# Patient Record
Sex: MALE | Race: BLACK OR AFRICAN AMERICAN | NOT HISPANIC OR LATINO | URBAN - METROPOLITAN AREA
[De-identification: names, ages, dates, MRNs, and addresses within clinical notes are randomized per-mention and may not be internally consistent; named-entity substitution may affect disease eponyms.]

---

## 2017-02-04 ENCOUNTER — EMERGENCY (EMERGENCY)
Facility: HOSPITAL | Age: 62
LOS: 1 days | Discharge: HOME | End: 2017-02-04
Attending: EMERGENCY MEDICINE | Admitting: EMERGENCY MEDICINE
Payer: COMMERCIAL

## 2017-02-04 VITALS
SYSTOLIC BLOOD PRESSURE: 156 MMHG | RESPIRATION RATE: 18 BRPM | HEART RATE: 83 BPM | OXYGEN SATURATION: 99 % | TEMPERATURE: 98 F | DIASTOLIC BLOOD PRESSURE: 99 MMHG

## 2017-02-04 DIAGNOSIS — Z79.899 OTHER LONG TERM (CURRENT) DRUG THERAPY: ICD-10-CM

## 2017-02-04 DIAGNOSIS — M25.512 PAIN IN LEFT SHOULDER: ICD-10-CM

## 2017-02-04 DIAGNOSIS — G89.29 OTHER CHRONIC PAIN: ICD-10-CM

## 2017-02-04 DIAGNOSIS — Z98.89 OTHER SPECIFIED POSTPROCEDURAL STATES: Chronic | ICD-10-CM

## 2017-02-04 DIAGNOSIS — I10 ESSENTIAL (PRIMARY) HYPERTENSION: ICD-10-CM

## 2017-02-04 LAB
ALBUMIN SERPL ELPH-MCNC: 4.4 G/DL — SIGNIFICANT CHANGE UP (ref 3.3–5)
ALP SERPL-CCNC: 54 U/L — SIGNIFICANT CHANGE UP (ref 40–120)
ALT FLD-CCNC: 24 U/L RC — SIGNIFICANT CHANGE UP (ref 10–45)
ANION GAP SERPL CALC-SCNC: 12 MMOL/L — SIGNIFICANT CHANGE UP (ref 5–17)
APPEARANCE UR: CLEAR — SIGNIFICANT CHANGE UP
AST SERPL-CCNC: 21 U/L — SIGNIFICANT CHANGE UP (ref 10–40)
BASOPHILS # BLD AUTO: 0.1 K/UL — SIGNIFICANT CHANGE UP (ref 0–0.2)
BASOPHILS NFR BLD AUTO: 1.1 % — SIGNIFICANT CHANGE UP (ref 0–2)
BILIRUB SERPL-MCNC: 0.6 MG/DL — SIGNIFICANT CHANGE UP (ref 0.2–1.2)
BILIRUB UR-MCNC: NEGATIVE — SIGNIFICANT CHANGE UP
BUN SERPL-MCNC: 21 MG/DL — SIGNIFICANT CHANGE UP (ref 7–23)
CALCIUM SERPL-MCNC: 9.1 MG/DL — SIGNIFICANT CHANGE UP (ref 8.4–10.5)
CHLORIDE SERPL-SCNC: 100 MMOL/L — SIGNIFICANT CHANGE UP (ref 96–108)
CO2 SERPL-SCNC: 27 MMOL/L — SIGNIFICANT CHANGE UP (ref 22–31)
COLOR SPEC: SIGNIFICANT CHANGE UP
CREAT SERPL-MCNC: 1.32 MG/DL — HIGH (ref 0.5–1.3)
DIFF PNL FLD: ABNORMAL
EOSINOPHIL # BLD AUTO: 0.2 K/UL — SIGNIFICANT CHANGE UP (ref 0–0.5)
EOSINOPHIL NFR BLD AUTO: 4 % — SIGNIFICANT CHANGE UP (ref 0–6)
EPI CELLS # UR: SIGNIFICANT CHANGE UP /HPF
GLUCOSE SERPL-MCNC: 94 MG/DL — SIGNIFICANT CHANGE UP (ref 70–99)
GLUCOSE UR QL: NEGATIVE — SIGNIFICANT CHANGE UP
HCT VFR BLD CALC: 45.1 % — SIGNIFICANT CHANGE UP (ref 39–50)
HGB BLD-MCNC: 15.1 G/DL — SIGNIFICANT CHANGE UP (ref 13–17)
KETONES UR-MCNC: NEGATIVE — SIGNIFICANT CHANGE UP
LEUKOCYTE ESTERASE UR-ACNC: NEGATIVE — SIGNIFICANT CHANGE UP
LYMPHOCYTES # BLD AUTO: 1.8 K/UL — SIGNIFICANT CHANGE UP (ref 1–3.3)
LYMPHOCYTES # BLD AUTO: 29.6 % — SIGNIFICANT CHANGE UP (ref 13–44)
MCHC RBC-ENTMCNC: 30.2 PG — SIGNIFICANT CHANGE UP (ref 27–34)
MCHC RBC-ENTMCNC: 33.5 GM/DL — SIGNIFICANT CHANGE UP (ref 32–36)
MCV RBC AUTO: 90.1 FL — SIGNIFICANT CHANGE UP (ref 80–100)
MONOCYTES # BLD AUTO: 0.5 K/UL — SIGNIFICANT CHANGE UP (ref 0–0.9)
MONOCYTES NFR BLD AUTO: 8 % — SIGNIFICANT CHANGE UP (ref 2–14)
NEUTROPHILS # BLD AUTO: 3.5 K/UL — SIGNIFICANT CHANGE UP (ref 1.8–7.4)
NEUTROPHILS NFR BLD AUTO: 57.3 % — SIGNIFICANT CHANGE UP (ref 43–77)
NITRITE UR-MCNC: NEGATIVE — SIGNIFICANT CHANGE UP
PH UR: 6.5 — SIGNIFICANT CHANGE UP (ref 4.8–8)
PLATELET # BLD AUTO: 170 K/UL — SIGNIFICANT CHANGE UP (ref 150–400)
POTASSIUM SERPL-MCNC: 4.2 MMOL/L — SIGNIFICANT CHANGE UP (ref 3.5–5.3)
POTASSIUM SERPL-SCNC: 4.2 MMOL/L — SIGNIFICANT CHANGE UP (ref 3.5–5.3)
PROT SERPL-MCNC: 7.6 G/DL — SIGNIFICANT CHANGE UP (ref 6–8.3)
PROT UR-MCNC: NEGATIVE — SIGNIFICANT CHANGE UP
RBC # BLD: 5 M/UL — SIGNIFICANT CHANGE UP (ref 4.2–5.8)
RBC # FLD: 12.3 % — SIGNIFICANT CHANGE UP (ref 10.3–14.5)
RBC CASTS # UR COMP ASSIST: SIGNIFICANT CHANGE UP /HPF (ref 0–2)
SODIUM SERPL-SCNC: 139 MMOL/L — SIGNIFICANT CHANGE UP (ref 135–145)
SP GR SPEC: 1.02 — SIGNIFICANT CHANGE UP (ref 1.01–1.02)
UROBILINOGEN FLD QL: NEGATIVE — SIGNIFICANT CHANGE UP
WBC # BLD: 6.2 K/UL — SIGNIFICANT CHANGE UP (ref 3.8–10.5)
WBC # FLD AUTO: 6.2 K/UL — SIGNIFICANT CHANGE UP (ref 3.8–10.5)
WBC UR QL: SIGNIFICANT CHANGE UP /HPF (ref 0–5)

## 2017-02-04 PROCEDURE — 96374 THER/PROPH/DIAG INJ IV PUSH: CPT

## 2017-02-04 PROCEDURE — 99284 EMERGENCY DEPT VISIT MOD MDM: CPT | Mod: 25

## 2017-02-04 PROCEDURE — 73030 X-RAY EXAM OF SHOULDER: CPT | Mod: 26,LT

## 2017-02-04 PROCEDURE — 71046 X-RAY EXAM CHEST 2 VIEWS: CPT

## 2017-02-04 PROCEDURE — 71020: CPT | Mod: 26

## 2017-02-04 PROCEDURE — 73030 X-RAY EXAM OF SHOULDER: CPT

## 2017-02-04 PROCEDURE — 80053 COMPREHEN METABOLIC PANEL: CPT

## 2017-02-04 PROCEDURE — 99284 EMERGENCY DEPT VISIT MOD MDM: CPT

## 2017-02-04 PROCEDURE — 85027 COMPLETE CBC AUTOMATED: CPT

## 2017-02-04 PROCEDURE — 81001 URINALYSIS AUTO W/SCOPE: CPT

## 2017-02-04 RX ORDER — MORPHINE SULFATE 50 MG/1
4 CAPSULE, EXTENDED RELEASE ORAL ONCE
Qty: 0 | Refills: 0 | Status: DISCONTINUED | OUTPATIENT
Start: 2017-02-04 | End: 2017-02-04

## 2017-02-04 RX ORDER — IBUPROFEN 200 MG
600 TABLET ORAL ONCE
Qty: 0 | Refills: 0 | Status: DISCONTINUED | OUTPATIENT
Start: 2017-02-04 | End: 2017-02-04

## 2017-02-04 RX ORDER — OXYCODONE HYDROCHLORIDE 5 MG/1
5 TABLET ORAL ONCE
Qty: 0 | Refills: 0 | Status: DISCONTINUED | OUTPATIENT
Start: 2017-02-04 | End: 2017-02-04

## 2017-02-04 RX ORDER — OXYCODONE HYDROCHLORIDE 5 MG/1
1 TABLET ORAL
Qty: 8 | Refills: 0
Start: 2017-02-04 | End: 2017-02-06

## 2017-02-04 RX ADMIN — MORPHINE SULFATE 4 MILLIGRAM(S): 50 CAPSULE, EXTENDED RELEASE ORAL at 12:58

## 2017-02-04 NOTE — ED PROVIDER NOTE - PROGRESS NOTE DETAILS
pain improved with morphine, labs wnl except for slight elevation of Cr, Xray normal lungs/no bony lesions. will check urine for protein. Have patient Follow up with primary doctor/nephrologist for further w/u and monitoring of renal function -Talya DO pain improved, UA no protein, will d/c with outpatient Follow up -Talya LARIOS

## 2017-02-04 NOTE — ED PROVIDER NOTE - CARE PLAN
Principal Discharge DX:	Chronic left shoulder pain  Instructions for follow-up, activity and diet:	1. Return to ED for worsening, progressive or any other concerning symptoms   2. Follow up with your primary care doctor in 2-3days, also follow up with your nephrologist in 1-2 weeks  3. Take Tylenol up to 650 mg every 6 hours as needed for pain.   4. Take oxycodone 5 mg every 6 hours as needed for severe pain; do not drink alcohol while taking this medication.

## 2017-02-04 NOTE — ED PROVIDER NOTE - PHYSICAL EXAMINATION
Gen: moderate discomfort, AOx3  Head: NCAT  HEENT: PERRL, oral mucosa moist, normal conjunctiva, neck supple  Lung: CTAB, no respiratory distress  CV: rrr, no murmur, Normal perfusion, +2 b/l radial and brachial pulses, cap refill <2, extremities warm  Abd: soft, ND  MSK: No edema, no visible deformities, no reproducible pain, points to pain suprascapular and subscapular, unable to reproduce pain with shoulder/neck movements, no midline vertebral ttp, no scapular/clavicular ttp  Neuro: No focal neurologic deficits, sensation intact, 5/5 global strength  Skin: No rash   Psych: normal affect

## 2017-02-04 NOTE — ED PROVIDER NOTE - ATTENDING CONTRIBUTION TO CARE
Private Physician Anastacia ([facility])  61y male pmh HTN, No dm, hld, cancer, sp benign cyst on kidney sp nephrectomy 2y ago. no habits, No cad/mi/travel. Employed as , Pt comes to ed complains of left shoulder pain past 3wk, Constant, "it just hurt with a sticking pain"  No precipitating alleviating facotors took oxycodone X1 without relief. No cp/sob/fc/abd/weight loss/cough/hemptysis/ha/dizzy/weakness/numbness. PE WDWN male awake alert with mild distress from left shoulder pain. NCAT Neck supple neg by nexus, Left shoulder full rom no ttp/swelling/erythema/edmea. Distal neuro vasc normal Chest clear anterior & posterior. cv no rubs, gallops or murmurs abd soft neuro no focal defects  Theron Chang MD, Facep

## 2017-02-04 NOTE — ED PROVIDER NOTE - OBJECTIVE STATEMENT
60yo M with left shoulder pain x3 weeks atraumatic, worsening, taking intermittent NSAID and oxy w/o any relief, constant 10/10 radiates from left upper scapula into shoulder intermittently, not worse with movement of shoulder/neck pain. no fever/chills. no weakness. does not radiate down arm. no pain elsewhere. no h/o CA. h/o nephrectomy for cyst, no urinary sx, no LE weakness. no paresthesias. no neuro complaints. no CP/SOB. not worse with exertion.

## 2017-02-04 NOTE — ED ADULT NURSE NOTE - OBJECTIVE STATEMENT
Pt presents to the ER A+Ox3 complaining of left shoulder pain x3 weeks. Pt describes the pain as severe and a stabbing pain; cannot describe what relieves the pain; hurts constantly with or without movement. Pt is a  for a living. Pt is able to move all four extremities; no limitations with ROM in effected shoulder. (-) redness, swelling, deformity. Sensory intact.

## 2017-02-04 NOTE — ED PROVIDER NOTE - MEDICAL DECISION MAKING DETAILS
atraumatic pain, unable to reproduce, no suspicion for cardiac/pulm etiology, concern for potential malignancy/metastatic process/MM/lymphoma. will check CBC/CMP/UA. Xray shoulder/chest. if normal plan to d/c with close Follow up for further w/u and imaging as needed

## 2017-02-04 NOTE — ED PROVIDER NOTE - PLAN OF CARE
1. Return to ED for worsening, progressive or any other concerning symptoms   2. Follow up with your primary care doctor in 2-3days, also follow up with your nephrologist in 1-2 weeks  3. Take Tylenol up to 650 mg every 6 hours as needed for pain.   4. Take oxycodone 5 mg every 6 hours as needed for severe pain; do not drink alcohol while taking this medication.

## 2017-06-20 ENCOUNTER — EMERGENCY (EMERGENCY)
Facility: HOSPITAL | Age: 62
LOS: 1 days | Discharge: HOME | End: 2017-06-20
Attending: EMERGENCY MEDICINE | Admitting: EMERGENCY MEDICINE
Payer: COMMERCIAL

## 2017-06-20 VITALS
RESPIRATION RATE: 18 BRPM | OXYGEN SATURATION: 98 % | SYSTOLIC BLOOD PRESSURE: 139 MMHG | HEART RATE: 86 BPM | DIASTOLIC BLOOD PRESSURE: 77 MMHG | TEMPERATURE: 100 F

## 2017-06-20 VITALS
RESPIRATION RATE: 16 BRPM | TEMPERATURE: 99 F | SYSTOLIC BLOOD PRESSURE: 152 MMHG | OXYGEN SATURATION: 99 % | HEART RATE: 79 BPM | DIASTOLIC BLOOD PRESSURE: 84 MMHG

## 2017-06-20 DIAGNOSIS — Z98.89 OTHER SPECIFIED POSTPROCEDURAL STATES: Chronic | ICD-10-CM

## 2017-06-20 PROCEDURE — 99284 EMERGENCY DEPT VISIT MOD MDM: CPT | Mod: 25

## 2017-06-20 PROCEDURE — 72100 X-RAY EXAM L-S SPINE 2/3 VWS: CPT | Mod: 26

## 2017-06-20 PROCEDURE — 96374 THER/PROPH/DIAG INJ IV PUSH: CPT

## 2017-06-20 PROCEDURE — 99284 EMERGENCY DEPT VISIT MOD MDM: CPT

## 2017-06-20 PROCEDURE — 72100 X-RAY EXAM L-S SPINE 2/3 VWS: CPT

## 2017-06-20 PROCEDURE — 96375 TX/PRO/DX INJ NEW DRUG ADDON: CPT

## 2017-06-20 RX ORDER — KETOROLAC TROMETHAMINE 30 MG/ML
15 SYRINGE (ML) INJECTION ONCE
Qty: 0 | Refills: 0 | Status: DISCONTINUED | OUTPATIENT
Start: 2017-06-20 | End: 2017-06-20

## 2017-06-20 RX ORDER — LIDOCAINE 4 G/100G
1 CREAM TOPICAL ONCE
Qty: 0 | Refills: 0 | Status: COMPLETED | OUTPATIENT
Start: 2017-06-20 | End: 2017-06-20

## 2017-06-20 RX ORDER — SODIUM CHLORIDE 9 MG/ML
1000 INJECTION INTRAMUSCULAR; INTRAVENOUS; SUBCUTANEOUS ONCE
Qty: 0 | Refills: 0 | Status: COMPLETED | OUTPATIENT
Start: 2017-06-20 | End: 2017-06-20

## 2017-06-20 RX ORDER — ACETAMINOPHEN 500 MG
1000 TABLET ORAL ONCE
Qty: 0 | Refills: 0 | Status: COMPLETED | OUTPATIENT
Start: 2017-06-20 | End: 2017-06-20

## 2017-06-20 RX ADMIN — Medication 15 MILLIGRAM(S): at 13:43

## 2017-06-20 RX ADMIN — SODIUM CHLORIDE 1000 MILLILITER(S): 9 INJECTION INTRAMUSCULAR; INTRAVENOUS; SUBCUTANEOUS at 13:43

## 2017-06-20 RX ADMIN — Medication 400 MILLIGRAM(S): at 13:43

## 2017-06-20 RX ADMIN — LIDOCAINE 1 PATCH: 4 CREAM TOPICAL at 13:38

## 2017-06-20 NOTE — ED PROVIDER NOTE - CARE PLAN
Principal Discharge DX:	Back pain Principal Discharge DX:	Back pain  Instructions for follow-up, activity and diet:	Take Tylenol 1000 mg every 6 hours as needed for pain. Use lidocaine patches (over the counter, i.e. Salonpas). Take ibuprofen 600 mg or Aleve once a day. Follow up with your primary doctor and neurologist. Return to the Emergency Dept if you develop any new or worsening symptoms

## 2017-06-20 NOTE — ED PROVIDER NOTE - MEDICAL DECISION MAKING DETAILS
61 year old male with 5 days of back pain, will check basic labs and get lumbar x-ray due to history of CA to r/o mets, pain control, and likely d/c with spine follow up Jacob: 61 year old male with 5 days of back pain, will check basic labs and get lumbar x-ray due to history of CA to r/o mets, pain control, and likely d/c with spine follow up Jacob: 61 year old male with 5 days of back pain, will check lumbar x-ray due to history of CA to r/o mets, pain control, and likely d/c with spine follow up

## 2017-06-20 NOTE — ED PROVIDER NOTE - PLAN OF CARE
Take Tylenol 1000 mg every 6 hours as needed for pain. Use lidocaine patches (over the counter, i.e. Salonpas). Take ibuprofen 600 mg or Aleve once a day. Follow up with your primary doctor and neurologist. Return to the Emergency Dept if you develop any new or worsening symptoms

## 2017-06-20 NOTE — ED PROVIDER NOTE - PROGRESS NOTE DETAILS
Patient feeling much better after treatment, expresses desire to go home. Discussed plan of care and results with patient. Patient comfortable with discharge plan, understands return instructions.

## 2017-06-20 NOTE — ED PROVIDER NOTE - OBJECTIVE STATEMENT
61 year old male with PMHx of HTN, R renal CA years ago s/p nephrectomy (no chemo or radiation) presenting with lower back pain x 5 days, worsening with movement. Denies fevers, chills, numbness, tingling, weakness, bowel or bladder problems, or difficulty ambulating. He had this once before many years ago. Took 2 Aleve this morning.

## 2017-07-28 ENCOUNTER — APPOINTMENT (OUTPATIENT)
Dept: GASTROENTEROLOGY | Facility: CLINIC | Age: 62
End: 2017-07-28
Payer: COMMERCIAL

## 2017-07-28 PROCEDURE — 99204 OFFICE O/P NEW MOD 45 MIN: CPT

## 2017-08-04 ENCOUNTER — APPOINTMENT (OUTPATIENT)
Dept: GASTROENTEROLOGY | Facility: CLINIC | Age: 62
End: 2017-08-04
Payer: COMMERCIAL

## 2017-08-04 ENCOUNTER — RESULT REVIEW (OUTPATIENT)
Age: 62
End: 2017-08-04

## 2017-08-04 PROCEDURE — 43239 EGD BIOPSY SINGLE/MULTIPLE: CPT | Mod: 59

## 2017-08-04 PROCEDURE — 45378 DIAGNOSTIC COLONOSCOPY: CPT

## 2017-12-20 NOTE — ED ADULT NURSE NOTE - CHIEF COMPLAINT
The patient is a 61y Male complaining of pain, shoulder. Normal vision: sees adequately in most situations; can see medication labels, newsprint

## 2018-11-12 ENCOUNTER — EMERGENCY (EMERGENCY)
Facility: HOSPITAL | Age: 63
LOS: 1 days | Discharge: HOME | End: 2018-11-12
Attending: EMERGENCY MEDICINE
Payer: COMMERCIAL

## 2018-11-12 VITALS
DIASTOLIC BLOOD PRESSURE: 69 MMHG | HEART RATE: 77 BPM | OXYGEN SATURATION: 99 % | SYSTOLIC BLOOD PRESSURE: 123 MMHG | HEIGHT: 69 IN | RESPIRATION RATE: 18 BRPM | WEIGHT: 166.89 LBS | TEMPERATURE: 98 F

## 2018-11-12 VITALS
DIASTOLIC BLOOD PRESSURE: 81 MMHG | RESPIRATION RATE: 18 BRPM | HEART RATE: 71 BPM | TEMPERATURE: 98 F | SYSTOLIC BLOOD PRESSURE: 144 MMHG | OXYGEN SATURATION: 99 %

## 2018-11-12 DIAGNOSIS — Z90.5 ACQUIRED ABSENCE OF KIDNEY: Chronic | ICD-10-CM

## 2018-11-12 DIAGNOSIS — Z98.89 OTHER SPECIFIED POSTPROCEDURAL STATES: Chronic | ICD-10-CM

## 2018-11-12 LAB
ALBUMIN SERPL ELPH-MCNC: 4.8 G/DL — SIGNIFICANT CHANGE UP (ref 3.3–5)
ALP SERPL-CCNC: 54 U/L — SIGNIFICANT CHANGE UP (ref 40–120)
ALT FLD-CCNC: 21 U/L — SIGNIFICANT CHANGE UP (ref 10–45)
ANION GAP SERPL CALC-SCNC: 12 MMOL/L — SIGNIFICANT CHANGE UP (ref 5–17)
APTT BLD: 30.2 SEC — SIGNIFICANT CHANGE UP (ref 27.5–36.3)
AST SERPL-CCNC: 18 U/L — SIGNIFICANT CHANGE UP (ref 10–40)
BASOPHILS # BLD AUTO: 0 K/UL — SIGNIFICANT CHANGE UP (ref 0–0.2)
BASOPHILS NFR BLD AUTO: 0.6 % — SIGNIFICANT CHANGE UP (ref 0–2)
BILIRUB SERPL-MCNC: 0.6 MG/DL — SIGNIFICANT CHANGE UP (ref 0.2–1.2)
BUN SERPL-MCNC: 21 MG/DL — SIGNIFICANT CHANGE UP (ref 7–23)
CALCIUM SERPL-MCNC: 9.3 MG/DL — SIGNIFICANT CHANGE UP (ref 8.4–10.5)
CHLORIDE SERPL-SCNC: 101 MMOL/L — SIGNIFICANT CHANGE UP (ref 96–108)
CO2 SERPL-SCNC: 28 MMOL/L — SIGNIFICANT CHANGE UP (ref 22–31)
CREAT SERPL-MCNC: 1.52 MG/DL — HIGH (ref 0.5–1.3)
EOSINOPHIL # BLD AUTO: 0.2 K/UL — SIGNIFICANT CHANGE UP (ref 0–0.5)
EOSINOPHIL NFR BLD AUTO: 4.3 % — SIGNIFICANT CHANGE UP (ref 0–6)
GLUCOSE SERPL-MCNC: 83 MG/DL — SIGNIFICANT CHANGE UP (ref 70–99)
HCT VFR BLD CALC: 45.7 % — SIGNIFICANT CHANGE UP (ref 39–50)
HGB BLD-MCNC: 15.5 G/DL — SIGNIFICANT CHANGE UP (ref 13–17)
INR BLD: 0.97 RATIO — SIGNIFICANT CHANGE UP (ref 0.88–1.16)
LYMPHOCYTES # BLD AUTO: 1.6 K/UL — SIGNIFICANT CHANGE UP (ref 1–3.3)
LYMPHOCYTES # BLD AUTO: 29.5 % — SIGNIFICANT CHANGE UP (ref 13–44)
MCHC RBC-ENTMCNC: 29.8 PG — SIGNIFICANT CHANGE UP (ref 27–34)
MCHC RBC-ENTMCNC: 33.8 GM/DL — SIGNIFICANT CHANGE UP (ref 32–36)
MCV RBC AUTO: 88 FL — SIGNIFICANT CHANGE UP (ref 80–100)
MONOCYTES # BLD AUTO: 0.5 K/UL — SIGNIFICANT CHANGE UP (ref 0–0.9)
MONOCYTES NFR BLD AUTO: 8.3 % — SIGNIFICANT CHANGE UP (ref 2–14)
NEUTROPHILS # BLD AUTO: 3.2 K/UL — SIGNIFICANT CHANGE UP (ref 1.8–7.4)
NEUTROPHILS NFR BLD AUTO: 57.3 % — SIGNIFICANT CHANGE UP (ref 43–77)
PLATELET # BLD AUTO: 190 K/UL — SIGNIFICANT CHANGE UP (ref 150–400)
POTASSIUM SERPL-MCNC: 4 MMOL/L — SIGNIFICANT CHANGE UP (ref 3.5–5.3)
POTASSIUM SERPL-SCNC: 4 MMOL/L — SIGNIFICANT CHANGE UP (ref 3.5–5.3)
PROT SERPL-MCNC: 8.3 G/DL — SIGNIFICANT CHANGE UP (ref 6–8.3)
PROTHROM AB SERPL-ACNC: 11.2 SEC — SIGNIFICANT CHANGE UP (ref 10–12.9)
RBC # BLD: 5.2 M/UL — SIGNIFICANT CHANGE UP (ref 4.2–5.8)
RBC # FLD: 12.2 % — SIGNIFICANT CHANGE UP (ref 10.3–14.5)
SODIUM SERPL-SCNC: 141 MMOL/L — SIGNIFICANT CHANGE UP (ref 135–145)
WBC # BLD: 5.6 K/UL — SIGNIFICANT CHANGE UP (ref 3.8–10.5)
WBC # FLD AUTO: 5.6 K/UL — SIGNIFICANT CHANGE UP (ref 3.8–10.5)

## 2018-11-12 PROCEDURE — 71260 CT THORAX DX C+: CPT | Mod: 26

## 2018-11-12 PROCEDURE — 71046 X-RAY EXAM CHEST 2 VIEWS: CPT | Mod: 26

## 2018-11-12 PROCEDURE — 71275 CT ANGIOGRAPHY CHEST: CPT

## 2018-11-12 PROCEDURE — 71260 CT THORAX DX C+: CPT

## 2018-11-12 PROCEDURE — 74177 CT ABD & PELVIS W/CONTRAST: CPT | Mod: 26

## 2018-11-12 PROCEDURE — 71046 X-RAY EXAM CHEST 2 VIEWS: CPT

## 2018-11-12 PROCEDURE — 80053 COMPREHEN METABOLIC PANEL: CPT

## 2018-11-12 PROCEDURE — 85027 COMPLETE CBC AUTOMATED: CPT

## 2018-11-12 PROCEDURE — 85610 PROTHROMBIN TIME: CPT

## 2018-11-12 PROCEDURE — 99284 EMERGENCY DEPT VISIT MOD MDM: CPT | Mod: 25

## 2018-11-12 PROCEDURE — 99284 EMERGENCY DEPT VISIT MOD MDM: CPT

## 2018-11-12 PROCEDURE — 85730 THROMBOPLASTIN TIME PARTIAL: CPT

## 2018-11-12 PROCEDURE — 74177 CT ABD & PELVIS W/CONTRAST: CPT

## 2018-11-12 RX ORDER — LIDOCAINE 4 G/100G
1 CREAM TOPICAL ONCE
Qty: 0 | Refills: 0 | Status: COMPLETED | OUTPATIENT
Start: 2018-11-12 | End: 2018-11-12

## 2018-11-12 RX ADMIN — LIDOCAINE 1 PATCH: 4 CREAM TOPICAL at 15:56

## 2018-11-12 NOTE — ED ADULT NURSE REASSESSMENT NOTE - NS ED NURSE REASSESS COMMENT FT1
right sided back/abdominal pain since Friday, worsening. denies nausea/vomiting.   no interventions needed at this time.

## 2018-11-12 NOTE — ED PROVIDER NOTE - OBJECTIVE STATEMENT
62 y/o M pt with PSHx of right sided nephrectomy (3 years ago) c/o intermittent right sided abd pain x3-4 days now radiating to the left side today. Notes pain is worse with movement and breathing deeply. Didn't take any pain medication. Had the nephrectomy because there was a cyst on the kidney. Pt is urinating well. Denies fever, chills, nausea, vomiting or any other complaints.

## 2018-11-12 NOTE — ED ADULT NURSE REASSESSMENT NOTE - NS ED NURSE REASSESS COMMENT FT1
Received report this PM from Blanca Stanton RN. PT observed resting comfortably in bed. Pt denies any needs at this time. Awaiting CT results. Pt denies any pain at this time.  Plan of care reviewed with pt. Bed in lowest position, wheels locked, and patient placed in position of comfort.

## 2018-11-12 NOTE — ED ADULT NURSE NOTE - OBJECTIVE STATEMENT
64 y/o M, A&Ox4, enters ED w/ c/o R. sided rib pain. Hx. of R. nephrectomy (due to cancer) 3 yrs. ago as per pt. Pt. reports "sharp/poking" pain in R. rib area. Pt. reports it's worse w/ movement and inspiration. Pt. reports it radiates to the left side. No abdominal pain. Abdomen soft, round, nontender. No n/v/diarrhea. No dysuria/hematuria. No fever/chills. No chest pain/SOB. No leg swelling/calf pain. No recent travel. Pt. did not take anything for pain. Skin warm, dry and intact. Safety and comfort provided. Call bell within reach.

## 2018-11-12 NOTE — ED STATDOCS - OBJECTIVE STATEMENT
64 y/o M pt with PSHx of right sided nephrectomy (3 years ago) c/o intermittent right sided abd pain x3-4 days now radiating to the left side today. Notes pain is worse with movement and breathing deeply. Had the nephrectomy because there was a cyst on the kidney. Denies fever, chills, nausea, vomiting or any other complaints. Current medications: Norvasc 62 y/o M pt with PSHx of right sided nephrectomy (3 years ago) c/o intermittent right sided abd pain x3-4 days now radiating to the left side today. Notes pain is worse with movement and breathing deeply. Didn't take any pain medication. Had the nephrectomy because there was a cyst on the kidney. Pt is urinating well. Denies fever, chills, nausea, vomiting or any other complaints. Current medications: Norvasc 64 y/o M pt with PSHx of right sided nephrectomy (3 years ago) c/o intermittent right sided abd pain x3-4 days now radiating to the left side today. Notes pain is worse with movement and breathing deeply. Didn't take any pain medication. Had the nephrectomy because there was a cyst on the kidney. Pt is urinating well. Denies fever, chills, nausea, vomiting or any other complaints. Current medications: Norvasc  A/P -- Atraumatic RUQ/R chest wall pain.  Reproducible, worse c range of motion, no SOB or constitutional symptoms.  CXR, further w/u as per primary team

## 2018-11-12 NOTE — ED PROVIDER NOTE - MEDICAL DECISION MAKING DETAILS
63m pmh nephrectomy R side. patient with episodic right flank pain, likely msk, analgesia, reassess 63m pmh nephrectomy R side for papillary carcinoma, patient with episodic right flank pain, will do labs, ct, unlikely PE, risk factors for cancer, will decide for CTA 63m pmh nephrectomy R side for papillary carcinoma, patient with episodic right flank pain, will do labs, ct, unlikely PE, risk factors for cancer, will decide for CTA, likely MSK in origin

## 2018-11-12 NOTE — ED PROVIDER NOTE - ATTENDING CONTRIBUTION TO CARE
Dr. Lazcano (Attending Physician)  Pt. with ho right sided nephrectomy for renal cysts pw episodes of shooting right flank pain radiating across his abdomen.  +right cvat. -hoyos's.  abdomen soft nontender.  Lungs clear.  Will check labs, ua, ct abdomen and pelvis, pain control and reassess.

## 2018-11-12 NOTE — ED PROVIDER NOTE - NS ED ROS FT
ROS:  GENERAL: No fever, no chills  EYES: no change in vision  HEENT: no trouble swallowing, no trouble speaking  CARDIAC: no chest pain  : R flank pain, no nausea, no vomiting, no diarrhea, no constipation  : No dysuria, no frequency, no change in appearance, or odor of urine  SKIN: no rashes  NEURO: no headache, no weakness  MSK: No joint pain  ~Sadiq Haas D.O. -Resident

## 2018-11-12 NOTE — ED PROVIDER NOTE - PROGRESS NOTE DETAILS
discussed risks and benefits of ct scan with contrast, patient wants to continue with plan and proceed with ct scan despite risks of nephrotox

## 2018-11-12 NOTE — ED ADULT NURSE NOTE - NSIMPLEMENTINTERV_GEN_ALL_ED
Implemented All Universal Safety Interventions:  Shanks to call system. Call bell, personal items and telephone within reach. Instruct patient to call for assistance. Room bathroom lighting operational. Non-slip footwear when patient is off stretcher. Physically safe environment: no spills, clutter or unnecessary equipment. Stretcher in lowest position, wheels locked, appropriate side rails in place.

## 2018-11-12 NOTE — ED PROVIDER NOTE - PHYSICAL EXAMINATION
Physical Exam:  Gen: NAD, AOx3, non-toxic appearing  Head: NCAT  HEENT: EOMI,  normal conjunctiva, oral mucosa moist  Lung: CTAB, no respiratory distress, no wheezes/rhonchi/rales B/L, speaking in full sentences  CV: RRR, no murmurs, rubs or gallops  Abd: soft, NTND, no guarding, no CVA tenderness   MSK: no visible deformities, ROM normal in UE/LE +R sided flank pain  Neuro: No focal sensory or motor deficits  Skin: Warm, well perfused, no rash  Psych: normal affect, calm  ~Sadiq Haas D.O. -Resident

## 2022-05-10 ENCOUNTER — EMERGENCY (EMERGENCY)
Facility: HOSPITAL | Age: 67
LOS: 1 days | Discharge: HOME | End: 2022-05-10
Attending: EMERGENCY MEDICINE
Payer: COMMERCIAL

## 2022-05-10 VITALS
OXYGEN SATURATION: 96 % | WEIGHT: 162.04 LBS | HEIGHT: 68 IN | TEMPERATURE: 98 F | RESPIRATION RATE: 15 BRPM | HEART RATE: 89 BPM | DIASTOLIC BLOOD PRESSURE: 82 MMHG | SYSTOLIC BLOOD PRESSURE: 164 MMHG

## 2022-05-10 DIAGNOSIS — Z90.5 ACQUIRED ABSENCE OF KIDNEY: Chronic | ICD-10-CM

## 2022-05-10 DIAGNOSIS — Z98.89 OTHER SPECIFIED POSTPROCEDURAL STATES: Chronic | ICD-10-CM

## 2022-05-10 LAB
ALBUMIN SERPL ELPH-MCNC: 3.9 G/DL — SIGNIFICANT CHANGE UP (ref 3.3–5)
ALP SERPL-CCNC: 66 U/L — SIGNIFICANT CHANGE UP (ref 40–120)
ALT FLD-CCNC: 17 U/L — SIGNIFICANT CHANGE UP (ref 10–45)
ANION GAP SERPL CALC-SCNC: 11 MMOL/L — SIGNIFICANT CHANGE UP (ref 5–17)
AST SERPL-CCNC: 15 U/L — SIGNIFICANT CHANGE UP (ref 10–40)
BILIRUB SERPL-MCNC: 0.2 MG/DL — SIGNIFICANT CHANGE UP (ref 0.2–1.2)
BUN SERPL-MCNC: 19 MG/DL — SIGNIFICANT CHANGE UP (ref 7–23)
CALCIUM SERPL-MCNC: 8.6 MG/DL — SIGNIFICANT CHANGE UP (ref 8.4–10.5)
CHLORIDE SERPL-SCNC: 104 MMOL/L — SIGNIFICANT CHANGE UP (ref 96–108)
CO2 SERPL-SCNC: 28 MMOL/L — SIGNIFICANT CHANGE UP (ref 22–31)
CREAT SERPL-MCNC: 1.62 MG/DL — HIGH (ref 0.5–1.3)
EGFR: 46 ML/MIN/1.73M2 — LOW
GLUCOSE SERPL-MCNC: 200 MG/DL — HIGH (ref 70–99)
HCOV PNL SPEC NAA+PROBE: DETECTED
HCT VFR BLD CALC: 36.6 % — LOW (ref 39–50)
HGB BLD-MCNC: 11.6 G/DL — LOW (ref 13–17)
MCHC RBC-ENTMCNC: 27.8 PG — SIGNIFICANT CHANGE UP (ref 27–34)
MCHC RBC-ENTMCNC: 31.7 GM/DL — LOW (ref 32–36)
MCV RBC AUTO: 87.6 FL — SIGNIFICANT CHANGE UP (ref 80–100)
NRBC # BLD: 0 /100 WBCS — SIGNIFICANT CHANGE UP (ref 0–0)
PLATELET # BLD AUTO: 137 K/UL — LOW (ref 150–400)
POTASSIUM SERPL-MCNC: 4 MMOL/L — SIGNIFICANT CHANGE UP (ref 3.5–5.3)
POTASSIUM SERPL-SCNC: 4 MMOL/L — SIGNIFICANT CHANGE UP (ref 3.5–5.3)
PROT SERPL-MCNC: 7.4 G/DL — SIGNIFICANT CHANGE UP (ref 6–8.3)
RAPID RVP RESULT: DETECTED
RBC # BLD: 4.18 M/UL — LOW (ref 4.2–5.8)
RBC # FLD: 14.7 % — HIGH (ref 10.3–14.5)
SARS-COV-2 RNA SPEC QL NAA+PROBE: SIGNIFICANT CHANGE UP
SODIUM SERPL-SCNC: 143 MMOL/L — SIGNIFICANT CHANGE UP (ref 135–145)
WBC # BLD: 4.89 K/UL — SIGNIFICANT CHANGE UP (ref 3.8–10.5)
WBC # FLD AUTO: 4.89 K/UL — SIGNIFICANT CHANGE UP (ref 3.8–10.5)

## 2022-05-10 PROCEDURE — 99284 EMERGENCY DEPT VISIT MOD MDM: CPT

## 2022-05-10 PROCEDURE — 71045 X-RAY EXAM CHEST 1 VIEW: CPT | Mod: 26

## 2022-05-10 RX ORDER — ALBUTEROL 90 UG/1
2.5 AEROSOL, METERED ORAL ONCE
Refills: 0 | Status: COMPLETED | OUTPATIENT
Start: 2022-05-10 | End: 2022-05-10

## 2022-05-10 RX ORDER — SODIUM CHLORIDE 9 MG/ML
1000 INJECTION INTRAMUSCULAR; INTRAVENOUS; SUBCUTANEOUS ONCE
Refills: 0 | Status: COMPLETED | OUTPATIENT
Start: 2022-05-10 | End: 2022-05-10

## 2022-05-10 RX ORDER — ALBUTEROL 90 UG/1
2.5 AEROSOL, METERED ORAL ONCE
Refills: 0 | Status: DISCONTINUED | OUTPATIENT
Start: 2022-05-10 | End: 2022-05-10

## 2022-05-10 RX ADMIN — ALBUTEROL 2.5 MILLIGRAM(S): 90 AEROSOL, METERED ORAL at 21:57

## 2022-05-10 NOTE — ED PROVIDER NOTE - WR ORDER DATE AND TIME 1
E-prescribing failed . called pharmacy to verify .   Please send new prescription to pharmacy 10-May-2022 21:32

## 2022-05-10 NOTE — ED PROVIDER NOTE - CLINICAL SUMMARY MEDICAL DECISION MAKING FREE TEXT BOX
66y M pmhx HTN BPH nephrectomy presenting with cough ddx allergic rhinitis v cough variant asthma exacerbated by change in season. will r/o pertussis, bacterial pna, COVID and atypical pna. 66y M pmhx HTN BPH sp nephrectomy presenting with cough which he usually get in spring,  ddx allergic rhinitis v cough variant asthma exacerbated by change in season. will r/o pertussis, bacterial pna, COVID and atypical pna breathing treatment reassess .ZR

## 2022-05-10 NOTE — ED ADULT NURSE NOTE - OBJECTIVE STATEMENT
66y male, ANOX4, Able to ambulate on his on, Presents to the ed through triage for cough. Pt reports having the cough for 4 days and it is not productive. Pt states I've been using Robitussin and it hasn't helped. Pt denies headache, vision changes, throat pain, chest pain, abd pain, N/V/D, urinary discomfort, blood in urine, dark stools. Pt Covid swab has been collected and sent to lab. Pt is in 66y male, ANOX4, Able to ambulate on his on, Presents to the ed through triage for cough. Pt reports having the cough for 4 days and it is not productive. Pt states I've been using Robitussin and it hasn't helped. Pt denies headache, vision changes, throat pain, chest pain, abd pain, N/V/D, urinary discomfort, blood in urine, dark stools. Pt Covid swab has been collected and sent to lab. Bed is locked and at lowest position. Pt awaiting results and pending dispo. 66y male, ANOX4, Able to ambulate on his on, Presents to the ed through triage for cough. Pt reports having the cough for 4 days and it is not productive. Pt states I've been using Robitussin and it hasn't helped. Pt denies fever, chills, headache, vision changes, throat pain, chest pain, SOB, abd pain, N/V/D, urinary discomfort, blood in urine, dark stools. Pt is able to move all extremities Pt appears to breath normal comfortably, unlabored. Pt Covid swab has been collected and sent to lab. Bed is locked and at lowest position. Pt awaiting results and pending dispo.

## 2022-05-10 NOTE — ED PROVIDER NOTE - PATIENT PORTAL LINK FT
You can access the FollowMyHealth Patient Portal offered by [facility] by registering at the following website: [email or web address]. By joining [email or web address]’s FollowMyHealth portal, you will also be able to view your health information using other applications (apps) compatible with our system.

## 2022-05-10 NOTE — ED PROVIDER NOTE - NS ED ROS FT
REVIEW OF SYSTEMS:    CONSTITUTIONAL: No weakness, fevers or chills  EYES: No visual changes  ENT: No vertigo or throat pain   NECK: No pain or stiffness  RESPIRATORY: +nonproductive cough without SOB  CARDIOVASCULAR: No chest pain or palpitations  GASTROINTESTINAL: No abdominal or epigastric pain. No nausea, vomiting, or hematemesis; No diarrhea or constipation. No melena or hematochezia..  All other review of systems is negative unless indicated above.

## 2022-05-10 NOTE — ED PROVIDER NOTE - OBJECTIVE STATEMENT
66y M HTN BPH presenting with 4d hx cough. States cough occurs every year around spring and he goes to his outpt doctor who gives him a medicine and it goes away. Tried Robitussin at home did not go away. no fever chills sweats. no rhinorrhea, no sinus pressure/pain, no chest pain cough is not productive of sputum, no abdominal pain. Came to ED because appointment with PMD was 1 week away.

## 2022-05-10 NOTE — ED PROVIDER NOTE - IV ALTEPLASE DOOR HIDDEN
show You can access the FollowMyHealth Patient Portal offered by [facility] by registering at the following website: [email or web address]. By joining [employer]’s FollowMyHealth portal, you will also be able to view your health information using other applications (apps) compatible with our system.

## 2022-05-10 NOTE — ED ADULT NURSE NOTE - NSHOSCREENINGQ1_ED_ALL_ED
Telephone Encounter by Argelia Roque at 04/04/17 03:40 PM     Author:  Argelia Roque Service:  (none) Author Type:  Patient      Filed:  04/04/17 03:43 PM Encounter Date:  3/21/2017 Status:  Signed     :  Argelia Roque (Patient )            Pt returning call and states he will schedule surgery for 4/13 at 9am. Please advise.[AR1.1M]       Revision History        User Key Date/Time User Provider Type Action    > AR1.1 04/04/17 03:43 PM Argelia Roque Patient  Sign    M - Manual             No

## 2022-05-10 NOTE — ED PROVIDER NOTE - PROGRESS NOTE DETAILS
Jorge PGY2- Labs and swab reviewed patient is coronavirus positive but COVID negative.  Pending discussion with attending for final dispo plan. Jorge PGY2- To send home with anti-tussive. Counseled on risks and benefits of anti-tussive therapy. Patient still requesting this for symptomatic management. May return to ED or to PMD if concerns arise following DC.

## 2022-05-10 NOTE — ED PROVIDER NOTE - PHYSICAL EXAMINATION
GENERAL: patient appears well, no acute distress, appropriate, pleasant  EYES: sclera clear, no exudates  ENMT: oropharynx clear without erythema, no exudates, moist mucous membranes  NECK: supple, soft, no thyromegaly noted  LUNGS: good air entry bilaterally, clear to auscultation, symmetric breath sounds, no wheezing or rhonchi appreciated  HEART: soft S1/S2, regular rate and rhythm, no murmurs noted, no lower extremity edema  GASTROINTESTINAL: abdomen is soft, nontender, nondistended, normoactive bowel sounds, no palpable masses  INTEGUMENT: good skin turgor, no lesions noted  NEUROLOGIC: awake, alert, oriented x3, good muscle tone in 4 extremities, no obvious sensory deficits  PSYCHIATRIC: mood is good, affect is congruent, linear and logical thought process  HEME/LYMPH: no palpable supraclavicular nodules, no obvious ecchymosis or petechiae

## 2022-05-11 VITALS
TEMPERATURE: 99 F | SYSTOLIC BLOOD PRESSURE: 134 MMHG | HEART RATE: 87 BPM | OXYGEN SATURATION: 99 % | DIASTOLIC BLOOD PRESSURE: 93 MMHG | RESPIRATION RATE: 18 BRPM

## 2022-05-11 LAB
MANUAL SMEAR VERIFICATION: SIGNIFICANT CHANGE UP
PLAT MORPH BLD: NORMAL — SIGNIFICANT CHANGE UP
RBC BLD AUTO: SIGNIFICANT CHANGE UP

## 2022-05-11 PROCEDURE — 96360 HYDRATION IV INFUSION INIT: CPT

## 2022-05-11 PROCEDURE — 71045 X-RAY EXAM CHEST 1 VIEW: CPT

## 2022-05-11 PROCEDURE — 99284 EMERGENCY DEPT VISIT MOD MDM: CPT | Mod: 25

## 2022-05-11 PROCEDURE — 80053 COMPREHEN METABOLIC PANEL: CPT

## 2022-05-11 PROCEDURE — 85027 COMPLETE CBC AUTOMATED: CPT

## 2022-05-11 PROCEDURE — 0225U NFCT DS DNA&RNA 21 SARSCOV2: CPT

## 2022-05-11 PROCEDURE — 94640 AIRWAY INHALATION TREATMENT: CPT

## 2022-05-11 RX ADMIN — SODIUM CHLORIDE 1000 MILLILITER(S): 9 INJECTION INTRAMUSCULAR; INTRAVENOUS; SUBCUTANEOUS at 00:03

## 2022-05-11 RX ADMIN — SODIUM CHLORIDE 1000 MILLILITER(S): 9 INJECTION INTRAMUSCULAR; INTRAVENOUS; SUBCUTANEOUS at 00:51

## 2022-05-11 NOTE — ED ADULT NURSE REASSESSMENT NOTE - NS ED NURSE REASSESS COMMENT FT1
Pt is resting comfortably. VSS. PT well appearing. Bed is locked and at lowest position. Pt pending dispo.

## 2022-06-02 NOTE — ED ADULT NURSE NOTE - ALCOHOL PRE SCREEN (AUDIT - C)
Refilled per protocol.      Routed staff message to [facility] pool to schedule an appointment.      
Statement Selected

## 2022-07-02 ENCOUNTER — INPATIENT (INPATIENT)
Facility: HOSPITAL | Age: 67
LOS: 5 days | Discharge: HOME | DRG: 441 | End: 2022-07-08
Attending: INTERNAL MEDICINE | Admitting: HOSPITALIST
Payer: COMMERCIAL

## 2022-07-02 VITALS
OXYGEN SATURATION: 97 % | WEIGHT: 149.91 LBS | HEART RATE: 75 BPM | RESPIRATION RATE: 18 BRPM | DIASTOLIC BLOOD PRESSURE: 71 MMHG | HEIGHT: 68 IN | TEMPERATURE: 98 F | SYSTOLIC BLOOD PRESSURE: 124 MMHG

## 2022-07-02 DIAGNOSIS — Z98.89 OTHER SPECIFIED POSTPROCEDURAL STATES: Chronic | ICD-10-CM

## 2022-07-02 DIAGNOSIS — Z90.5 ACQUIRED ABSENCE OF KIDNEY: Chronic | ICD-10-CM

## 2022-07-02 DIAGNOSIS — R17 UNSPECIFIED JAUNDICE: ICD-10-CM

## 2022-07-02 LAB
ALBUMIN SERPL ELPH-MCNC: 4.4 G/DL — SIGNIFICANT CHANGE UP (ref 3.3–5)
ALP SERPL-CCNC: 490 U/L — HIGH (ref 40–120)
ALT FLD-CCNC: 499 U/L — HIGH (ref 10–45)
ANION GAP SERPL CALC-SCNC: 11 MMOL/L — SIGNIFICANT CHANGE UP (ref 5–17)
APTT BLD: 32.2 SEC — SIGNIFICANT CHANGE UP (ref 27.5–35.5)
AST SERPL-CCNC: 326 U/L — HIGH (ref 10–40)
BASOPHILS # BLD AUTO: 0.03 K/UL — SIGNIFICANT CHANGE UP (ref 0–0.2)
BASOPHILS NFR BLD AUTO: 0.6 % — SIGNIFICANT CHANGE UP (ref 0–2)
BILIRUB SERPL-MCNC: 1.3 MG/DL — HIGH (ref 0.2–1.2)
BUN SERPL-MCNC: 25 MG/DL — HIGH (ref 7–23)
CALCIUM SERPL-MCNC: 9.2 MG/DL — SIGNIFICANT CHANGE UP (ref 8.4–10.5)
CHLORIDE SERPL-SCNC: 106 MMOL/L — SIGNIFICANT CHANGE UP (ref 96–108)
CO2 SERPL-SCNC: 26 MMOL/L — SIGNIFICANT CHANGE UP (ref 22–31)
CREAT SERPL-MCNC: 1.52 MG/DL — HIGH (ref 0.5–1.3)
EGFR: 50 ML/MIN/1.73M2 — LOW
EOSINOPHIL # BLD AUTO: 0.14 K/UL — SIGNIFICANT CHANGE UP (ref 0–0.5)
EOSINOPHIL NFR BLD AUTO: 2.8 % — SIGNIFICANT CHANGE UP (ref 0–6)
GLUCOSE SERPL-MCNC: 95 MG/DL — SIGNIFICANT CHANGE UP (ref 70–99)
HCT VFR BLD CALC: 37.9 % — LOW (ref 39–50)
HGB BLD-MCNC: 12.3 G/DL — LOW (ref 13–17)
IMM GRANULOCYTES NFR BLD AUTO: 0.4 % — SIGNIFICANT CHANGE UP (ref 0–1.5)
INR BLD: 1.04 RATIO — SIGNIFICANT CHANGE UP (ref 0.88–1.16)
LYMPHOCYTES # BLD AUTO: 1.48 K/UL — SIGNIFICANT CHANGE UP (ref 1–3.3)
LYMPHOCYTES # BLD AUTO: 29.8 % — SIGNIFICANT CHANGE UP (ref 13–44)
MAGNESIUM SERPL-MCNC: 2.3 MG/DL — SIGNIFICANT CHANGE UP (ref 1.6–2.6)
MCHC RBC-ENTMCNC: 28.8 PG — SIGNIFICANT CHANGE UP (ref 27–34)
MCHC RBC-ENTMCNC: 32.5 GM/DL — SIGNIFICANT CHANGE UP (ref 32–36)
MCV RBC AUTO: 88.8 FL — SIGNIFICANT CHANGE UP (ref 80–100)
MONOCYTES # BLD AUTO: 0.69 K/UL — SIGNIFICANT CHANGE UP (ref 0–0.9)
MONOCYTES NFR BLD AUTO: 13.9 % — SIGNIFICANT CHANGE UP (ref 2–14)
NEUTROPHILS # BLD AUTO: 2.61 K/UL — SIGNIFICANT CHANGE UP (ref 1.8–7.4)
NEUTROPHILS NFR BLD AUTO: 52.5 % — SIGNIFICANT CHANGE UP (ref 43–77)
NRBC # BLD: 0 /100 WBCS — SIGNIFICANT CHANGE UP (ref 0–0)
PLATELET # BLD AUTO: 192 K/UL — SIGNIFICANT CHANGE UP (ref 150–400)
POTASSIUM SERPL-MCNC: 4.6 MMOL/L — SIGNIFICANT CHANGE UP (ref 3.5–5.3)
POTASSIUM SERPL-SCNC: 4.6 MMOL/L — SIGNIFICANT CHANGE UP (ref 3.5–5.3)
PROT SERPL-MCNC: 7.9 G/DL — SIGNIFICANT CHANGE UP (ref 6–8.3)
PROTHROM AB SERPL-ACNC: 12 SEC — SIGNIFICANT CHANGE UP (ref 10.5–13.4)
RBC # BLD: 4.27 M/UL — SIGNIFICANT CHANGE UP (ref 4.2–5.8)
RBC # FLD: 16.2 % — HIGH (ref 10.3–14.5)
SODIUM SERPL-SCNC: 143 MMOL/L — SIGNIFICANT CHANGE UP (ref 135–145)
WBC # BLD: 4.97 K/UL — SIGNIFICANT CHANGE UP (ref 3.8–10.5)
WBC # FLD AUTO: 4.97 K/UL — SIGNIFICANT CHANGE UP (ref 3.8–10.5)

## 2022-07-02 PROCEDURE — 70496 CT ANGIOGRAPHY HEAD: CPT | Mod: 26,MG

## 2022-07-02 PROCEDURE — 70498 CT ANGIOGRAPHY NECK: CPT | Mod: 26,MG

## 2022-07-02 PROCEDURE — G1004: CPT

## 2022-07-02 PROCEDURE — 99285 EMERGENCY DEPT VISIT HI MDM: CPT | Mod: 25

## 2022-07-02 NOTE — ED ADULT NURSE NOTE - OBJECTIVE STATEMENT
67 yo M pt no PMHx p/w lightheadedness when standing for the past 20 yrs worsened over the past 3 weeks. pt endorses these bouts of dizziness increase about once a year. pt reports having an unremarkable MRI done in the 90s. pt takes Aleve which seems to help.  pt is A&Ox4, MAEW, PERRL, no facial droop, no unilateral weakness, sensation grossly intact, skin warm dry intact/normal for race with cap refill<2 seconds.  Pt denies headache, chest pain, palpitations, cough, SOB, abdominal pain, n/v/d, urinary symptoms, fevers, chills, weakness at this time.

## 2022-07-02 NOTE — ED PROVIDER NOTE - PROGRESS NOTE DETAILS
cholestatic and hepatic process in cmp noted. RUQ ordered. Adalid Kwan MD. pt noted to have elevated LFTs/transaminitis. denies any recent travel, diarrhea, fevers, etoh use, tylenol use. abd is soft nt nd. not jaundiced. afebrile. low suspicion for cholangitis. low suspicion for cholecystitis or choledocholithiasis as pt non tender. will add on RUQ US. will plan to admit pt for the elevated lfts.

## 2022-07-02 NOTE — ED PROVIDER NOTE - OBJECTIVE STATEMENT
66M with PMHx/PSHx including HTN, s/p nephrectomy presents to the ED with 3 weeks of frequent episodes of dizziness. Patient describes dizziness as lightheadedness and near-syncopal. Endorses nausea, and loss of appetite. Denies vertigo, smoking hx, hx of recreational drug use, or excessive EtOH use. He reports relief from symptoms with taking aleve, and with sitting down. Reports symptoms are aggravated with standing and walking. Reports some episodes last for as long as a day. He presents to the ED today because he usually only experiences episodes of dizziness for at most a week. Reports taking 2 doses of aleve today. Denies visionary changes, numbness, tingling, weakness, CP, difficulty breathing, SOB, vomiting, or symptoms currently. Pt is currently seeing an nephrologist for increased urinary frequency at night. 66M with PMHx/PSHx including HTN, s/p nephrectomy presents to the ED with 3 weeks of frequent episodes of dizziness. Patient describes dizziness as lightheadedness. Endorses nausea, and loss of appetite. Denies vertigo, smoking hx, hx of recreational drug use, or excessive EtOH use. He reports relief from symptoms with taking aleve, and with sitting down. Reports symptoms are aggravated with standing and walking. Reports some episodes last for as long as a day. He presents to the ED today because he usually only experiences episodes of dizziness for at most a week. Reports taking 2 doses of aleve today. Denies visionary changes, numbness, tingling, weakness, CP, difficulty breathing, SOB, vomiting, or symptoms currently. Pt is currently seeing an nephrologist for increased urinary frequency at night.

## 2022-07-02 NOTE — ED PROVIDER NOTE - RAPID ASSESSMENT
66y M w/ pmhx HTN presents to the ED c/o dizziness and nausea x3wks. Denies sensation of room spinning. States Aleve provides slight relief. Denies vision changes, paraesthesia, vomiting. Denies fall. Pt is well appearing in triage.    Helene JOHNSON) have documented this rapid assessment note under the dictation of Karen Lazar (PA) which has been reviewed and affirmed to be accurate. Patient was seen as a QPA patient. The patient will be seen and further worked up in the main emergency department and their care will be completed by the main emergency department team along with a thorough physical exam. Receiving team will follow up on labs, analgesia, any clinical imaging, reassess and disposition as clinically indicated, all decisions regarding the progression of care will be made at their discretion. 66y M w/ pmhx HTN presents to the ED c/o dizziness and nausea x3wks. Denies sensation of room spinning. States Aleve provides slight relief. Denies vision changes, paraesthesia, vomiting. Denies fall. Pt is well appearing in triage.    Helene JOHNSON (Solitario) have documented this rapid assessment note under the dictation of Karen Lazar (PA) which has been reviewed and affirmed to be accurate. Patient was seen as a QPA patient. The patient will be seen and further worked up in the main emergency department and their care will be completed by the main emergency department team along with a thorough physical exam. Receiving team will follow up on labs, analgesia, any clinical imaging, reassess and disposition as clinically indicated, all decisions regarding the progression of care will be made at their discretion.    Rapid assessment by Karen Lazar PA-C full eval to be performed in ED. Above documentation completed by scribe above. I was present for and agree with documentation.   Karen Lazar PA-C

## 2022-07-02 NOTE — ED PROVIDER NOTE - CLINICAL SUMMARY MEDICAL DECISION MAKING FREE TEXT BOX
66M with PMHx/PSHx including HTN, s/p nephrectomy presents to the ED with 3 weeks of frequent episodes of dizziness. admits to lightheadedness without CP or SOB. admits to worse with standing, associated with N/V. no active episode at bedside. no vertiginous symptoms. He denies any sick contacts at home. no fevers. no abdominal pain. the differential diagnoses includes, but not limited to: orthostatics vs vasovagal vs metabolic derangements. unlikely cardiac cause of symptoms given presentations. will order labs, imaging, ekg, meds, reassess

## 2022-07-02 NOTE — ED PROVIDER NOTE - ATTENDING CONTRIBUTION TO CARE
I, Adalid Kwan, performed a history and physical exam of the patient and discussed their management with the resident and/or advanced care provider. I reviewed the resident and/or advanced care provider's note and agree with the documented findings and plan of care. I was present and available for all procedures.     66M with PMHx/PSHx including HTN, s/p nephrectomy presents for weeks of dizziness described as lightheadedness, daily episodes. Also w/ nausea and decreased appetite. Denies abd pain, vomiting, etoh use, tylenol use, headaches, cp, sob.     Exam:   PHYSICAL EXAM:  GENERAL: non-toxic appearing; in no respiratory distress  HEAD Atraumatic, Normocephalic  NECK: No JVD; trachea midline  EYES: PERRL, EOMs intact b/l w/out deficits; normal conjunctiva (no scleral icterus)  CHEST/LUNG: CTAB no wheezes/rhonchi/rales  HEART: RRR no murmur/gallops/rubs  ABDOMEN: soft, NT, ND  EXTREMITIES: No LE edema, +2 radial pulses b/l, +2 DP/PT pulses b/l  MUSCULOSKELETAL: FROM of all 4 extremities  NERVOUS SYSTEM:  A&Ox3, No motor deficits or sensory deficits; CNII-XII intact; Speech is fluent and appropriate; no dysmetria; no dysdiadochokinesia;   SKIN:  Warm and dry as visualized     MDM: pt with dizziness x3 weeks, nausea, decrased po intake. pt non focal neurologic exam now however, given persistence of daily symptoms, will check labs, CTA head/neck to eval for posterior stroke/dissection. Will check labs to eval for lyte abnormalities, anemia.

## 2022-07-03 DIAGNOSIS — N18.9 CHRONIC KIDNEY DISEASE, UNSPECIFIED: ICD-10-CM

## 2022-07-03 DIAGNOSIS — R74.01 ELEVATION OF LEVELS OF LIVER TRANSAMINASE LEVELS: ICD-10-CM

## 2022-07-03 DIAGNOSIS — Z29.9 ENCOUNTER FOR PROPHYLACTIC MEASURES, UNSPECIFIED: ICD-10-CM

## 2022-07-03 DIAGNOSIS — R42 DIZZINESS AND GIDDINESS: ICD-10-CM

## 2022-07-03 DIAGNOSIS — I10 ESSENTIAL (PRIMARY) HYPERTENSION: ICD-10-CM

## 2022-07-03 LAB
A1C WITH ESTIMATED AVERAGE GLUCOSE RESULT: 6.8 % — HIGH (ref 4–5.6)
ALBUMIN SERPL ELPH-MCNC: 3.8 G/DL — SIGNIFICANT CHANGE UP (ref 3.3–5)
ALP SERPL-CCNC: 448 U/L — HIGH (ref 40–120)
ALT FLD-CCNC: 406 U/L — HIGH (ref 10–45)
ANION GAP SERPL CALC-SCNC: 9 MMOL/L — SIGNIFICANT CHANGE UP (ref 5–17)
APPEARANCE UR: CLEAR — SIGNIFICANT CHANGE UP
AST SERPL-CCNC: 211 U/L — HIGH (ref 10–40)
BILIRUB SERPL-MCNC: 1.3 MG/DL — HIGH (ref 0.2–1.2)
BILIRUB UR-MCNC: NEGATIVE — SIGNIFICANT CHANGE UP
BUN SERPL-MCNC: 21 MG/DL — SIGNIFICANT CHANGE UP (ref 7–23)
CALCIUM SERPL-MCNC: 8.9 MG/DL — SIGNIFICANT CHANGE UP (ref 8.4–10.5)
CHLORIDE SERPL-SCNC: 106 MMOL/L — SIGNIFICANT CHANGE UP (ref 96–108)
CK MB BLD-MCNC: 1 % — SIGNIFICANT CHANGE UP (ref 0–3.5)
CK MB CFR SERPL CALC: 1.8 NG/ML — SIGNIFICANT CHANGE UP (ref 0–6.7)
CK SERPL-CCNC: 177 U/L — SIGNIFICANT CHANGE UP (ref 30–200)
CO2 SERPL-SCNC: 27 MMOL/L — SIGNIFICANT CHANGE UP (ref 22–31)
COLOR SPEC: YELLOW — SIGNIFICANT CHANGE UP
CREAT SERPL-MCNC: 1.27 MG/DL — SIGNIFICANT CHANGE UP (ref 0.5–1.3)
DIFF PNL FLD: NEGATIVE — SIGNIFICANT CHANGE UP
EGFR: 62 ML/MIN/1.73M2 — SIGNIFICANT CHANGE UP
ESTIMATED AVERAGE GLUCOSE: 148 MG/DL — HIGH (ref 68–114)
GGT SERPL-CCNC: 795 U/L — HIGH (ref 9–50)
GLUCOSE SERPL-MCNC: 91 MG/DL — SIGNIFICANT CHANGE UP (ref 70–99)
GLUCOSE UR QL: ABNORMAL
HAV IGM SER-ACNC: SIGNIFICANT CHANGE UP
HBV CORE IGM SER-ACNC: SIGNIFICANT CHANGE UP
HBV SURFACE AG SER-ACNC: SIGNIFICANT CHANGE UP
HCT VFR BLD CALC: 39.6 % — SIGNIFICANT CHANGE UP (ref 39–50)
HCV AB S/CO SERPL IA: 0.13 S/CO — SIGNIFICANT CHANGE UP (ref 0–0.99)
HCV AB SERPL-IMP: SIGNIFICANT CHANGE UP
HGB BLD-MCNC: 12.8 G/DL — LOW (ref 13–17)
HIV 1+2 AB+HIV1 P24 AG SERPL QL IA: SIGNIFICANT CHANGE UP
KETONES UR-MCNC: SIGNIFICANT CHANGE UP
LEUKOCYTE ESTERASE UR-ACNC: NEGATIVE — SIGNIFICANT CHANGE UP
MAGNESIUM SERPL-MCNC: 2.2 MG/DL — SIGNIFICANT CHANGE UP (ref 1.6–2.6)
MCHC RBC-ENTMCNC: 28.7 PG — SIGNIFICANT CHANGE UP (ref 27–34)
MCHC RBC-ENTMCNC: 32.3 GM/DL — SIGNIFICANT CHANGE UP (ref 32–36)
MCV RBC AUTO: 88.8 FL — SIGNIFICANT CHANGE UP (ref 80–100)
NITRITE UR-MCNC: NEGATIVE — SIGNIFICANT CHANGE UP
NRBC # BLD: 0 /100 WBCS — SIGNIFICANT CHANGE UP (ref 0–0)
NT-PROBNP SERPL-SCNC: 81 PG/ML — SIGNIFICANT CHANGE UP (ref 0–300)
PH UR: 6 — SIGNIFICANT CHANGE UP (ref 5–8)
PHOSPHATE SERPL-MCNC: 3.2 MG/DL — SIGNIFICANT CHANGE UP (ref 2.5–4.5)
PLATELET # BLD AUTO: 178 K/UL — SIGNIFICANT CHANGE UP (ref 150–400)
POTASSIUM SERPL-MCNC: 4.1 MMOL/L — SIGNIFICANT CHANGE UP (ref 3.5–5.3)
POTASSIUM SERPL-SCNC: 4.1 MMOL/L — SIGNIFICANT CHANGE UP (ref 3.5–5.3)
PROT SERPL-MCNC: 7.3 G/DL — SIGNIFICANT CHANGE UP (ref 6–8.3)
PROT UR-MCNC: ABNORMAL
RBC # BLD: 4.46 M/UL — SIGNIFICANT CHANGE UP (ref 4.2–5.8)
RBC # FLD: 16.1 % — HIGH (ref 10.3–14.5)
SARS-COV-2 RNA SPEC QL NAA+PROBE: SIGNIFICANT CHANGE UP
SODIUM SERPL-SCNC: 142 MMOL/L — SIGNIFICANT CHANGE UP (ref 135–145)
SP GR SPEC: 1.03 — HIGH (ref 1.01–1.02)
TROPONIN T, HIGH SENSITIVITY RESULT: 11 NG/L — SIGNIFICANT CHANGE UP (ref 0–51)
TSH SERPL-MCNC: 0.66 UIU/ML — SIGNIFICANT CHANGE UP (ref 0.27–4.2)
UROBILINOGEN FLD QL: ABNORMAL
WBC # BLD: 4.02 K/UL — SIGNIFICANT CHANGE UP (ref 3.8–10.5)
WBC # FLD AUTO: 4.02 K/UL — SIGNIFICANT CHANGE UP (ref 3.8–10.5)

## 2022-07-03 PROCEDURE — 76700 US EXAM ABDOM COMPLETE: CPT | Mod: 26

## 2022-07-03 PROCEDURE — 99223 1ST HOSP IP/OBS HIGH 75: CPT

## 2022-07-03 PROCEDURE — 12345: CPT | Mod: NC

## 2022-07-03 PROCEDURE — 99223 1ST HOSP IP/OBS HIGH 75: CPT | Mod: GC

## 2022-07-03 RX ORDER — ENOXAPARIN SODIUM 100 MG/ML
40 INJECTION SUBCUTANEOUS EVERY 24 HOURS
Refills: 0 | Status: DISCONTINUED | OUTPATIENT
Start: 2022-07-03 | End: 2022-07-06

## 2022-07-03 RX ORDER — ONDANSETRON 8 MG/1
4 TABLET, FILM COATED ORAL EVERY 6 HOURS
Refills: 0 | Status: DISCONTINUED | OUTPATIENT
Start: 2022-07-03 | End: 2022-07-08

## 2022-07-03 RX ORDER — AMLODIPINE BESYLATE 2.5 MG/1
10 TABLET ORAL DAILY
Refills: 0 | Status: DISCONTINUED | OUTPATIENT
Start: 2022-07-03 | End: 2022-07-08

## 2022-07-03 RX ORDER — ACETAMINOPHEN 500 MG
650 TABLET ORAL EVERY 6 HOURS
Refills: 0 | Status: DISCONTINUED | OUTPATIENT
Start: 2022-07-03 | End: 2022-07-03

## 2022-07-03 RX ADMIN — AMLODIPINE BESYLATE 10 MILLIGRAM(S): 2.5 TABLET ORAL at 05:56

## 2022-07-03 RX ADMIN — ENOXAPARIN SODIUM 40 MILLIGRAM(S): 100 INJECTION SUBCUTANEOUS at 06:23

## 2022-07-03 NOTE — CONSULT NOTE ADULT - SUBJECTIVE AND OBJECTIVE BOX
HPI:  LORRAINE STRICKLAND is a 66 year old male with history of HTN, BPH, renal mass s/p nephrectomy who presents with lightheadedness.    Patient presents with lightheadedness, which appears chronic, however a/w epigastric/RUQ pain over the past week with nausea.  No subjective fevers or chills at home.  Also endorses subjective weight loss at home.  Upon presentation to [facility] ED, patient afebrile and hemodynamically stable.    ROS:   General:  No fevers, chills, night sweats, fatigue  Eyes:  Good vision, no reported pain  ENT:  No sore throat, pain, runny nose  CV:  No pain, palpitations  Pulm:  No dyspnea, cough  GI:  See HPI, otherwise negative  :  No  incontinence, nocturia  Muscle:  No pain, weakness  Neuro:  No memory problems  Psych:  No insomnia, mood problems, depression  Endocrine:  No polyuria, polydipsia, cold/heat intolerance  Heme:  No petechiae, ecchymosis, easy bruisability  Skin:  No rash    PMHX/PSHX:    Hypertension    BPH (benign prostatic hyperplasia)    S/P hemorrhoidectomy    S/p nephrectomy      Allergies:  No Known Allergies      Home Medications: reviewed  Hospital Medications:  aluminum hydroxide/magnesium hydroxide/simethicone Suspension 30 milliLiter(s) Oral every 6 hours PRN  amLODIPine   Tablet 10 milliGRAM(s) Oral daily  enoxaparin Injectable 40 milliGRAM(s) SubCutaneous every 24 hours  ondansetron Injectable 4 milliGRAM(s) IV Push every 6 hours PRN      Social History:   Tobacco: denies  Alcohol: denies  Recreational drugs: denies    Family history:    No pertinent family history in first degree relatives      Denies family history of colon cancer/polyps, stomach cancer/polyps, pancreatic cancer/masses, liver cancer/disease, ovarian cancer and endometrial cancer.    PHYSICAL EXAM:   Vital Signs:  Vital Signs Last 24 Hrs  T(C): 36.4 (03 Jul 2022 04:18), Max: 36.8 (02 Jul 2022 20:11)  T(F): 97.5 (03 Jul 2022 04:18), Max: 98.2 (02 Jul 2022 20:11)  HR: 59 (03 Jul 2022 05:54) (59 - 84)  BP: 144/70 (03 Jul 2022 05:54) (124/71 - 165/92)  BP(mean): --  RR: 18 (03 Jul 2022 04:18) (17 - 18)  SpO2: 99% (03 Jul 2022 04:18) (97% - 100%)  Daily Height in cm: 172.72 (02 Jul 2022 17:33)    Daily     GENERAL: no acute distress  NEURO: alert  HEENT: NCAT, no conjunctival pallor appreciated  CHEST: no respiratory distress, no accessory muscle use  CARDIAC: regular rate, +S1/S2  ABDOMEN: soft, nondistended, mild epigastric/ruq tenderness to palpation, no rebound or guarding  EXTREMITIES: warm, well perfused  SKIN: no lesions noted    LABS: reviewed                        12.8   4.02  )-----------( 178      ( 03 Jul 2022 05:55 )             39.6     07-03    142  |  106  |  21  ----------------------------<  91  4.1   |  27  |  1.27    Ca    8.9      03 Jul 2022 05:55  Phos  3.2     07-03  Mg     2.2     07-03    TPro  7.3  /  Alb  3.8  /  TBili  1.3<H>  /  DBili  x   /  AST  211<H>  /  ALT  406<H>  /  AlkPhos  448<H>  07-03    LIVER FUNCTIONS - ( 03 Jul 2022 05:55 )  Alb: 3.8 g/dL / Pro: 7.3 g/dL / ALK PHOS: 448 U/L / ALT: 406 U/L / AST: 211 U/L / GGT: x               Diagnostic Studies: see sunrise for full report

## 2022-07-03 NOTE — H&P ADULT - NSHPPHYSICALEXAM_GEN_ALL_CORE
VITALS:   T(C): 36.8 (07-02-22 @ 20:11), Max: 36.8 (07-02-22 @ 20:11)  HR: 64 (07-02-22 @ 20:11) (62 - 75)  BP: 165/92 (07-02-22 @ 20:11) (124/71 - 165/92)  RR: 17 (07-02-22 @ 20:11) (17 - 18)  SpO2: 100% (07-02-22 @ 20:11) (97% - 100%)    GENERAL: NAD, lying in bed comfortably  HEAD:  Atraumatic, Normocephalic  EYES: EOMI, PERRLA, conjunctiva and sclera clear  ENT: Moist mucous membranes  NECK: Supple, No JVD  CHEST/LUNG: Clear to auscultation bilaterally; No rales, rhonchi, wheezing, or rubs. Unlabored respirations  HEART: Regular rate and rhythm; No murmurs, rubs, or gallops  ABDOMEN: BSx4; Soft, nontender, nondistended  EXTREMITIES:  2+ Peripheral Pulses, brisk capillary refill. No clubbing, cyanosis, or edema  NERVOUS SYSTEM:  A&Ox3, no focal deficits   SKIN: No rashes or lesions  psych: normal behavior, normal affect VITALS:   T(C): 36.8 (07-02-22 @ 20:11), Max: 36.8 (07-02-22 @ 20:11)  HR: 64 (07-02-22 @ 20:11) (62 - 75)  BP: 165/92 (07-02-22 @ 20:11) (124/71 - 165/92)  RR: 17 (07-02-22 @ 20:11) (17 - 18)  SpO2: 100% (07-02-22 @ 20:11) (97% - 100%)    GENERAL: NAD, lying in bed comfortably  HEAD:  Atraumatic, Normocephalic  EYES: EOMI, PERRLA, scleral icterus    ENT: Moist mucous membranes  NECK: Supple, No JVD  CHEST/LUNG: Clear to auscultation bilaterally; No rales, rhonchi, wheezing, or rubs. Unlabored respirations  HEART: Regular rate and rhythm; No murmurs, rubs, or gallops  ABDOMEN: BSx4; Soft, nondistended, hepatomegaly, negative hoyos sign, mildly tender to deep palpation in the epigastrium   EXTREMITIES:  2+ Peripheral Pulses, brisk capillary refill. No clubbing, cyanosis, or edema  NERVOUS SYSTEM:  A&Ox3, no focal deficits   SKIN: No rashes or lesions  psych: normal behavior, normal affect

## 2022-07-03 NOTE — H&P ADULT - NSHPLABSRESULTS_GEN_ALL_CORE
12.3   4.97  )-----------( 192      ( 02 Jul 2022 19:45 )             37.9       07-02    143  |  106  |  25<H>  ----------------------------<  95  4.6   |  26  |  1.52<H>    Ca    9.2      02 Jul 2022 19:45  Mg     2.3     07-02    TPro  7.9  /  Alb  4.4  /  TBili  1.3<H>  /  DBili  x   /  AST  326<H>  /  ALT  499<H>  /  AlkPhos  490<H>  07-02                  PT/INR - ( 02 Jul 2022 19:45 )   PT: 12.0 sec;   INR: 1.04 ratio         PTT - ( 02 Jul 2022 19:45 )  PTT:32.2 sec    Lactate Trend            CAPILLARY BLOOD GLUCOSE      IMPRESSION:    CT HEAD: No acuteintracranial pathology.    CTA BRAIN: Patent intracranial circulation. No flow-limiting stenosis or   occlusion.    CTA NECK: Patent cervical vasculature. No flow limiting stenosis or   occlusion.    --- End of Report ---

## 2022-07-03 NOTE — H&P ADULT - PROBLEM SELECTOR PLAN 4
SCr 1.62 in May 2022. Now SCr1.52. Likely ckd in the setting of hypertension. Patient is s/p nephrectomy.   -Renally dose medications

## 2022-07-03 NOTE — H&P ADULT - NSHPREVIEWOFSYSTEMS_GEN_ALL_CORE
REVIEW OF SYSTEMS:    CONSTITUTIONAL: No weakness, fevers or chills  EYES/ENT: No visual changes;  No vertigo or throat pain   NECK: No pain or stiffness  RESPIRATORY: No cough, wheezing, hemoptysis; No shortness of breath  CARDIOVASCULAR: No chest pain or palpitations  GASTROINTESTINAL: No abdominal or epigastric pain. No nausea, vomiting, or hematemesis; No diarrhea or constipation. No melena or hematochezia.  GENITOURINARY: No dysuria, frequency or hematuria  NEUROLOGICAL: No numbness or weakness  SKIN: No itching, rashes  psych: no anxiety or depression REVIEW OF SYSTEMS:    CONSTITUTIONAL: No weakness, fevers or chills  EYES/ENT: No visual changes;  No vertigo or throat pain   NECK: No pain or stiffness  RESPIRATORY: No cough, wheezing, hemoptysis; No shortness of breath  CARDIOVASCULAR: No chest pain or palpitations  GASTROINTESTINAL: + abdominal or epigastric pain. No nausea, vomiting, or hematemesis; No diarrhea or constipation. No melena or hematochezia.  GENITOURINARY: No dysuria, frequency or hematuria  NEUROLOGICAL: No numbness or weakness  SKIN: No itching, rashes  psych: no anxiety or depression

## 2022-07-03 NOTE — CONSULT NOTE ADULT - ATTENDING COMMENTS
This is a 66-year-old male with past medical history of hypertension, benign prostatic hyperplasia, history of renal mass status post nephrectomy currently presenting to the ED with episodes of lightheadedness.  I agree with the outlined history, physical examination, assessment and plan.  Patient denies any fever, chills, rigor or any abdominal symptoms.  Laboratory test from admission revealed no leukocytosis, hemoglobin 12.3 g/dL with a platelet count 192,000.  INR 1.04.  Liver enzymes however significantly elevated with a total bilirubin of 1.3 mg/dL, , , alkaline phosphatase 490.  Follow-up labs this afternoon again revealed persistent elevation although some improvement.  Total bilirubin is now 1.3 mg/dL, , , alkaline phosphatase 448 international units/mL.  An ultrasound of the abdomen revealed mild intrahepatic and extrahepatic biliary ductal dilatation common bile duct demonstrated mural thickening and narrowing.  Gallbladder did reveal sludge.  Acute hepatitis panel is negative.    I agree with plan for an MRCP with and without IV contrast for further evaluation.  Would have baseline blood/urine culture.  We will keep patient on IV Zosyn empirically.  Recommend consultation to advanced GI team.  Likely will need an ERCP electively.

## 2022-07-03 NOTE — H&P ADULT - ATTENDING COMMENTS
66M  pmhx HTN, renal mass s/p nephrectomy, BPH p/w lightheadedness. Found incidentally to have elevated transaminases on labs.     PLAN  -tele monitoring  -syncope w/u including TTE, TSH, orthostats  -RUQ US, acute hepatitis panel, GGT, HIV  -GI consult if e/o biliary disease    Rest of plan per resident note 66M  pmhx HTN, renal mass s/p nephrectomy, BPH p/w 3-week h/o dizziness. States ongoing for at least 30yrs, intermittent in nature and has had extensive w/u in the past that was unrevealing. He presented because it has been more persistent than usual. Denies it being a spinning sensation and not affected by head movement however he does get nauseous at times. On presentation he was incidentally found to have elevated LFTs. Denied ever drinking ,using recreational drugs or recent travel. He did report having mid-epigastric tenderness for a few days that has since resolved. Denies family h/o malignancy. Of note, he had recent urologic procedure and was given abx prophylactically following the procedure.    PLAN  -can trial meclizine although sxs not consistent with vertigo. can have neurology evaluate in house vs outpt f/u  -RUQ US, acute hepatitis panel, GGT, HIV  -can consider CT A/P to r/o pancreatic malignancy given c/o mid-epigastric tenderness    Rest of plan per resident note

## 2022-07-03 NOTE — H&P ADULT - HISTORY OF PRESENT ILLNESS
66 year old male with pmhx HTN  66 year old male with pmhx HTN, renal mass s/p nephrectomy, BPH who is presenting with lightheadedness.  66 year old male with pmhx HTN, renal mass s/p nephrectomy, BPH who is presenting with lightheadedness. The patient reports having episodes of lightheadedness that started over 30 years ago. He reports the episodes happen roughly once per year. No palpitations, CP or SOB associated with lightheadedness. No dizziness or tinnitus. In addition, he does report some mild diffuse abdominal pain over the last week. He reports he only really feels the pain when he presses on his abdomen. Pain is not post-prandial. Some associated nausea and loss of appetite. No vomiting, diarrhea, constipation. Reports weight loss over the last 2-3 weeks but cannot report exactly how much. Normal stool color. No recent  tylenol use. No OTC vitamins or supplements. No recent travel.

## 2022-07-03 NOTE — H&P ADULT - PROBLEM SELECTOR PLAN 2
Chronic, unclear etiology. EKG NSR. CT head and CTA neck wnl. Possibly orthostatic hypotension in the setting of decreased PO intake.   -check orthostatics   -check A1c Chronic, unclear etiology. EKG NSR. CT head and CTA neck wnl. Possibly orthostatic hypotension in the setting of decreased PO intake.   -check orthostatics   -check A1c  -Check TTE  -Check TSH

## 2022-07-03 NOTE — H&P ADULT - NSICDXPASTMEDICALHX_GEN_ALL_CORE_FT
PAST MEDICAL HISTORY:  Hypertension      PAST MEDICAL HISTORY:  BPH (benign prostatic hyperplasia)     Hypertension

## 2022-07-03 NOTE — H&P ADULT - NSHPSOCIALHISTORY_GEN_ALL_CORE
Never smoked, no alcohol or drug use  Lives in [facility] [place] [facility] [place] with [place]  Daughter works as  for [employer] at [employer]  From [facility]

## 2022-07-03 NOTE — CONSULT NOTE ADULT - ASSESSMENT
66 year old male with history of HTN, BPH, renal mass s/p nephrectomy who presents with lightheadedness.    # Epigastric/RUQ tenderness  # Elevated liver chemistries  # Intra and extrahepatic biliary dilatation  # CBD 14mm  # GB sludge    Recommendations:  -trend clinical symptoms, exam findings, vital signs, CBC, CMP, INR  -complete infectious workup and f/u BCx  -aggressive IVF and empiric antibiotics following culture collection  -per [place] criteria, patient does not meet criteria for emergent ERCP  -recommend MRI/MRCP given clinical scenario and imaging findings    Note incomplete until finalized by attending signature/attestation.    Joseph Woodard  GI/Hepatology Fellow    MONDAY-FRIDAY 8AM-5PM:  Pager# [phone] ([facility]) or [phone] ([facility])    NON-URGENT CONSULTS:  Please email [email or web address]  AT NIGHT AND ON WEEKENDS:  Contact on-call GI fellow via answering service ([phone]) from 5pm-8am and on weekends/holidays

## 2022-07-03 NOTE — H&P ADULT - ASSESSMENT
66 year old male with PMHx of HTN presenting with lightheadedness found to have transaminitis  66 year old male with PMHx of HTN presenting with lightheadedness and abdominal pain found to have transaminitis  66 year old male with pmhx HTN, renal mass s/p nephrectomy, BPH who is presenting with lightheadedness and abdominal pain found to have transaminitis

## 2022-07-03 NOTE — H&P ADULT - PROBLEM SELECTOR PLAN 1
. . T bili 1.3. Unclear etiology. No tylenol or drug use that could cause DILI. Normal weight so unlikely to be HAMILTON. Denies alcohol or drug use. No recent travel.   DDx choledocholithiasis, hepatitis, malignancy  -f/u abdominal ultrasound  -If CBD dilated will need MRCP and GI consult for ERCP   -hepatitis panel, HIV  -Trend LFTs  -Avoid hepatotoxic agents

## 2022-07-04 DIAGNOSIS — E11.9 TYPE 2 DIABETES MELLITUS WITHOUT COMPLICATIONS: ICD-10-CM

## 2022-07-04 LAB
ALBUMIN SERPL ELPH-MCNC: 3.8 G/DL — SIGNIFICANT CHANGE UP (ref 3.3–5)
ALP SERPL-CCNC: 414 U/L — HIGH (ref 40–120)
ALT FLD-CCNC: 264 U/L — HIGH (ref 10–45)
ANION GAP SERPL CALC-SCNC: 10 MMOL/L — SIGNIFICANT CHANGE UP (ref 5–17)
AST SERPL-CCNC: 69 U/L — HIGH (ref 10–40)
BILIRUB SERPL-MCNC: 1.1 MG/DL — SIGNIFICANT CHANGE UP (ref 0.2–1.2)
BUN SERPL-MCNC: 21 MG/DL — SIGNIFICANT CHANGE UP (ref 7–23)
CALCIUM SERPL-MCNC: 8.9 MG/DL — SIGNIFICANT CHANGE UP (ref 8.4–10.5)
CHLORIDE SERPL-SCNC: 103 MMOL/L — SIGNIFICANT CHANGE UP (ref 96–108)
CHOLEST SERPL-MCNC: 171 MG/DL — SIGNIFICANT CHANGE UP
CO2 SERPL-SCNC: 26 MMOL/L — SIGNIFICANT CHANGE UP (ref 22–31)
CREAT SERPL-MCNC: 1.05 MG/DL — SIGNIFICANT CHANGE UP (ref 0.5–1.3)
EGFR: 77 ML/MIN/1.73M2 — SIGNIFICANT CHANGE UP
GLUCOSE BLDC GLUCOMTR-MCNC: 126 MG/DL — HIGH (ref 70–99)
GLUCOSE SERPL-MCNC: 162 MG/DL — HIGH (ref 70–99)
HBV CORE AB SER-ACNC: SIGNIFICANT CHANGE UP
HBV SURFACE AB SER-ACNC: SIGNIFICANT CHANGE UP
HCT VFR BLD CALC: 41.9 % — SIGNIFICANT CHANGE UP (ref 39–50)
HCV AB S/CO SERPL IA: 0.13 S/CO — SIGNIFICANT CHANGE UP (ref 0–0.99)
HCV AB SERPL-IMP: SIGNIFICANT CHANGE UP
HDLC SERPL-MCNC: 69 MG/DL — SIGNIFICANT CHANGE UP
HGB BLD-MCNC: 13.9 G/DL — SIGNIFICANT CHANGE UP (ref 13–17)
LDH SERPL L TO P-CCNC: 158 U/L — SIGNIFICANT CHANGE UP (ref 50–242)
LIPID PNL WITH DIRECT LDL SERPL: 92 MG/DL — SIGNIFICANT CHANGE UP
MCHC RBC-ENTMCNC: 28.8 PG — SIGNIFICANT CHANGE UP (ref 27–34)
MCHC RBC-ENTMCNC: 33.2 GM/DL — SIGNIFICANT CHANGE UP (ref 32–36)
MCV RBC AUTO: 86.9 FL — SIGNIFICANT CHANGE UP (ref 80–100)
NON HDL CHOLESTEROL: 102 MG/DL — SIGNIFICANT CHANGE UP
NRBC # BLD: 0 /100 WBCS — SIGNIFICANT CHANGE UP (ref 0–0)
PLATELET # BLD AUTO: 189 K/UL — SIGNIFICANT CHANGE UP (ref 150–400)
POTASSIUM SERPL-MCNC: 4 MMOL/L — SIGNIFICANT CHANGE UP (ref 3.5–5.3)
POTASSIUM SERPL-SCNC: 4 MMOL/L — SIGNIFICANT CHANGE UP (ref 3.5–5.3)
PROT SERPL-MCNC: 7.4 G/DL — SIGNIFICANT CHANGE UP (ref 6–8.3)
RBC # BLD: 4.82 M/UL — SIGNIFICANT CHANGE UP (ref 4.2–5.8)
RBC # FLD: 15.5 % — HIGH (ref 10.3–14.5)
SODIUM SERPL-SCNC: 139 MMOL/L — SIGNIFICANT CHANGE UP (ref 135–145)
TRIGL SERPL-MCNC: 52 MG/DL — SIGNIFICANT CHANGE UP
WBC # BLD: 4.86 K/UL — SIGNIFICANT CHANGE UP (ref 3.8–10.5)
WBC # FLD AUTO: 4.86 K/UL — SIGNIFICANT CHANGE UP (ref 3.8–10.5)

## 2022-07-04 PROCEDURE — 99232 SBSQ HOSP IP/OBS MODERATE 35: CPT | Mod: GC

## 2022-07-04 PROCEDURE — 99233 SBSQ HOSP IP/OBS HIGH 50: CPT

## 2022-07-04 RX ORDER — SODIUM CHLORIDE 9 MG/ML
1000 INJECTION, SOLUTION INTRAVENOUS
Refills: 0 | Status: DISCONTINUED | OUTPATIENT
Start: 2022-07-04 | End: 2022-07-08

## 2022-07-04 RX ORDER — INSULIN LISPRO 100/ML
VIAL (ML) SUBCUTANEOUS AT BEDTIME
Refills: 0 | Status: DISCONTINUED | OUTPATIENT
Start: 2022-07-04 | End: 2022-07-08

## 2022-07-04 RX ORDER — GLUCAGON INJECTION, SOLUTION 0.5 MG/.1ML
1 INJECTION, SOLUTION SUBCUTANEOUS ONCE
Refills: 0 | Status: DISCONTINUED | OUTPATIENT
Start: 2022-07-04 | End: 2022-07-08

## 2022-07-04 RX ORDER — DEXTROSE 50 % IN WATER 50 %
15 SYRINGE (ML) INTRAVENOUS ONCE
Refills: 0 | Status: DISCONTINUED | OUTPATIENT
Start: 2022-07-04 | End: 2022-07-08

## 2022-07-04 RX ORDER — PIPERACILLIN AND TAZOBACTAM 4; .5 G/20ML; G/20ML
3.38 INJECTION, POWDER, LYOPHILIZED, FOR SOLUTION INTRAVENOUS ONCE
Refills: 0 | Status: COMPLETED | OUTPATIENT
Start: 2022-07-04 | End: 2022-07-04

## 2022-07-04 RX ORDER — DEXTROSE 50 % IN WATER 50 %
25 SYRINGE (ML) INTRAVENOUS ONCE
Refills: 0 | Status: DISCONTINUED | OUTPATIENT
Start: 2022-07-04 | End: 2022-07-08

## 2022-07-04 RX ORDER — DEXTROSE 50 % IN WATER 50 %
12.5 SYRINGE (ML) INTRAVENOUS ONCE
Refills: 0 | Status: DISCONTINUED | OUTPATIENT
Start: 2022-07-04 | End: 2022-07-08

## 2022-07-04 RX ORDER — INSULIN LISPRO 100/ML
VIAL (ML) SUBCUTANEOUS
Refills: 0 | Status: DISCONTINUED | OUTPATIENT
Start: 2022-07-04 | End: 2022-07-08

## 2022-07-04 RX ORDER — PIPERACILLIN AND TAZOBACTAM 4; .5 G/20ML; G/20ML
3.38 INJECTION, POWDER, LYOPHILIZED, FOR SOLUTION INTRAVENOUS EVERY 8 HOURS
Refills: 0 | Status: DISCONTINUED | OUTPATIENT
Start: 2022-07-04 | End: 2022-07-08

## 2022-07-04 RX ADMIN — PIPERACILLIN AND TAZOBACTAM 25 GRAM(S): 4; .5 INJECTION, POWDER, LYOPHILIZED, FOR SOLUTION INTRAVENOUS at 21:27

## 2022-07-04 RX ADMIN — AMLODIPINE BESYLATE 10 MILLIGRAM(S): 2.5 TABLET ORAL at 07:01

## 2022-07-04 RX ADMIN — ENOXAPARIN SODIUM 40 MILLIGRAM(S): 100 INJECTION SUBCUTANEOUS at 07:01

## 2022-07-04 RX ADMIN — Medication 1 MILLIGRAM(S): at 14:31

## 2022-07-04 RX ADMIN — SODIUM CHLORIDE 100 MILLILITER(S): 9 INJECTION, SOLUTION INTRAVENOUS at 18:28

## 2022-07-04 RX ADMIN — PIPERACILLIN AND TAZOBACTAM 200 GRAM(S): 4; .5 INJECTION, POWDER, LYOPHILIZED, FOR SOLUTION INTRAVENOUS at 10:09

## 2022-07-04 RX ADMIN — Medication 30 MILLILITER(S): at 21:27

## 2022-07-04 RX ADMIN — PIPERACILLIN AND TAZOBACTAM 25 GRAM(S): 4; .5 INJECTION, POWDER, LYOPHILIZED, FOR SOLUTION INTRAVENOUS at 13:06

## 2022-07-04 NOTE — PROGRESS NOTE ADULT - ATTENDING COMMENTS
66M admitted w dizziness, epigastric pain, nausea.   Labs on admission significant for elevated liver enzymes. No leukocytosis.   Imaging showing biliary ductal dilatation w/ CBD to 14mm.     Concern for choledocholithiasis.   Awaiting MRCP   Agree w/ IVF and IV abx  f/u bcx   Trend cbc, cmp   NPO at MN for possible ercp tomorrow   Please page GI if fevers, AMS, or signs/symptoms of cholangitis (none currently).

## 2022-07-05 DIAGNOSIS — I95.1 ORTHOSTATIC HYPOTENSION: ICD-10-CM

## 2022-07-05 LAB
ALBUMIN SERPL ELPH-MCNC: 3.6 G/DL — SIGNIFICANT CHANGE UP (ref 3.3–5)
ALP SERPL-CCNC: 348 U/L — HIGH (ref 40–120)
ALT FLD-CCNC: 205 U/L — HIGH (ref 10–45)
ANION GAP SERPL CALC-SCNC: 10 MMOL/L — SIGNIFICANT CHANGE UP (ref 5–17)
AST SERPL-CCNC: 52 U/L — HIGH (ref 10–40)
BILIRUB SERPL-MCNC: 0.9 MG/DL — SIGNIFICANT CHANGE UP (ref 0.2–1.2)
BUN SERPL-MCNC: 19 MG/DL — SIGNIFICANT CHANGE UP (ref 7–23)
CALCIUM SERPL-MCNC: 8.8 MG/DL — SIGNIFICANT CHANGE UP (ref 8.4–10.5)
CHLORIDE SERPL-SCNC: 101 MMOL/L — SIGNIFICANT CHANGE UP (ref 96–108)
CHOLEST SERPL-MCNC: 166 MG/DL — SIGNIFICANT CHANGE UP
CO2 SERPL-SCNC: 28 MMOL/L — SIGNIFICANT CHANGE UP (ref 22–31)
CREAT SERPL-MCNC: 1.48 MG/DL — HIGH (ref 0.5–1.3)
EGFR: 51 ML/MIN/1.73M2 — LOW
GLUCOSE BLDC GLUCOMTR-MCNC: 104 MG/DL — HIGH (ref 70–99)
GLUCOSE BLDC GLUCOMTR-MCNC: 147 MG/DL — HIGH (ref 70–99)
GLUCOSE BLDC GLUCOMTR-MCNC: 154 MG/DL — HIGH (ref 70–99)
GLUCOSE BLDC GLUCOMTR-MCNC: 90 MG/DL — SIGNIFICANT CHANGE UP (ref 70–99)
GLUCOSE SERPL-MCNC: 111 MG/DL — HIGH (ref 70–99)
HCT VFR BLD CALC: 42 % — SIGNIFICANT CHANGE UP (ref 39–50)
HDLC SERPL-MCNC: 61 MG/DL — SIGNIFICANT CHANGE UP
HGB BLD-MCNC: 13.4 G/DL — SIGNIFICANT CHANGE UP (ref 13–17)
LIPID PNL WITH DIRECT LDL SERPL: 94 MG/DL — SIGNIFICANT CHANGE UP
MAGNESIUM SERPL-MCNC: 2.1 MG/DL — SIGNIFICANT CHANGE UP (ref 1.6–2.6)
MCHC RBC-ENTMCNC: 28.5 PG — SIGNIFICANT CHANGE UP (ref 27–34)
MCHC RBC-ENTMCNC: 31.9 GM/DL — LOW (ref 32–36)
MCV RBC AUTO: 89.2 FL — SIGNIFICANT CHANGE UP (ref 80–100)
NON HDL CHOLESTEROL: 105 MG/DL — SIGNIFICANT CHANGE UP
NRBC # BLD: 0 /100 WBCS — SIGNIFICANT CHANGE UP (ref 0–0)
PHOSPHATE SERPL-MCNC: 2.9 MG/DL — SIGNIFICANT CHANGE UP (ref 2.5–4.5)
PLATELET # BLD AUTO: 192 K/UL — SIGNIFICANT CHANGE UP (ref 150–400)
POTASSIUM SERPL-MCNC: 3.7 MMOL/L — SIGNIFICANT CHANGE UP (ref 3.5–5.3)
POTASSIUM SERPL-SCNC: 3.7 MMOL/L — SIGNIFICANT CHANGE UP (ref 3.5–5.3)
PROT SERPL-MCNC: 7.1 G/DL — SIGNIFICANT CHANGE UP (ref 6–8.3)
RBC # BLD: 4.71 M/UL — SIGNIFICANT CHANGE UP (ref 4.2–5.8)
RBC # FLD: 15.7 % — HIGH (ref 10.3–14.5)
SARS-COV-2 RNA SPEC QL NAA+PROBE: SIGNIFICANT CHANGE UP
SODIUM SERPL-SCNC: 139 MMOL/L — SIGNIFICANT CHANGE UP (ref 135–145)
TRIGL SERPL-MCNC: 55 MG/DL — SIGNIFICANT CHANGE UP
WBC # BLD: 4.22 K/UL — SIGNIFICANT CHANGE UP (ref 3.8–10.5)
WBC # FLD AUTO: 4.22 K/UL — SIGNIFICANT CHANGE UP (ref 3.8–10.5)

## 2022-07-05 PROCEDURE — 99232 SBSQ HOSP IP/OBS MODERATE 35: CPT | Mod: GC

## 2022-07-05 PROCEDURE — 93306 TTE W/DOPPLER COMPLETE: CPT | Mod: 26,59

## 2022-07-05 PROCEDURE — 99232 SBSQ HOSP IP/OBS MODERATE 35: CPT

## 2022-07-05 RX ORDER — SODIUM CHLORIDE 9 MG/ML
1000 INJECTION, SOLUTION INTRAVENOUS
Refills: 0 | Status: DISCONTINUED | OUTPATIENT
Start: 2022-07-05 | End: 2022-07-06

## 2022-07-05 RX ADMIN — SODIUM CHLORIDE 75 MILLILITER(S): 9 INJECTION, SOLUTION INTRAVENOUS at 08:28

## 2022-07-05 RX ADMIN — AMLODIPINE BESYLATE 10 MILLIGRAM(S): 2.5 TABLET ORAL at 05:35

## 2022-07-05 RX ADMIN — Medication 30 MILLILITER(S): at 22:13

## 2022-07-05 RX ADMIN — ENOXAPARIN SODIUM 40 MILLIGRAM(S): 100 INJECTION SUBCUTANEOUS at 05:35

## 2022-07-05 RX ADMIN — SODIUM CHLORIDE 75 MILLILITER(S): 9 INJECTION, SOLUTION INTRAVENOUS at 21:29

## 2022-07-05 RX ADMIN — PIPERACILLIN AND TAZOBACTAM 25 GRAM(S): 4; .5 INJECTION, POWDER, LYOPHILIZED, FOR SOLUTION INTRAVENOUS at 12:47

## 2022-07-05 RX ADMIN — PIPERACILLIN AND TAZOBACTAM 25 GRAM(S): 4; .5 INJECTION, POWDER, LYOPHILIZED, FOR SOLUTION INTRAVENOUS at 05:35

## 2022-07-05 RX ADMIN — PIPERACILLIN AND TAZOBACTAM 25 GRAM(S): 4; .5 INJECTION, POWDER, LYOPHILIZED, FOR SOLUTION INTRAVENOUS at 21:29

## 2022-07-05 NOTE — DIETITIAN INITIAL EVALUATION ADULT - PERTINENT LABORATORY DATA
(07/05) Cr 1.48, , Alkaline Phosphatase 348, AST 52,   Finger sticks: (07/05) 104 (07/04) 126   (07/03) lipid profile WNL   A1C with Estimated Average Glucose Result: 6.8 % (07-03-22 @ 05:55)

## 2022-07-05 NOTE — DIETITIAN NUTRITION RISK NOTIFICATION - TREATMENT: THE FOLLOWING DIET HAS BEEN RECOMMENDED
Diet, NPO after Midnight:      NPO Start Date: 05-Jul-2022,   NPO Start Time: 23:59 (07-05-22 @ 10:32) [Active]  Diet, DASH/TLC:   Sodium & Cholesterol Restricted  Consistent Carbohydrate {Evening Snack} (CSTCHOSN) (07-04-22 @ 18:22) [Active]

## 2022-07-05 NOTE — DIETITIAN INITIAL EVALUATION ADULT - ORAL INTAKE PTA/DIET HISTORY
Pt reports decreased PO intake x 2-3 weeks PTA due to lightheadedness, previously with good appetite and PO intake. Confirms NKFA. Denies taking any vitamins/minerals PTA; states drinking either Ensure High Protein or Original 1xday. Pt reports not following any type of diet or restriction at home. Denies DM or pre-DM; HbA1c (07/03) 6.8% - spoke to MAURICE Cook.

## 2022-07-05 NOTE — DIETITIAN INITIAL EVALUATION ADULT - EDUCATION DIETARY MODIFICATIONS
NP made aware RD remains available for education upon request/(1) partially meets; needs review/practice/verbalization

## 2022-07-05 NOTE — CHART NOTE - NSCHARTNOTEFT_GEN_A_CORE
Nutrition note     Dietitian Initial Evaluation completed earlier today (07/05). Pt seen again for DM education - diagnosed with DM by NP.     Provided education on T2DM nutrition therapy. Provided education on foods containing carbohydrates, foods containing proteins, and portion sizes. Stressed the importance of a balanced meal with Consistent Carbohydrates to maintain blood glucose. Encouraged vegetables consumption. Described HbA1c and stressed importance of its normal levels. Recommended water consumption with avoidance of soda and juice. Discussed to avoid concentrated sweets. Discussed nutrition label reading. All questions answered.     NP made aware education was provided.     Will continue to monitor PO intake, weight, labs, skin, GI status, and diet as able.   Cherelle Jauregui MS RDN CDN [facility] CCTD #[phone]

## 2022-07-05 NOTE — DIETITIAN INITIAL EVALUATION ADULT - PERTINENT MEDS FT
MEDICATIONS  (STANDING):  amLODIPine   Tablet 10 milliGRAM(s) Oral daily  dextrose 5%. 1000 milliLiter(s) (50 mL/Hr) IV Continuous <Continuous>  dextrose 5%. 1000 milliLiter(s) (100 mL/Hr) IV Continuous <Continuous>  dextrose 50% Injectable 25 Gram(s) IV Push once  dextrose 50% Injectable 12.5 Gram(s) IV Push once  dextrose 50% Injectable 25 Gram(s) IV Push once  enoxaparin Injectable 40 milliGRAM(s) SubCutaneous every 24 hours  glucagon  Injectable 1 milliGRAM(s) IntraMuscular once  insulin lispro (ADMELOG) corrective regimen sliding scale   SubCutaneous three times a day before meals  insulin lispro (ADMELOG) corrective regimen sliding scale   SubCutaneous at bedtime  lactated ringers. 1000 milliLiter(s) (100 mL/Hr) IV Continuous <Continuous>  lactated ringers. 1000 milliLiter(s) (75 mL/Hr) IV Continuous <Continuous>  piperacillin/tazobactam IVPB.. 3.375 Gram(s) IV Intermittent every 8 hours    MEDICATIONS  (PRN):  aluminum hydroxide/magnesium hydroxide/simethicone Suspension 30 milliLiter(s) Oral every 6 hours PRN Dyspepsia  dextrose Oral Gel 15 Gram(s) Oral once PRN Blood Glucose LESS THAN 70 milliGRAM(s)/deciliter  ondansetron Injectable 4 milliGRAM(s) IV Push every 6 hours PRN Nausea and/or Vomiting

## 2022-07-05 NOTE — CHART NOTE - NSCHARTNOTEFT_GEN_A_CORE
Brief Advanced GI F/u Note    Tentative plans for EUS/ERCP tomorrow  - NPO after MN  - Please obtain repeat COVID PCR today   - Obtain AM labs early (3am) to avoid procedure delays      Thank you for involving us in the care of this patient, please reach out if any further questions.     Rúal Jones MD  Gastroenterology/Hepatology Fellow, PGY5    Available on Microsoft Teams  [phone] ([facility])  [id number] ([facility])  Please contact on call fellow weekdays after 5pm-7am and weekends: [phone]

## 2022-07-05 NOTE — DIETITIAN INITIAL EVALUATION ADULT - REASON FOR ADMISSION
Pt 65 y/o M with PMH as per chart: HTN, renal mass S/P nephrectomy, BPH who is presenting with lightheadedness, found with transaminitis, MRI/MRCP aborted due to patient agitation, DM.

## 2022-07-05 NOTE — DIETITIAN INITIAL EVALUATION ADULT - NS FNS DIET ORDER
Diet, NPO after Midnight:      NPO Start Date: 05-Jul-2022,   NPO Start Time: 23:59 (07-05-22 @ 10:32)  Diet, DASH/TLC:   Sodium & Cholesterol Restricted  Consistent Carbohydrate {Evening Snack} (CSTCHOSN) (07-04-22 @ 18:22)

## 2022-07-05 NOTE — DIETITIAN INITIAL EVALUATION ADULT - DIET TYPE
1. Recommend Consistent Carbohydrate with snack + low salt diet. Will continue to monitor as able and adjust as needed. 2. Recommend Glucerna Shake 240mls 2x daily (440kcals, 20g protein) to optimize kcal and protein intake. Spoke to NP.

## 2022-07-05 NOTE — DIETITIAN INITIAL EVALUATION ADULT - SIGNS/SYMPTOMS
PO intake <75% with 8.3% weight loss x 2-3 weeks; signs of muscle/fat loss  HbA1c 6.8% - pt unaware

## 2022-07-05 NOTE — DIETITIAN INITIAL EVALUATION ADULT - ENERGY INTAKE
Fair (50-75%) Pt reports improved fair appetite and PO intake in house. Noted 51-75% x1 on (07/04) as per flow sheets. Pt agreed to try Glucerna. Denies difficulty chewing/swallowing. Pt denies nausea, vomiting, diarrhea, or constipation, reports last BM today (07/05).

## 2022-07-05 NOTE — DIETITIAN INITIAL EVALUATION ADULT - ADD RECOMMEND
1. Will continue to monitor PO intake, weight, labs, skin, GI status, and diet as able. 2. Encourage PO intake and honor food preferences. 3. Provided recommendations to optimize PO and protein intake - made aware RD remains available. 4. Malnutrition notification placed in chart.

## 2022-07-05 NOTE — PROGRESS NOTE ADULT - ATTENDING COMMENTS
As above    Pt seen/examined    Impression:  #1.  Improving RUQ abd pain  #2.  Abnormal LFTs  #3.  Dilated biliary tree on CT.  Unable to tolerate MRCP due to agitation    Recommendations:    #1.  Follow CBC/LFTs  #2.  NPO after MN for EUS/ERCP on 7/6/22

## 2022-07-05 NOTE — DIETITIAN INITIAL EVALUATION ADULT - OTHER INFO
Pt reports ~12 pounds weight loss x 2-3 weeks PTA due to decreased PO intake, from 145 to 133 pounds. Weight as per flow sheets (07/02) 149 pounds -?accuracy. Obtained bed scale weight (07/05) 132.6 pounds.     Provided recommendations to optimize PO and protein intake, recommended nutrient-dense snacks, non-perishable food, or nutritional supplement between meals and to start with protein; reviewed foods with protein, nutrient-dense snacks, and menu order procedures in hospital.     Pt denies having further questions/concerns about diet and nutrition - suggested to speak to provider or MD about his BG. Pt made aware RD remains available.

## 2022-07-06 LAB
ALBUMIN SERPL ELPH-MCNC: 3.7 G/DL — SIGNIFICANT CHANGE UP (ref 3.3–5)
ALP SERPL-CCNC: 327 U/L — HIGH (ref 40–120)
ALT FLD-CCNC: 178 U/L — HIGH (ref 10–45)
ANION GAP SERPL CALC-SCNC: 8 MMOL/L — SIGNIFICANT CHANGE UP (ref 5–17)
APTT BLD: 30.4 SEC — SIGNIFICANT CHANGE UP (ref 27.5–35.5)
AST SERPL-CCNC: 60 U/L — HIGH (ref 10–40)
BILIRUB SERPL-MCNC: 0.9 MG/DL — SIGNIFICANT CHANGE UP (ref 0.2–1.2)
BLD GP AB SCN SERPL QL: NEGATIVE — SIGNIFICANT CHANGE UP
BUN SERPL-MCNC: 18 MG/DL — SIGNIFICANT CHANGE UP (ref 7–23)
CALCIUM SERPL-MCNC: 8.9 MG/DL — SIGNIFICANT CHANGE UP (ref 8.4–10.5)
CHLORIDE SERPL-SCNC: 102 MMOL/L — SIGNIFICANT CHANGE UP (ref 96–108)
CO2 SERPL-SCNC: 30 MMOL/L — SIGNIFICANT CHANGE UP (ref 22–31)
CREAT SERPL-MCNC: 1.39 MG/DL — HIGH (ref 0.5–1.3)
EGFR: 55 ML/MIN/1.73M2 — LOW
GLUCOSE BLDC GLUCOMTR-MCNC: 111 MG/DL — HIGH (ref 70–99)
GLUCOSE BLDC GLUCOMTR-MCNC: 135 MG/DL — HIGH (ref 70–99)
GLUCOSE BLDC GLUCOMTR-MCNC: 93 MG/DL — SIGNIFICANT CHANGE UP (ref 70–99)
GLUCOSE BLDC GLUCOMTR-MCNC: 94 MG/DL — SIGNIFICANT CHANGE UP (ref 70–99)
GLUCOSE SERPL-MCNC: 128 MG/DL — HIGH (ref 70–99)
HCT VFR BLD CALC: 42.3 % — SIGNIFICANT CHANGE UP (ref 39–50)
HGB BLD-MCNC: 13.4 G/DL — SIGNIFICANT CHANGE UP (ref 13–17)
INR BLD: 1.09 RATIO — SIGNIFICANT CHANGE UP (ref 0.88–1.16)
MCHC RBC-ENTMCNC: 28.2 PG — SIGNIFICANT CHANGE UP (ref 27–34)
MCHC RBC-ENTMCNC: 31.7 GM/DL — LOW (ref 32–36)
MCV RBC AUTO: 89.1 FL — SIGNIFICANT CHANGE UP (ref 80–100)
NRBC # BLD: 0 /100 WBCS — SIGNIFICANT CHANGE UP (ref 0–0)
PLATELET # BLD AUTO: 203 K/UL — SIGNIFICANT CHANGE UP (ref 150–400)
POTASSIUM SERPL-MCNC: 4.1 MMOL/L — SIGNIFICANT CHANGE UP (ref 3.5–5.3)
POTASSIUM SERPL-SCNC: 4.1 MMOL/L — SIGNIFICANT CHANGE UP (ref 3.5–5.3)
PROT SERPL-MCNC: 7.1 G/DL — SIGNIFICANT CHANGE UP (ref 6–8.3)
PROTHROM AB SERPL-ACNC: 12.6 SEC — SIGNIFICANT CHANGE UP (ref 10.5–13.4)
RBC # BLD: 4.75 M/UL — SIGNIFICANT CHANGE UP (ref 4.2–5.8)
RBC # FLD: 15.5 % — HIGH (ref 10.3–14.5)
RH IG SCN BLD-IMP: POSITIVE — SIGNIFICANT CHANGE UP
SODIUM SERPL-SCNC: 140 MMOL/L — SIGNIFICANT CHANGE UP (ref 135–145)
WBC # BLD: 4.24 K/UL — SIGNIFICANT CHANGE UP (ref 3.8–10.5)
WBC # FLD AUTO: 4.24 K/UL — SIGNIFICANT CHANGE UP (ref 3.8–10.5)

## 2022-07-06 PROCEDURE — 74183 MRI ABD W/O CNTR FLWD CNTR: CPT | Mod: 26

## 2022-07-06 PROCEDURE — 99232 SBSQ HOSP IP/OBS MODERATE 35: CPT

## 2022-07-06 PROCEDURE — 99232 SBSQ HOSP IP/OBS MODERATE 35: CPT | Mod: GC

## 2022-07-06 RX ORDER — TAMSULOSIN HYDROCHLORIDE 0.4 MG/1
0.4 CAPSULE ORAL AT BEDTIME
Refills: 0 | Status: DISCONTINUED | OUTPATIENT
Start: 2022-07-06 | End: 2022-07-08

## 2022-07-06 RX ORDER — SODIUM CHLORIDE 9 MG/ML
1000 INJECTION, SOLUTION INTRAVENOUS
Refills: 0 | Status: DISCONTINUED | OUTPATIENT
Start: 2022-07-06 | End: 2022-07-07

## 2022-07-06 RX ADMIN — PIPERACILLIN AND TAZOBACTAM 25 GRAM(S): 4; .5 INJECTION, POWDER, LYOPHILIZED, FOR SOLUTION INTRAVENOUS at 13:14

## 2022-07-06 RX ADMIN — TAMSULOSIN HYDROCHLORIDE 0.4 MILLIGRAM(S): 0.4 CAPSULE ORAL at 21:21

## 2022-07-06 RX ADMIN — PIPERACILLIN AND TAZOBACTAM 25 GRAM(S): 4; .5 INJECTION, POWDER, LYOPHILIZED, FOR SOLUTION INTRAVENOUS at 05:08

## 2022-07-06 RX ADMIN — SODIUM CHLORIDE 75 MILLILITER(S): 9 INJECTION, SOLUTION INTRAVENOUS at 08:37

## 2022-07-06 RX ADMIN — AMLODIPINE BESYLATE 10 MILLIGRAM(S): 2.5 TABLET ORAL at 05:08

## 2022-07-06 RX ADMIN — PIPERACILLIN AND TAZOBACTAM 25 GRAM(S): 4; .5 INJECTION, POWDER, LYOPHILIZED, FOR SOLUTION INTRAVENOUS at 21:21

## 2022-07-06 RX ADMIN — ENOXAPARIN SODIUM 40 MILLIGRAM(S): 100 INJECTION SUBCUTANEOUS at 05:08

## 2022-07-06 RX ADMIN — SODIUM CHLORIDE 75 MILLILITER(S): 9 INJECTION, SOLUTION INTRAVENOUS at 07:30

## 2022-07-06 NOTE — GOALS OF CARE CONVERSATION - ADVANCED CARE PLANNING - CONVERSATION DETAILS
Spoke to patient regarding advance directives, patient kept disconnecting, was able to connect with son to continue conversation. Provided patient health care proxy form. Patient and family will follow up to complete health care proxy with medical team.     Regarding code status, patient is considering what they would prefer with their goals of care.  Patient verbalized understanding that until they express wishes to their medical team, they will remain Full Code. Provided patient video information on advance directives, contact information and will remain available for questions.

## 2022-07-07 ENCOUNTER — RESULT REVIEW (OUTPATIENT)
Age: 67
End: 2022-07-07

## 2022-07-07 DIAGNOSIS — K83.8 OTHER SPECIFIED DISEASES OF BILIARY TRACT: ICD-10-CM

## 2022-07-07 LAB
ALBUMIN SERPL ELPH-MCNC: 3.7 G/DL — SIGNIFICANT CHANGE UP (ref 3.3–5)
ALP SERPL-CCNC: 311 U/L — HIGH (ref 40–120)
ALT FLD-CCNC: 162 U/L — HIGH (ref 10–45)
ANION GAP SERPL CALC-SCNC: 11 MMOL/L — SIGNIFICANT CHANGE UP (ref 5–17)
APTT BLD: 30.6 SEC — SIGNIFICANT CHANGE UP (ref 27.5–35.5)
AST SERPL-CCNC: 65 U/L — HIGH (ref 10–40)
BILIRUB SERPL-MCNC: 0.7 MG/DL — SIGNIFICANT CHANGE UP (ref 0.2–1.2)
BUN SERPL-MCNC: 13 MG/DL — SIGNIFICANT CHANGE UP (ref 7–23)
CALCIUM SERPL-MCNC: 8.7 MG/DL — SIGNIFICANT CHANGE UP (ref 8.4–10.5)
CANCER AG19-9 SERPL-ACNC: <2 U/ML — SIGNIFICANT CHANGE UP
CEA SERPL-MCNC: 2.4 NG/ML — SIGNIFICANT CHANGE UP (ref 0–3.8)
CHLORIDE SERPL-SCNC: 102 MMOL/L — SIGNIFICANT CHANGE UP (ref 96–108)
CO2 SERPL-SCNC: 26 MMOL/L — SIGNIFICANT CHANGE UP (ref 22–31)
CREAT SERPL-MCNC: 1.23 MG/DL — SIGNIFICANT CHANGE UP (ref 0.5–1.3)
EGFR: 64 ML/MIN/1.73M2 — SIGNIFICANT CHANGE UP
GLUCOSE BLDC GLUCOMTR-MCNC: 106 MG/DL — HIGH (ref 70–99)
GLUCOSE BLDC GLUCOMTR-MCNC: 128 MG/DL — HIGH (ref 70–99)
GLUCOSE BLDC GLUCOMTR-MCNC: 341 MG/DL — HIGH (ref 70–99)
GLUCOSE BLDC GLUCOMTR-MCNC: 99 MG/DL — SIGNIFICANT CHANGE UP (ref 70–99)
GLUCOSE SERPL-MCNC: 109 MG/DL — HIGH (ref 70–99)
HCT VFR BLD CALC: 38.5 % — LOW (ref 39–50)
HGB BLD-MCNC: 12.6 G/DL — LOW (ref 13–17)
INR BLD: 1.1 RATIO — SIGNIFICANT CHANGE UP (ref 0.88–1.16)
MCHC RBC-ENTMCNC: 28.4 PG — SIGNIFICANT CHANGE UP (ref 27–34)
MCHC RBC-ENTMCNC: 32.7 GM/DL — SIGNIFICANT CHANGE UP (ref 32–36)
MCV RBC AUTO: 86.9 FL — SIGNIFICANT CHANGE UP (ref 80–100)
NRBC # BLD: 0 /100 WBCS — SIGNIFICANT CHANGE UP (ref 0–0)
PLATELET # BLD AUTO: 181 K/UL — SIGNIFICANT CHANGE UP (ref 150–400)
POTASSIUM SERPL-MCNC: 3.7 MMOL/L — SIGNIFICANT CHANGE UP (ref 3.5–5.3)
POTASSIUM SERPL-SCNC: 3.7 MMOL/L — SIGNIFICANT CHANGE UP (ref 3.5–5.3)
PROT SERPL-MCNC: 6.9 G/DL — SIGNIFICANT CHANGE UP (ref 6–8.3)
PROTHROM AB SERPL-ACNC: 12.7 SEC — SIGNIFICANT CHANGE UP (ref 10.5–13.4)
RBC # BLD: 4.43 M/UL — SIGNIFICANT CHANGE UP (ref 4.2–5.8)
RBC # FLD: 14.9 % — HIGH (ref 10.3–14.5)
SODIUM SERPL-SCNC: 139 MMOL/L — SIGNIFICANT CHANGE UP (ref 135–145)
WBC # BLD: 4.67 K/UL — SIGNIFICANT CHANGE UP (ref 3.8–10.5)
WBC # FLD AUTO: 4.67 K/UL — SIGNIFICANT CHANGE UP (ref 3.8–10.5)

## 2022-07-07 PROCEDURE — 99232 SBSQ HOSP IP/OBS MODERATE 35: CPT

## 2022-07-07 PROCEDURE — 88104 CYTOPATH FL NONGYN SMEARS: CPT | Mod: 26,59

## 2022-07-07 PROCEDURE — 43274 ERCP DUCT STENT PLACEMENT: CPT | Mod: GC,59

## 2022-07-07 PROCEDURE — 43242 EGD US FINE NEEDLE BX/ASPIR: CPT | Mod: GC

## 2022-07-07 PROCEDURE — 88112 CYTOPATH CELL ENHANCE TECH: CPT | Mod: 26,59

## 2022-07-07 PROCEDURE — 88305 TISSUE EXAM BY PATHOLOGIST: CPT | Mod: 26

## 2022-07-07 PROCEDURE — 88173 CYTOPATH EVAL FNA REPORT: CPT | Mod: 26,59

## 2022-07-07 DEVICE — GWIRE VISIGLIDE ANG TIP 270CM .025: Type: IMPLANTABLE DEVICE | Status: FUNCTIONAL

## 2022-07-07 DEVICE — STENT ADVANIX PANC STR NO BARB 5FR 3CM: Type: IMPLANTABLE DEVICE | Status: FUNCTIONAL

## 2022-07-07 DEVICE — AUTOTOME CANNULATING SPHINCTEROTOME RX 44 20MM: Type: IMPLANTABLE DEVICE | Status: FUNCTIONAL

## 2022-07-07 DEVICE — STENT ADVANIX BILIARY CTR BEND 10FRX7CM: Type: IMPLANTABLE DEVICE | Status: FUNCTIONAL

## 2022-07-07 DEVICE — GWIRE JAGTOME REVOLUTION RX 260CM/0.025IN: Type: IMPLANTABLE DEVICE | Status: FUNCTIONAL

## 2022-07-07 DEVICE — BLLN EXTRACT FUSION QUATRO 8.5 10 12 15MM: Type: IMPLANTABLE DEVICE | Status: FUNCTIONAL

## 2022-07-07 RX ORDER — ACETAMINOPHEN 500 MG
650 TABLET ORAL ONCE
Refills: 0 | Status: COMPLETED | OUTPATIENT
Start: 2022-07-07 | End: 2022-07-07

## 2022-07-07 RX ORDER — INDOMETHACIN 50 MG
100 CAPSULE ORAL ONCE
Refills: 0 | Status: COMPLETED | OUTPATIENT
Start: 2022-07-07 | End: 2022-07-07

## 2022-07-07 RX ORDER — SODIUM CHLORIDE 9 MG/ML
500 INJECTION, SOLUTION INTRAVENOUS
Refills: 0 | Status: COMPLETED | OUTPATIENT
Start: 2022-07-07 | End: 2022-07-07

## 2022-07-07 RX ADMIN — AMLODIPINE BESYLATE 10 MILLIGRAM(S): 2.5 TABLET ORAL at 05:30

## 2022-07-07 RX ADMIN — PIPERACILLIN AND TAZOBACTAM 25 GRAM(S): 4; .5 INJECTION, POWDER, LYOPHILIZED, FOR SOLUTION INTRAVENOUS at 04:10

## 2022-07-07 RX ADMIN — Medication 100 MILLIGRAM(S): at 13:51

## 2022-07-07 RX ADMIN — PIPERACILLIN AND TAZOBACTAM 25 GRAM(S): 4; .5 INJECTION, POWDER, LYOPHILIZED, FOR SOLUTION INTRAVENOUS at 21:06

## 2022-07-07 RX ADMIN — Medication 2: at 22:10

## 2022-07-07 RX ADMIN — PIPERACILLIN AND TAZOBACTAM 25 GRAM(S): 4; .5 INJECTION, POWDER, LYOPHILIZED, FOR SOLUTION INTRAVENOUS at 12:15

## 2022-07-07 RX ADMIN — SODIUM CHLORIDE 75 MILLILITER(S): 9 INJECTION, SOLUTION INTRAVENOUS at 07:01

## 2022-07-07 RX ADMIN — Medication 650 MILLIGRAM(S): at 22:11

## 2022-07-07 RX ADMIN — TAMSULOSIN HYDROCHLORIDE 0.4 MILLIGRAM(S): 0.4 CAPSULE ORAL at 21:10

## 2022-07-07 RX ADMIN — Medication 100 MILLIGRAM(S): at 16:36

## 2022-07-07 RX ADMIN — SODIUM CHLORIDE 30 MILLILITER(S): 9 INJECTION, SOLUTION INTRAVENOUS at 16:25

## 2022-07-07 RX ADMIN — Medication 650 MILLIGRAM(S): at 21:28

## 2022-07-07 NOTE — PRE PROCEDURE NOTE - PRE PROCEDURE EVALUATION
Attending Physician:    Dr. Regalado                        Procedure:   ERCP    Indication for Procedure:   Biliary Stricture  ________________________________________________________  PAST MEDICAL & SURGICAL HISTORY:    Hypertension  BPH (benign prostatic hyperplasia)    S/P hemorrhoidectomy-10 years ago  S/p nephrectomy    ALLERGIES:  No Known Allergies    HOME MEDICATIONS:  Norvasc 10 mg oral tablet: 1 tab(s) orally once a day    AICD/PPM: [ ] yes   [X ] no    PERTINENT LAB DATA:                        12.6   4.67  )-----------( 181      ( 07 Jul 2022 04:03 )             38.5     07-07    139  |  102  |  13  ----------------------------<  109<H>  3.7   |  26  |  1.23    Ca    8.7      07 Jul 2022 04:03  TPro  6.9  /  Alb  3.7  /  TBili  0.7  /  DBili  x   /  AST  65<H>  /  ALT  162<H>  /  AlkPhos  311<H>  07-07  PT/INR - ( 07 Jul 2022 04:03 )   PT: 12.7 sec;   INR: 1.10 ratio    PTT - ( 07 Jul 2022 04:03 )  PTT:30.6 sec    PHYSICAL EXAMINATION:    Height (cm): 170.2  Weight (kg): 67.993  BMI (kg/m2): 23.5  BSA (m2): 1.79T(C): 36.9  HR: 85  BP: 145/70  RR: 16  SpO2: 97%    Constitutional: NAD    Neck:  No JVD  Respiratory: CTAB/L  Cardiovascular: S1 and S2  Gastrointestinal: BS+, soft, NT/ND  Extremities: No peripheral edema  Neurological: A/O x 4      COMMENTS:    The patient is a suitable candidate for the planned procedure unless box checked [ ]  No, explain:

## 2022-07-08 ENCOUNTER — TRANSCRIPTION ENCOUNTER (OUTPATIENT)
Age: 67
End: 2022-07-08

## 2022-07-08 VITALS
TEMPERATURE: 98 F | DIASTOLIC BLOOD PRESSURE: 78 MMHG | SYSTOLIC BLOOD PRESSURE: 128 MMHG | OXYGEN SATURATION: 97 % | HEART RATE: 79 BPM | RESPIRATION RATE: 18 BRPM

## 2022-07-08 LAB
ALBUMIN SERPL ELPH-MCNC: 3.4 G/DL — SIGNIFICANT CHANGE UP (ref 3.3–5)
ALP SERPL-CCNC: 280 U/L — HIGH (ref 40–120)
ALT FLD-CCNC: 144 U/L — HIGH (ref 10–45)
ANION GAP SERPL CALC-SCNC: 10 MMOL/L — SIGNIFICANT CHANGE UP (ref 5–17)
AST SERPL-CCNC: 57 U/L — HIGH (ref 10–40)
BILIRUB SERPL-MCNC: 0.4 MG/DL — SIGNIFICANT CHANGE UP (ref 0.2–1.2)
BUN SERPL-MCNC: 20 MG/DL — SIGNIFICANT CHANGE UP (ref 7–23)
CALCIUM SERPL-MCNC: 8.4 MG/DL — SIGNIFICANT CHANGE UP (ref 8.4–10.5)
CHLORIDE SERPL-SCNC: 100 MMOL/L — SIGNIFICANT CHANGE UP (ref 96–108)
CO2 SERPL-SCNC: 27 MMOL/L — SIGNIFICANT CHANGE UP (ref 22–31)
CREAT SERPL-MCNC: 1.66 MG/DL — HIGH (ref 0.5–1.3)
EGFR: 45 ML/MIN/1.73M2 — LOW
GLUCOSE BLDC GLUCOMTR-MCNC: 168 MG/DL — HIGH (ref 70–99)
GLUCOSE BLDC GLUCOMTR-MCNC: 342 MG/DL — HIGH (ref 70–99)
GLUCOSE SERPL-MCNC: 314 MG/DL — HIGH (ref 70–99)
HCT VFR BLD CALC: 37.2 % — LOW (ref 39–50)
HGB BLD-MCNC: 11.8 G/DL — LOW (ref 13–17)
MCHC RBC-ENTMCNC: 28.1 PG — SIGNIFICANT CHANGE UP (ref 27–34)
MCHC RBC-ENTMCNC: 31.7 GM/DL — LOW (ref 32–36)
MCV RBC AUTO: 88.6 FL — SIGNIFICANT CHANGE UP (ref 80–100)
NRBC # BLD: 0 /100 WBCS — SIGNIFICANT CHANGE UP (ref 0–0)
PLATELET # BLD AUTO: 194 K/UL — SIGNIFICANT CHANGE UP (ref 150–400)
POTASSIUM SERPL-MCNC: 4.4 MMOL/L — SIGNIFICANT CHANGE UP (ref 3.5–5.3)
POTASSIUM SERPL-SCNC: 4.4 MMOL/L — SIGNIFICANT CHANGE UP (ref 3.5–5.3)
PROT SERPL-MCNC: 6.5 G/DL — SIGNIFICANT CHANGE UP (ref 6–8.3)
RBC # BLD: 4.2 M/UL — SIGNIFICANT CHANGE UP (ref 4.2–5.8)
RBC # FLD: 14.8 % — HIGH (ref 10.3–14.5)
SODIUM SERPL-SCNC: 137 MMOL/L — SIGNIFICANT CHANGE UP (ref 135–145)
WBC # BLD: 6.19 K/UL — SIGNIFICANT CHANGE UP (ref 3.8–10.5)
WBC # FLD AUTO: 6.19 K/UL — SIGNIFICANT CHANGE UP (ref 3.8–10.5)

## 2022-07-08 PROCEDURE — 82378 CARCINOEMBRYONIC ANTIGEN: CPT

## 2022-07-08 PROCEDURE — 80061 LIPID PANEL: CPT

## 2022-07-08 PROCEDURE — 80053 COMPREHEN METABOLIC PANEL: CPT

## 2022-07-08 PROCEDURE — C1769: CPT

## 2022-07-08 PROCEDURE — 83690 ASSAY OF LIPASE: CPT

## 2022-07-08 PROCEDURE — 85025 COMPLETE CBC W/AUTO DIFF WBC: CPT

## 2022-07-08 PROCEDURE — G1004: CPT

## 2022-07-08 PROCEDURE — 70496 CT ANGIOGRAPHY HEAD: CPT | Mod: MG

## 2022-07-08 PROCEDURE — U0005: CPT

## 2022-07-08 PROCEDURE — 88173 CYTOPATH EVAL FNA REPORT: CPT

## 2022-07-08 PROCEDURE — 70450 CT HEAD/BRAIN W/O DYE: CPT | Mod: MG

## 2022-07-08 PROCEDURE — 36415 COLL VENOUS BLD VENIPUNCTURE: CPT

## 2022-07-08 PROCEDURE — 84443 ASSAY THYROID STIM HORMONE: CPT

## 2022-07-08 PROCEDURE — 85027 COMPLETE CBC AUTOMATED: CPT

## 2022-07-08 PROCEDURE — 83615 LACTATE (LD) (LDH) ENZYME: CPT

## 2022-07-08 PROCEDURE — 83880 ASSAY OF NATRIURETIC PEPTIDE: CPT

## 2022-07-08 PROCEDURE — 88172 CYTP DX EVAL FNA 1ST EA SITE: CPT

## 2022-07-08 PROCEDURE — 83036 HEMOGLOBIN GLYCOSYLATED A1C: CPT

## 2022-07-08 PROCEDURE — 86301 IMMUNOASSAY TUMOR CA 19-9: CPT

## 2022-07-08 PROCEDURE — 82550 ASSAY OF CK (CPK): CPT

## 2022-07-08 PROCEDURE — C2625: CPT

## 2022-07-08 PROCEDURE — 87389 HIV-1 AG W/HIV-1&-2 AB AG IA: CPT

## 2022-07-08 PROCEDURE — 93005 ELECTROCARDIOGRAM TRACING: CPT

## 2022-07-08 PROCEDURE — 85610 PROTHROMBIN TIME: CPT

## 2022-07-08 PROCEDURE — 88112 CYTOPATH CELL ENHANCE TECH: CPT

## 2022-07-08 PROCEDURE — 82977 ASSAY OF GGT: CPT

## 2022-07-08 PROCEDURE — 99285 EMERGENCY DEPT VISIT HI MDM: CPT | Mod: 25

## 2022-07-08 PROCEDURE — 83735 ASSAY OF MAGNESIUM: CPT

## 2022-07-08 PROCEDURE — A9585: CPT

## 2022-07-08 PROCEDURE — 80074 ACUTE HEPATITIS PANEL: CPT

## 2022-07-08 PROCEDURE — 88305 TISSUE EXAM BY PATHOLOGIST: CPT

## 2022-07-08 PROCEDURE — 74183 MRI ABD W/O CNTR FLWD CNTR: CPT

## 2022-07-08 PROCEDURE — 84100 ASSAY OF PHOSPHORUS: CPT

## 2022-07-08 PROCEDURE — 74330 X-RAY BILE/PANC ENDOSCOPY: CPT

## 2022-07-08 PROCEDURE — 87040 BLOOD CULTURE FOR BACTERIA: CPT

## 2022-07-08 PROCEDURE — 82962 GLUCOSE BLOOD TEST: CPT

## 2022-07-08 PROCEDURE — 70498 CT ANGIOGRAPHY NECK: CPT | Mod: MG

## 2022-07-08 PROCEDURE — 76700 US EXAM ABDOM COMPLETE: CPT

## 2022-07-08 PROCEDURE — C9399: CPT

## 2022-07-08 PROCEDURE — 86850 RBC ANTIBODY SCREEN: CPT

## 2022-07-08 PROCEDURE — 86704 HEP B CORE ANTIBODY TOTAL: CPT

## 2022-07-08 PROCEDURE — 99232 SBSQ HOSP IP/OBS MODERATE 35: CPT | Mod: GC

## 2022-07-08 PROCEDURE — 86901 BLOOD TYPING SEROLOGIC RH(D): CPT

## 2022-07-08 PROCEDURE — 82553 CREATINE MB FRACTION: CPT

## 2022-07-08 PROCEDURE — 86900 BLOOD TYPING SEROLOGIC ABO: CPT

## 2022-07-08 PROCEDURE — 86706 HEP B SURFACE ANTIBODY: CPT

## 2022-07-08 PROCEDURE — 85730 THROMBOPLASTIN TIME PARTIAL: CPT

## 2022-07-08 PROCEDURE — 82787 IGG 1 2 3 OR 4 EACH: CPT

## 2022-07-08 PROCEDURE — U0003: CPT

## 2022-07-08 PROCEDURE — 99232 SBSQ HOSP IP/OBS MODERATE 35: CPT

## 2022-07-08 PROCEDURE — 93306 TTE W/DOPPLER COMPLETE: CPT

## 2022-07-08 PROCEDURE — 84484 ASSAY OF TROPONIN QUANT: CPT

## 2022-07-08 PROCEDURE — 81001 URINALYSIS AUTO W/SCOPE: CPT

## 2022-07-08 PROCEDURE — 88104 CYTOPATH FL NONGYN SMEARS: CPT

## 2022-07-08 PROCEDURE — 86803 HEPATITIS C AB TEST: CPT

## 2022-07-08 RX ORDER — TAMSULOSIN HYDROCHLORIDE 0.4 MG/1
1 CAPSULE ORAL
Qty: 30 | Refills: 0
Start: 2022-07-08

## 2022-07-08 RX ORDER — SODIUM CHLORIDE 9 MG/ML
1000 INJECTION, SOLUTION INTRAVENOUS
Refills: 0 | Status: DISCONTINUED | OUTPATIENT
Start: 2022-07-08 | End: 2022-07-08

## 2022-07-08 RX ORDER — ISOPROPYL ALCOHOL, BENZOCAINE .7; .06 ML/ML; ML/ML
1 SWAB TOPICAL
Qty: 100 | Refills: 1
Start: 2022-07-08 | End: 2022-08-26

## 2022-07-08 RX ORDER — EMPAGLIFLOZIN 10 MG/1
10 TABLET, FILM COATED ORAL
Qty: 30 | Refills: 0
Start: 2022-07-08

## 2022-07-08 RX ORDER — AMLODIPINE BESYLATE 2.5 MG/1
1 TABLET ORAL
Qty: 30 | Refills: 0
Start: 2022-07-08

## 2022-07-08 RX ADMIN — SODIUM CHLORIDE 80 MILLILITER(S): 9 INJECTION, SOLUTION INTRAVENOUS at 08:43

## 2022-07-08 RX ADMIN — Medication 30 MILLILITER(S): at 05:14

## 2022-07-08 RX ADMIN — AMLODIPINE BESYLATE 10 MILLIGRAM(S): 2.5 TABLET ORAL at 05:12

## 2022-07-08 RX ADMIN — Medication 1: at 08:54

## 2022-07-08 RX ADMIN — Medication 4: at 12:50

## 2022-07-08 RX ADMIN — PIPERACILLIN AND TAZOBACTAM 25 GRAM(S): 4; .5 INJECTION, POWDER, LYOPHILIZED, FOR SOLUTION INTRAVENOUS at 05:12

## 2022-07-08 NOTE — DISCHARGE NOTE PROVIDER - HOSPITAL COURSE
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 and stable for discharge. Pt educated on the post discharge meds, follow up and procedure care. He is to make an jodee't to f/u with his  PCP within 1 week follow up with GI in 2 weeks and to have abdominal xray. Also to have ERCP done in 2 months. Pt this admission found to have new onset DM for which Jardiance was initiated. Pt verbalized an understanding of and agreed with discharge plans.    ICU Vital Signs Last 24 Hrs  T(C): 37.1 (08 Jul 2022 12:38), Max: 37.1 (08 Jul 2022 12:38)  T(F): 98.7 (08 Jul 2022 12:38), Max: 98.7 (08 Jul 2022 12:38)  HR: 96 (08 Jul 2022 12:38) (73 - 96)  BP: 132/81 (08 Jul 2022 12:38) (116/63 - 143/78)  RR: 18 (08 Jul 2022 12:38) (14 - 20)  SpO2: 92% (08 Jul 2022 12:38) (92% - 99%)    O2 Parameters below as of 08 Jul 2022 12:38  Patient On (Oxygen Delivery Method): room air

## 2022-07-08 NOTE — DISCHARGE NOTE NURSING/CASE MANAGEMENT/SOCIAL WORK - PATIENT PORTAL LINK FT
You can access the FollowMyHealth Patient Portal offered by [facility] by registering at the following website: [email or web address]. By joining [facility]’s FollowMyHealth portal, you will also be able to view your health information using other applications (apps) compatible with our system.

## 2022-07-08 NOTE — PROGRESS NOTE ADULT - PROBLEM SELECTOR PROBLEM 3
Type 2 diabetes mellitus
Hypertension
Type 2 diabetes mellitus

## 2022-07-08 NOTE — DISCHARGE NOTE NURSING/CASE MANAGEMENT/SOCIAL WORK - NSDCPEFALRISK_GEN_ALL_CORE
For information on Fall & Injury Prevention, visit: [email or web address] OR  [email or web address] OR  https://www.cdc.gov/steadi/patient.html

## 2022-07-08 NOTE — PROGRESS NOTE ADULT - PROBLEM SELECTOR PLAN 6
Lovenox  DASH/TLC CC diet  full code
Lovenox  DASH/TLC CC diet  full code  pending ercp potential dc in 24-28hrs
Lovenox  DASH/TLC CC diet  pending ERCP
Lovenox  DASH/TLC CC diet  full code    6. discussed plan with ACP Tiff.
Lovenox  DASH/TLC CC diet      dc in 24 hrs

## 2022-07-08 NOTE — PROGRESS NOTE ADULT - PROBLEM SELECTOR PROBLEM 4
Hypertension
Chronic kidney disease, unspecified CKD stage
Hypertension

## 2022-07-08 NOTE — PROGRESS NOTE ADULT - PROBLEM SELECTOR PLAN 3
A1c - 6.8 - c/w DM. -RD eval. - FS at goal  -c/w  VENKAT qac/hs. -UA with some glucosuria and mild proteinuria.   -will on DC and with diet/lifestyle modification.
c/w norvasc 10mg qd
-check A1c - 6.8 - c/w DM. -RD altagracia. -will add VENKAT qac/hs. -UA with some glucosuria and mild proteinuria.  -consider oral agent for DM on DC and with diet/lifestyle modification.  -Lipid panel in am.
A1c - 6.8 - c/w DM. -RD eval. - FS at goal  -c/w  VENKAT qac/hs. -UA with some glucosuria and mild proteinuria.   -will on DC and with diet/lifestyle modification.
A1c - 6.8 - c/w DM. -RD eval. -c/w  VENKAT qac/hs. -UA with some glucosuria and mild proteinuria.  -consider oral agent for DM on DC and with diet/lifestyle modification.  -Lipid panel f/u
A1c - 6.8 - c/w DM. -RD eval. - FS at goal  -c/w  VENKAT qac/hs. -UA with some glucosuria and mild proteinuria.   -will on DC and with diet/lifestyle modification.

## 2022-07-08 NOTE — DISCHARGE NOTE PROVIDER - DETAILS OF MALNUTRITION DIAGNOSIS/DIAGNOSES
This patient has been assessed with a concern for Malnutrition and was treated during this hospitalization for the following Nutrition diagnosis/diagnoses:     -  07/05/2022: Severe protein-calorie malnutrition

## 2022-07-08 NOTE — PROGRESS NOTE ADULT - PROVIDER SPECIALTY LIST ADULT
Hospitalist
Hospitalist
Gastroenterology
Hospitalist

## 2022-07-08 NOTE — PROGRESS NOTE ADULT - PROBLEM SELECTOR PLAN 5
ARGENIS on CKD stage III likely due to dehydration, improving with IVF so likely pre renal, c/w IVF  monitor crt closely  avoid nephrotoxic agents  SCr 1.62 in May 2022.. Likely ckd in the setting of hypertension. Patient is s/p nephrectomy.   -Renally dose medications
SCr 1.62 in May 2022. Now SCr1.52. Likely ckd in the setting of hypertension. Patient is s/p nephrectomy.   -Renally dose medications
Lovenox  DASH/TLC diet  full code    6. discussed plan with SEFERINO Aguilar.
ARGENIS on CKD stage III likely due to dehydration, improved and back to baseline now increased again  monitor crt closely  avoid nephrotoxic agents  SCr 1.62 in May 2022.. Likely ckd in the setting of hypertension. Patient is s/p nephrectomy.   -Renally dose medications
ARGENIS on CKD stage III likely due to dehydration, improved and back to baseline  monitor crt closely  avoid nephrotoxic agents  SCr 1.62 in May 2022.. Likely ckd in the setting of hypertension. Patient is s/p nephrectomy.   -Renally dose medications
ARGENIS on CKD stage III likely due to dehydration, start IVf x 24hrs  monitor crt closely  avoid nephrotoxic agents  SCr 1.62 in May 2022. Now SCr1.52. Likely ckd in the setting of hypertension. Patient is s/p nephrectomy.   -Renally dose medications

## 2022-07-08 NOTE — PROGRESS NOTE ADULT - SUBJECTIVE AND OBJECTIVE BOX
Interval Events:   No acute events overnight.  Patient afebrile and liver chemistries improving.    ROS:   12 point review of systems performed and negative except otherwise noted in HPI.    Hospital Medications:  aluminum hydroxide/magnesium hydroxide/simethicone Suspension 30 milliLiter(s) Oral every 6 hours PRN  amLODIPine   Tablet 10 milliGRAM(s) Oral daily  enoxaparin Injectable 40 milliGRAM(s) SubCutaneous every 24 hours  LORazepam     Tablet 1 milliGRAM(s) Oral once PRN  ondansetron Injectable 4 milliGRAM(s) IV Push every 6 hours PRN  piperacillin/tazobactam IVPB.. 3.375 Gram(s) IV Intermittent every 8 hours      PHYSICAL EXAM:   Vital Signs:  Vital Signs Last 24 Hrs  T(C): 36.7 (04 Jul 2022 11:28), Max: 36.7 (04 Jul 2022 11:28)  T(F): 98.1 (04 Jul 2022 11:28), Max: 98.1 (04 Jul 2022 11:28)  HR: 65 (04 Jul 2022 05:54) (65 - 72)  BP: 147/76 (04 Jul 2022 05:54) (134/72 - 147/76)  BP(mean): --  RR: 18 (04 Jul 2022 11:28) (18 - 18)  SpO2: 99% (04 Jul 2022 11:28) (98% - 99%)  Daily     Daily     GENERAL: no acute distress  NEURO: alert  HEENT: NCAT, no conjunctival pallor appreciated  CHEST: no respiratory distress, no accessory muscle use  CARDIAC: regular rate, +S1/S2  ABDOMEN: soft, nondistended, nontender, no rebound or guarding  EXTREMITIES: warm, well perfused  SKIN: no lesions noted    LABS: reviewed                        13.9   4.86  )-----------( 189      ( 04 Jul 2022 09:55 )             41.9     07-04    139  |  103  |  21  ----------------------------<  162<H>  4.0   |  26  |  1.05    Ca    8.9      04 Jul 2022 09:53  Phos  3.2     07-03  Mg     2.2     07-03    TPro  7.4  /  Alb  3.8  /  TBili  1.1  /  DBili  x   /  AST  69<H>  /  ALT  264<H>  /  AlkPhos  414<H>  07-04    LIVER FUNCTIONS - ( 04 Jul 2022 09:53 )  Alb: 3.8 g/dL / Pro: 7.4 g/dL / ALK PHOS: 414 U/L / ALT: 264 U/L / AST: 69 U/L / GGT: x             Interval Diagnostic Studies: see sunrise for full report  
Patient is a 66y old  Male who presents with a chief complaint of Transaminitis (06 Jul 2022 11:00)      SUBJECTIVE / OVERNIGHT EVENTS:    feels OK. no abdominal pain, nausea or vomiting.     ROS:  14 point ROS negative in detail except stated as above    MEDICATIONS  (STANDING):  amLODIPine   Tablet 10 milliGRAM(s) Oral daily  dextrose 5%. 1000 milliLiter(s) (50 mL/Hr) IV Continuous <Continuous>  dextrose 5%. 1000 milliLiter(s) (100 mL/Hr) IV Continuous <Continuous>  dextrose 50% Injectable 25 Gram(s) IV Push once  dextrose 50% Injectable 12.5 Gram(s) IV Push once  dextrose 50% Injectable 25 Gram(s) IV Push once  glucagon  Injectable 1 milliGRAM(s) IntraMuscular once  insulin lispro (ADMELOG) corrective regimen sliding scale   SubCutaneous three times a day before meals  insulin lispro (ADMELOG) corrective regimen sliding scale   SubCutaneous at bedtime  lactated ringers. 1000 milliLiter(s) (100 mL/Hr) IV Continuous <Continuous>  piperacillin/tazobactam IVPB.. 3.375 Gram(s) IV Intermittent every 8 hours  tamsulosin 0.4 milliGRAM(s) Oral at bedtime    MEDICATIONS  (PRN):  aluminum hydroxide/magnesium hydroxide/simethicone Suspension 30 milliLiter(s) Oral every 6 hours PRN Dyspepsia  dextrose Oral Gel 15 Gram(s) Oral once PRN Blood Glucose LESS THAN 70 milliGRAM(s)/deciliter  ondansetron Injectable 4 milliGRAM(s) IV Push every 6 hours PRN Nausea and/or Vomiting      CAPILLARY BLOOD GLUCOSE      POCT Blood Glucose.: 106 mg/dL (07 Jul 2022 08:30)  POCT Blood Glucose.: 135 mg/dL (06 Jul 2022 22:02)  POCT Blood Glucose.: 94 mg/dL (06 Jul 2022 17:29)  POCT Blood Glucose.: 93 mg/dL (06 Jul 2022 13:13)    I&O's Summary    06 Jul 2022 07:01  -  07 Jul 2022 07:00  --------------------------------------------------------  IN: 240 mL / OUT: 0 mL / NET: 240 mL        PHYSICAL EXAM:  Vital Signs Last 24 Hrs  T(C): 36.6 (07 Jul 2022 04:05), Max: 36.6 (07 Jul 2022 04:05)  T(F): 97.8 (07 Jul 2022 04:05), Max: 97.8 (07 Jul 2022 04:05)  HR: 74 (07 Jul 2022 04:05) (61 - 74)  BP: 142/80 (07 Jul 2022 04:05) (129/82 - 142/80)  BP(mean): --  RR: 18 (07 Jul 2022 04:05) (17 - 18)  SpO2: 98% (07 Jul 2022 04:05) (98% - 100%)      PHYSICAL EXAM:   GENERAL: NAD, well-developed   EYES: EOMI, PERRL, conjunctiva and sclera clear  NECK: Supple, No JVD  CHEST/LUNG: Clear to auscultation bilaterally; No wheezes, rales or rhonci  HEART: S1S2 normal. Regular rate and rhythm; No murmurs, rubs, or gallops  ABDOMEN: Soft, Nontender, Nondistended; Bowel sounds present +  EXTREMITIES:  2+ Peripheral Pulses, No clubbing, cyanosis, or edema  PSYCH/Neuro: AAOx3. Non-focal.   SKIN: No rashes or lesions        LABS:                        12.6   4.67  )-----------( 181      ( 07 Jul 2022 04:03 )             38.5     07-07    139  |  102  |  13  ----------------------------<  109<H>  3.7   |  26  |  1.23    Ca    8.7      07 Jul 2022 04:03    TPro  6.9  /  Alb  3.7  /  TBili  0.7  /  DBili  x   /  AST  65<H>  /  ALT  162<H>  /  AlkPhos  311<H>  07-07    PT/INR - ( 07 Jul 2022 04:03 )   PT: 12.7 sec;   INR: 1.10 ratio         PTT - ( 07 Jul 2022 04:03 )  PTT:30.6 sec          RADIOLOGY & ADDITIONAL TESTS:    Imaging Personally Reviewed:    < from: MR MRCP w/wo IV Cont (07.06.22 @ 10:07) >    IMPRESSION:  Motion limited study.    A 2.6 cm mass encasing the mid CBD resulting in a 1.1 cm long stricture   and upstream biliary ductal dilatation, suspicious for malignancy.   Additionally, question intrahepatic biliary ductal   irregularity/strictures versus artifact. Consider primary biliary   malignancy or extrinsic compression by abnormal lymphadenopathy.   Differential also includes inflammatory conditions such as IgG4   sclerosing cholangitis. Recommend EUS/ERCP.    < end of copied text >      Consultant(s) Notes Reviewed:      Care Discussed with Consultants/Other Providers:  
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 1000 milliLiter(s) IV Continuous <Continuous>  dextrose 5%. 1000 milliLiter(s) IV Continuous <Continuous>  dextrose 50% Injectable 25 Gram(s) IV Push once  dextrose 50% Injectable 12.5 Gram(s) IV Push once  dextrose 50% Injectable 25 Gram(s) IV Push once  dextrose Oral Gel 15 Gram(s) Oral once PRN  glucagon  Injectable 1 milliGRAM(s) IntraMuscular once  insulin lispro (ADMELOG) corrective regimen sliding scale   SubCutaneous three times a day before meals  insulin lispro (ADMELOG) corrective regimen sliding scale   SubCutaneous at bedtime  lactated ringers. 1000 milliLiter(s) IV Continuous <Continuous>  lactated ringers. 1000 milliLiter(s) IV Continuous <Continuous>  ondansetron Injectable 4 milliGRAM(s) IV Push every 6 hours PRN  piperacillin/tazobactam IVPB.. 3.375 Gram(s) IV Intermittent every 8 hours  tamsulosin 0.4 milliGRAM(s) Oral at bedtime      PMHX/PSHX:  Hypertension    BPH (benign prostatic hyperplasia)    S/P hemorrhoidectomy    S/p nephrectomy        Family history:  No pertinent family history in first degree relatives        ROS: As per HPI, otherwise 14-point ROS reviewed and negative.      PHYSICAL EXAM:   Vital Signs:  Vital Signs Last 24 Hrs  T(C): 36.7 (08 Jul 2022 04:32), Max: 36.9 (07 Jul 2022 13:00)  T(F): 98 (08 Jul 2022 04:32), Max: 98.5 (07 Jul 2022 13:00)  HR: 75 (08 Jul 2022 04:32) (73 - 85)  BP: 116/63 (08 Jul 2022 04:32) (116/63 - 148/74)  BP(mean): --  RR: 18 (08 Jul 2022 04:32) (14 - 20)  SpO2: 97% (08 Jul 2022 04:32) (95% - 99%)    Parameters below as of 08 Jul 2022 04:32  Patient On (Oxygen Delivery Method): room air      Daily Height in cm: 170.18 (07 Jul 2022 13:25)    Daily       07-07-22 @ 07:01  -  07-08-22 @ 07:00  --------------------------------------------------------  IN: 240 mL / OUT: 0 mL / NET: 240 mL        GENERAL: no acute distress  NEURO: alert  HEENT: NCAT, no conjunctival pallor appreciated  CHEST: no respiratory distress, no accessory muscle use  CARDIAC: regular rate, +S1/S2  ABDOMEN: soft, nondistended, nontender, no rebound or guarding  EXTREMITIES: warm, well perfused  SKIN: no lesions noted    LABS:                        11.8   6.19  )-----------( 194      ( 08 Jul 2022 06:55 )             37.2     Mean Cell Volume: 88.6 fl (07-08-22 @ 06:55)    07-08    137  |  100  |  20  ----------------------------<  314<H>  4.4   |  27  |  1.66<H>    Ca    8.4      08 Jul 2022 06:54    TPro  6.5  /  Alb  3.4  /  TBili  0.4  /  DBili  x   /  AST  57<H>  /  ALT  144<H>  /  AlkPhos  280<H>  07-08    LIVER FUNCTIONS - ( 08 Jul 2022 06:54 )  Alb: 3.4 g/dL / Pro: 6.5 g/dL / ALK PHOS: 280 U/L / ALT: 144 U/L / AST: 57 U/L / GGT: x           PT/INR - ( 07 Jul 2022 04:03 )   PT: 12.7 sec;   INR: 1.10 ratio         PTT - ( 07 Jul 2022 04:03 )  PTT:30.6 sec                            11.8   6.19  )-----------( 194      ( 08 Jul 2022 06:55 )             37.2                         12.6   4.67  )-----------( 181      ( 07 Jul 2022 04:03 )             38.5                         13.4   4.24  )-----------( 203      ( 06 Jul 2022 03:10 )             42.3       Imaging:          
Patient is a 66y old  Male who presents with a chief complaint of Transaminitis (2022 15:08)        SUBJECTIVE / OVERNIGHT EVENTS: patient says feels ok today. a little lightheaded sometimes. no abd pain or n/v.      MEDICATIONS  (STANDING):  amLODIPine   Tablet 10 milliGRAM(s) Oral daily  dextrose 5%. 1000 milliLiter(s) (50 mL/Hr) IV Continuous <Continuous>  dextrose 5%. 1000 milliLiter(s) (100 mL/Hr) IV Continuous <Continuous>  dextrose 50% Injectable 25 Gram(s) IV Push once  dextrose 50% Injectable 12.5 Gram(s) IV Push once  dextrose 50% Injectable 25 Gram(s) IV Push once  enoxaparin Injectable 40 milliGRAM(s) SubCutaneous every 24 hours  glucagon  Injectable 1 milliGRAM(s) IntraMuscular once  insulin lispro (ADMELOG) corrective regimen sliding scale   SubCutaneous three times a day before meals  insulin lispro (ADMELOG) corrective regimen sliding scale   SubCutaneous at bedtime  lactated ringers. 1000 milliLiter(s) (100 mL/Hr) IV Continuous <Continuous>  piperacillin/tazobactam IVPB.. 3.375 Gram(s) IV Intermittent every 8 hours    MEDICATIONS  (PRN):  aluminum hydroxide/magnesium hydroxide/simethicone Suspension 30 milliLiter(s) Oral every 6 hours PRN Dyspepsia  dextrose Oral Gel 15 Gram(s) Oral once PRN Blood Glucose LESS THAN 70 milliGRAM(s)/deciliter  ondansetron Injectable 4 milliGRAM(s) IV Push every 6 hours PRN Nausea and/or Vomiting      Vital Signs Last 24 Hrs  T(C): 36.7 (2022 11:28), Max: 36.7 (2022 11:28)  T(F): 98.1 (2022 11:28), Max: 98.1 (2022 11:28)  HR: 65 (2022 05:54) (65 - 66)  BP: 147/76 (2022 05:54) (134/72 - 147/76)  BP(mean): --  RR: 18 (2022 11:28) (18 - 18)  SpO2: 99% (2022 11:28) (98% - 99%)  CAPILLARY BLOOD GLUCOSE        I&O's Summary    2022 07:01  -  2022 07:00  --------------------------------------------------------  IN: 50 mL / OUT: 0 mL / NET: 50 mL    2022 07:01  -  2022 18:26  --------------------------------------------------------  IN: 440 mL / OUT: 0 mL / NET: 440 mL          PHYSICAL EXAM:   GENERAL: NAD, well-developed     EYES: EOMI, PERRLA, conjunctiva and sclera clear  NECK: Supple, No JVD  CHEST/LUNG: Clear to auscultation bilaterally; No wheeze  HEART: S1S2 normal. Regular rate and rhythm; No murmurs, rubs, or gallops  ABDOMEN: Soft, Nontender, Nondistended; Bowel sounds present  EXTREMITIES:  2+ Peripheral Pulses, No clubbing, cyanosis, or edema  PSYCH/Neuro: AAOx3. Non-focal.   SKIN: No rashes or lesions      LABS:                        13.9   4.86  )-----------( 189      ( 2022 09:55 )             41.9     07-04    139  |  103  |  21  ----------------------------<  162<H>  4.0   |  26  |  1.05    Ca    8.9      2022 09:53  Phos  3.2     07-03  Mg     2.2     07-03    TPro  7.4  /  Alb  3.8  /  TBili  1.1  /  DBili  x   /  AST  69<H>  /  ALT  264<H>  /  AlkPhos  414<H>  07-04    PT/INR - ( 2022 19:45 )   PT: 12.0 sec;   INR: 1.04 ratio         PTT - ( 2022 19:45 )  PTT:32.2 sec  CARDIAC MARKERS ( 2022 05:55 )  x     / x     / 177 U/L / x     / 1.8 ng/mL      Urinalysis Basic - ( 2022 20:30 )    Color: Yellow / Appearance: Clear / S.028 / pH: x  Gluc: x / Ketone: Trace  / Bili: Negative / Urobili: 2 mg/dL   Blood: x / Protein: 30 mg/dL / Nitrite: Negative   Leuk Esterase: Negative / RBC: 4 /hpf / WBC 1 /HPF   Sq Epi: x / Non Sq Epi: 1 /hpf / Bacteria: Negative          RADIOLOGY & ADDITIONAL TESTS:    Imaging Personally Reviewed:  Consultant(s) Notes Reviewed:  gi  Care Discussed with Consultants/Other Providers:  
Interval Events:   MRI/MRCP aborted due to agitation.  Afebrile and hemodynamically stable.    Hospital Medications:  aluminum hydroxide/magnesium hydroxide/simethicone Suspension 30 milliLiter(s) Oral every 6 hours PRN  amLODIPine   Tablet 10 milliGRAM(s) Oral daily  dextrose 5%. 1000 milliLiter(s) IV Continuous <Continuous>  dextrose 5%. 1000 milliLiter(s) IV Continuous <Continuous>  dextrose 50% Injectable 25 Gram(s) IV Push once  dextrose 50% Injectable 12.5 Gram(s) IV Push once  dextrose 50% Injectable 25 Gram(s) IV Push once  dextrose Oral Gel 15 Gram(s) Oral once PRN  enoxaparin Injectable 40 milliGRAM(s) SubCutaneous every 24 hours  glucagon  Injectable 1 milliGRAM(s) IntraMuscular once  insulin lispro (ADMELOG) corrective regimen sliding scale   SubCutaneous three times a day before meals  insulin lispro (ADMELOG) corrective regimen sliding scale   SubCutaneous at bedtime  lactated ringers. 1000 milliLiter(s) IV Continuous <Continuous>  ondansetron Injectable 4 milliGRAM(s) IV Push every 6 hours PRN  piperacillin/tazobactam IVPB.. 3.375 Gram(s) IV Intermittent every 8 hours      PHYSICAL EXAM:   Vital Signs:  Vital Signs Last 24 Hrs  T(C): 36.9 (04 Jul 2022 21:03), Max: 36.9 (04 Jul 2022 21:03)  T(F): 98.4 (04 Jul 2022 21:03), Max: 98.4 (04 Jul 2022 21:03)  HR: 66 (04 Jul 2022 21:03) (65 - 66)  BP: 149/79 (04 Jul 2022 21:03) (134/72 - 149/79)  BP(mean): --  RR: 18 (04 Jul 2022 21:03) (18 - 18)  SpO2: 99% (04 Jul 2022 21:03) (98% - 99%)  Daily     Daily     GENERAL: no acute distress  NEURO: alert  HEENT: NCAT, no conjunctival pallor appreciated  CHEST: no respiratory distress, no accessory muscle use  CARDIAC: regular rate, +S1/S2  ABDOMEN: soft, nondistended, nontender, no rebound or guarding  EXTREMITIES: warm, well perfused  SKIN: no lesions noted    LABS: reviewed                        13.9   4.86  )-----------( 189      ( 04 Jul 2022 09:55 )             41.9     07-04    139  |  103  |  21  ----------------------------<  162<H>  4.0   |  26  |  1.05    Ca    8.9      04 Jul 2022 09:53  Phos  3.2     07-03  Mg     2.2     07-03    TPro  7.4  /  Alb  3.8  /  TBili  1.1  /  DBili  x   /  AST  69<H>  /  ALT  264<H>  /  AlkPhos  414<H>  07-04    LIVER FUNCTIONS - ( 04 Jul 2022 09:53 )  Alb: 3.8 g/dL / Pro: 7.4 g/dL / ALK PHOS: 414 U/L / ALT: 264 U/L / AST: 69 U/L / GGT: x             Interval Diagnostic Studies: see sunrise for full report  
Patient is a 66y old  Male who presents with a chief complaint of Transaminitis (03 Jul 2022 13:39)        SUBJECTIVE / OVERNIGHT EVENTS: today patient denies any lightheadedness or dizziness. no n/v or abdominal pain.       MEDICATIONS  (STANDING):  amLODIPine   Tablet 10 milliGRAM(s) Oral daily  enoxaparin Injectable 40 milliGRAM(s) SubCutaneous every 24 hours    MEDICATIONS  (PRN):  aluminum hydroxide/magnesium hydroxide/simethicone Suspension 30 milliLiter(s) Oral every 6 hours PRN Dyspepsia  LORazepam     Tablet 1 milliGRAM(s) Oral once PRN give 30 minutes prior to MRI  ondansetron Injectable 4 milliGRAM(s) IV Push every 6 hours PRN Nausea and/or Vomiting      Vital Signs Last 24 Hrs  T(C): 36.5 (03 Jul 2022 11:50), Max: 36.8 (02 Jul 2022 20:11)  T(F): 97.7 (03 Jul 2022 11:50), Max: 98.2 (02 Jul 2022 20:11)  HR: 72 (03 Jul 2022 11:50) (59 - 84)  BP: 137/75 (03 Jul 2022 11:50) (134/68 - 165/92)  BP(mean): --  RR: 18 (03 Jul 2022 11:50) (17 - 18)  SpO2: 99% (03 Jul 2022 11:50) (99% - 100%)  CAPILLARY BLOOD GLUCOSE        I&O's Summary        PHYSICAL EXAM:   GENERAL: NAD, well-developed  HEAD:  Atraumatic, Normocephalic  EYES: EOMI, PERRLA, conjunctiva and sclera clear  NECK: Supple,    CHEST/LUNG: Clear to auscultation bilaterally; No wheeze  HEART: S1S2 normal. Regular rate and rhythm; No murmurs, rubs, or gallops  ABDOMEN: Soft, Nontender, Nondistended; Bowel sounds present  EXTREMITIES:  2+ Peripheral Pulses, No clubbing, cyanosis, or edema  PSYCH/Neuro: AAOx3. Non-focal.   SKIN: No rashes or lesions      LABS:                        12.8   4.02  )-----------( 178      ( 03 Jul 2022 05:55 )             39.6     07-03    142  |  106  |  21  ----------------------------<  91  4.1   |  27  |  1.27    Ca    8.9      03 Jul 2022 05:55  Phos  3.2     07-03  Mg     2.2     07-03    TPro  7.3  /  Alb  3.8  /  TBili  1.3<H>  /  DBili  x   /  AST  211<H>  /  ALT  406<H>  /  AlkPhos  448<H>  07-03    PT/INR - ( 02 Jul 2022 19:45 )   PT: 12.0 sec;   INR: 1.04 ratio         PTT - ( 02 Jul 2022 19:45 )  PTT:32.2 sec  CARDIAC MARKERS ( 03 Jul 2022 05:55 )  x     / x     / 177 U/L / x     / 1.8 ng/mL        < from: US Abdomen Complete (US Abdomen Complete .) (07.03.22 @ 00:13) >    IMPRESSION:  Mild intrahepatic and extrahepatic bile duct dilatation. The common bile   duct demonstrates mural thickening and narrowing which may be related to   cholangitis. Correlation with MRCP isrecommended. The gallbladder is   filled with sludge.        --- End of Report ---      < end of copied text >      RADIOLOGY & ADDITIONAL TESTS:    Imaging Personally Reviewed: US abd report.   Consultant(s) Notes Reviewed:  GI  Care Discussed with Consultants/Other Providers:  
Patient is a 66y old  Male who presents with a chief complaint of Transaminitis (08 Jul 2022 10:15)      SUBJECTIVE / OVERNIGHT EVENTS:    feels wel. no complaints. no fevers, chills, abdominal pain. eating and drinking well.     ROS:  14 point ROS negative in detail except stated as above    MEDICATIONS  (STANDING):  amLODIPine   Tablet 10 milliGRAM(s) Oral daily  dextrose 5%. 1000 milliLiter(s) (50 mL/Hr) IV Continuous <Continuous>  dextrose 5%. 1000 milliLiter(s) (100 mL/Hr) IV Continuous <Continuous>  dextrose 50% Injectable 25 Gram(s) IV Push once  dextrose 50% Injectable 12.5 Gram(s) IV Push once  dextrose 50% Injectable 25 Gram(s) IV Push once  glucagon  Injectable 1 milliGRAM(s) IntraMuscular once  insulin lispro (ADMELOG) corrective regimen sliding scale   SubCutaneous three times a day before meals  insulin lispro (ADMELOG) corrective regimen sliding scale   SubCutaneous at bedtime  lactated ringers. 1000 milliLiter(s) (100 mL/Hr) IV Continuous <Continuous>  lactated ringers. 1000 milliLiter(s) (80 mL/Hr) IV Continuous <Continuous>  piperacillin/tazobactam IVPB.. 3.375 Gram(s) IV Intermittent every 8 hours  tamsulosin 0.4 milliGRAM(s) Oral at bedtime    MEDICATIONS  (PRN):  aluminum hydroxide/magnesium hydroxide/simethicone Suspension 30 milliLiter(s) Oral every 6 hours PRN Dyspepsia  dextrose Oral Gel 15 Gram(s) Oral once PRN Blood Glucose LESS THAN 70 milliGRAM(s)/deciliter  ondansetron Injectable 4 milliGRAM(s) IV Push every 6 hours PRN Nausea and/or Vomiting      CAPILLARY BLOOD GLUCOSE      POCT Blood Glucose.: 168 mg/dL (08 Jul 2022 08:33)  POCT Blood Glucose.: 341 mg/dL (07 Jul 2022 22:00)  POCT Blood Glucose.: 128 mg/dL (07 Jul 2022 18:06)  POCT Blood Glucose.: 99 mg/dL (07 Jul 2022 12:12)    I&O's Summary    07 Jul 2022 07:01  -  08 Jul 2022 07:00  --------------------------------------------------------  IN: 240 mL / OUT: 0 mL / NET: 240 mL        PHYSICAL EXAM:  Vital Signs Last 24 Hrs  T(C): 36.7 (08 Jul 2022 04:32), Max: 36.9 (07 Jul 2022 13:00)  T(F): 98 (08 Jul 2022 04:32), Max: 98.5 (07 Jul 2022 13:00)  HR: 75 (08 Jul 2022 04:32) (73 - 85)  BP: 116/63 (08 Jul 2022 04:32) (116/63 - 148/74)  BP(mean): --  RR: 18 (08 Jul 2022 04:32) (14 - 20)  SpO2: 97% (08 Jul 2022 04:32) (95% - 99%)    Parameters below as of 08 Jul 2022 04:32  Patient On (Oxygen Delivery Method): room air    PHYSICAL EXAM:   GENERAL: NAD, well-developed   EYES: EOMI, PERRL, conjunctiva and sclera clear  NECK: Supple, No JVD  CHEST/LUNG: Clear to auscultation bilaterally; No wheezes, rales or rhonci  HEART: S1S2 normal. Regular rate and rhythm; No murmurs, rubs, or gallops  ABDOMEN: Soft, Nontender, Nondistended; Bowel sounds present +  EXTREMITIES:  2+ Peripheral Pulses, No clubbing, cyanosis, or edema  PSYCH/Neuro: AAOx3. Non-focal.   SKIN: No rashes or lesions        LABS:                        11.8   6.19  )-----------( 194      ( 08 Jul 2022 06:55 )             37.2     07-08    137  |  100  |  20  ----------------------------<  314<H>  4.4   |  27  |  1.66<H>    Ca    8.4      08 Jul 2022 06:54    TPro  6.5  /  Alb  3.4  /  TBili  0.4  /  DBili  x   /  AST  57<H>  /  ALT  144<H>  /  AlkPhos  280<H>  07-08    PT/INR - ( 07 Jul 2022 04:03 )   PT: 12.7 sec;   INR: 1.10 ratio         PTT - ( 07 Jul 2022 04:03 )  PTT:30.6 sec          RADIOLOGY & ADDITIONAL TESTS:    Imaging Personally Reviewed:    Consultant(s) Notes Reviewed:      Care Discussed with Consultants/Other Providers:  brenton Upton
Patient is a 66y old  Male who presents with a chief complaint of Transaminitis (05 Jul 2022 12:09)      SUBJECTIVE / OVERNIGHT EVENTS:    feels well. no abdominal pain, fevers, chills. no diarrhea.    ROS:  14 point ROS negative in detail except stated as above    MEDICATIONS  (STANDING):  amLODIPine   Tablet 10 milliGRAM(s) Oral daily  dextrose 5%. 1000 milliLiter(s) (50 mL/Hr) IV Continuous <Continuous>  dextrose 5%. 1000 milliLiter(s) (100 mL/Hr) IV Continuous <Continuous>  dextrose 50% Injectable 25 Gram(s) IV Push once  dextrose 50% Injectable 12.5 Gram(s) IV Push once  dextrose 50% Injectable 25 Gram(s) IV Push once  enoxaparin Injectable 40 milliGRAM(s) SubCutaneous every 24 hours  glucagon  Injectable 1 milliGRAM(s) IntraMuscular once  insulin lispro (ADMELOG) corrective regimen sliding scale   SubCutaneous three times a day before meals  insulin lispro (ADMELOG) corrective regimen sliding scale   SubCutaneous at bedtime  lactated ringers. 1000 milliLiter(s) (100 mL/Hr) IV Continuous <Continuous>  lactated ringers. 1000 milliLiter(s) (75 mL/Hr) IV Continuous <Continuous>  piperacillin/tazobactam IVPB.. 3.375 Gram(s) IV Intermittent every 8 hours    MEDICATIONS  (PRN):  aluminum hydroxide/magnesium hydroxide/simethicone Suspension 30 milliLiter(s) Oral every 6 hours PRN Dyspepsia  dextrose Oral Gel 15 Gram(s) Oral once PRN Blood Glucose LESS THAN 70 milliGRAM(s)/deciliter  ondansetron Injectable 4 milliGRAM(s) IV Push every 6 hours PRN Nausea and/or Vomiting      CAPILLARY BLOOD GLUCOSE      POCT Blood Glucose.: 111 mg/dL (06 Jul 2022 08:21)  POCT Blood Glucose.: 154 mg/dL (05 Jul 2022 22:05)  POCT Blood Glucose.: 147 mg/dL (05 Jul 2022 17:38)  POCT Blood Glucose.: 90 mg/dL (05 Jul 2022 12:10)    I&O's Summary    05 Jul 2022 07:01  -  06 Jul 2022 07:00  --------------------------------------------------------  IN: 1365 mL / OUT: 0 mL / NET: 1365 mL        PHYSICAL EXAM:  Vital Signs Last 24 Hrs  T(C): 36.6 (06 Jul 2022 04:12), Max: 36.9 (05 Jul 2022 21:22)  T(F): 97.8 (06 Jul 2022 04:12), Max: 98.5 (05 Jul 2022 21:22)  HR: 73 (06 Jul 2022 04:12) (70 - 73)  BP: 129/75 (06 Jul 2022 04:12) (125/80 - 144/75)  BP(mean): --  RR: 17 (06 Jul 2022 04:12) (17 - 18)  SpO2: 99% (06 Jul 2022 04:12) (98% - 99%)      PHYSICAL EXAM:   GENERAL: NAD, well-developed   EYES: EOMI, PERRL, conjunctiva and sclera clear  NECK: Supple, No JVD  CHEST/LUNG: Clear to auscultation bilaterally; No wheezes, rales or rhonci  HEART: S1S2 normal. Regular rate and rhythm; No murmurs, rubs, or gallops  ABDOMEN: Soft, Nontender, Nondistended; Bowel sounds present +  EXTREMITIES:  2+ Peripheral Pulses, No clubbing, cyanosis, or edema  PSYCH/Neuro: AAOx3. Non-focal.   SKIN: No rashes or lesions    LABS:                        13.4   4.24  )-----------( 203      ( 06 Jul 2022 03:10 )             42.3     07-06    140  |  102  |  18  ----------------------------<  128<H>  4.1   |  30  |  1.39<H>    Ca    8.9      06 Jul 2022 03:10  Phos  2.9     07-05  Mg     2.1     07-05    TPro  7.1  /  Alb  3.7  /  TBili  0.9  /  DBili  x   /  AST  60<H>  /  ALT  178<H>  /  AlkPhos  327<H>  07-06    PT/INR - ( 06 Jul 2022 03:10 )   PT: 12.6 sec;   INR: 1.09 ratio         PTT - ( 06 Jul 2022 03:10 )  PTT:30.4 sec          RADIOLOGY & ADDITIONAL TESTS:    Imaging Personally Reviewed:    1. Calcified trileaflet aortic valve with normal opening.  2. Normal left atrium.  LA volume index = 16 cc/m2.  3. Normal left ventricular internal dimensions and wall  thicknesses.  4. Normal left ventricular systolic function. No segmental  wall motion abnormalities.  5. Normal diastolic function  6. Normal right ventricular size and function.  7. Estimated right ventricular systolic pressure equals 24  mm Hg, assuming right atrial pressure equals 3 mm Hg,  consistent with normal pulmonary pressures.  8. Normal pericardium with trace pericardial effusion.     Care Discussed with Consultants/Other Providers:  brenton NP Mina
Patient is a 66y old  Male who presents with a chief complaint of Transaminitis (2022 03:38)      SUBJECTIVE / OVERNIGHT EVENTS:    feels well. abdominal pain is improved. no fevers, chills, diarrhea    ROS:  14 point ROS negative in detail except stated as above    MEDICATIONS  (STANDING):  amLODIPine   Tablet 10 milliGRAM(s) Oral daily  dextrose 5%. 1000 milliLiter(s) (50 mL/Hr) IV Continuous <Continuous>  dextrose 5%. 1000 milliLiter(s) (100 mL/Hr) IV Continuous <Continuous>  dextrose 50% Injectable 25 Gram(s) IV Push once  dextrose 50% Injectable 12.5 Gram(s) IV Push once  dextrose 50% Injectable 25 Gram(s) IV Push once  enoxaparin Injectable 40 milliGRAM(s) SubCutaneous every 24 hours  glucagon  Injectable 1 milliGRAM(s) IntraMuscular once  insulin lispro (ADMELOG) corrective regimen sliding scale   SubCutaneous three times a day before meals  insulin lispro (ADMELOG) corrective regimen sliding scale   SubCutaneous at bedtime  lactated ringers. 1000 milliLiter(s) (100 mL/Hr) IV Continuous <Continuous>  lactated ringers. 1000 milliLiter(s) (75 mL/Hr) IV Continuous <Continuous>  piperacillin/tazobactam IVPB.. 3.375 Gram(s) IV Intermittent every 8 hours    MEDICATIONS  (PRN):  aluminum hydroxide/magnesium hydroxide/simethicone Suspension 30 milliLiter(s) Oral every 6 hours PRN Dyspepsia  dextrose Oral Gel 15 Gram(s) Oral once PRN Blood Glucose LESS THAN 70 milliGRAM(s)/deciliter  ondansetron Injectable 4 milliGRAM(s) IV Push every 6 hours PRN Nausea and/or Vomiting      CAPILLARY BLOOD GLUCOSE      POCT Blood Glucose.: 104 mg/dL (2022 08:52)  POCT Blood Glucose.: 126 mg/dL (2022 21:40)    I&O's Summary    2022 07:01  -  2022 07:00  --------------------------------------------------------  IN: 1680 mL / OUT: 0 mL / NET: 1680 mL        PHYSICAL EXAM:  Vital Signs Last 24 Hrs  T(C): 36.6 (2022 11:41), Max: 36.9 (2022 21:03)  T(F): 97.8 (2022 11:41), Max: 98.4 (2022 21:03)  HR: 70 (2022 11:41) (66 - 71)  BP: 125/80 (2022 11:41) (125/80 - 149/79)  BP(mean): --  RR: 18 (2022 11:41) (18 - 18)  SpO2: 98% (2022 11:41) (98% - 99%)    PHYSICAL EXAM:   GENERAL: NAD, well-developed   EYES: EOMI, PERRL, conjunctiva and sclera clear  NECK: Supple, No JVD  CHEST/LUNG: Clear to auscultation bilaterally; No wheezes, rales or rhonci  HEART: S1S2 normal. Regular rate and rhythm; No murmurs, rubs, or gallops  ABDOMEN: Soft, Nontender, Nondistended; Bowel sounds present +  EXTREMITIES:  2+ Peripheral Pulses, No clubbing, cyanosis, or edema  PSYCH/Neuro: AAOx3. Non-focal.   SKIN: No rashes or lesions    LABS:                        13.4   4.22  )-----------( 192      ( 2022 07:28 )             42.0     07-05    139  |  101  |  19  ----------------------------<  111<H>  3.7   |  28  |  1.48<H>    Ca    8.8      2022 07:23  Phos  2.9     07-05  Mg     2.1     07-05    TPro  7.1  /  Alb  3.6  /  TBili  0.9  /  DBili  x   /  AST  52<H>  /  ALT  205<H>  /  AlkPhos  348<H>  07-05          Urinalysis Basic - ( 2022 20:30 )    Color: Yellow / Appearance: Clear / S.028 / pH: x  Gluc: x / Ketone: Trace  / Bili: Negative / Urobili: 2 mg/dL   Blood: x / Protein: 30 mg/dL / Nitrite: Negative   Leuk Esterase: Negative / RBC: 4 /hpf / WBC 1 /HPF   Sq Epi: x / Non Sq Epi: 1 /hpf / Bacteria: Negative        RADIOLOGY & ADDITIONAL TESTS:    Imaging Personally Reviewed:    Consultant(s) Notes Reviewed:      Care Discussed with Consultants/Other Providers:  brenton RICHARDS Imna

## 2022-07-08 NOTE — DISCHARGE NOTE PROVIDER - NSDCMRMEDTOKEN_GEN_ALL_CORE_FT
alcohol swabs : Apply topically to affected area 4 times a day   Freestyle Komal 2 [place]: Dispense 1 [place]  Freestyle Komal 2 sensors: Apply 1 sensor every 14 days  Freestyle Precision Edgardo Strips: Test blood sugar four times a day when you see check blood glucose symbol or when symptoms don&#x27;t match Komal reading.  Jardiance 10 mg oral tablet: 10 tab(s) orally once a day   lancets: 1 application subcutaneously 4 times a day   Norvasc 10 mg oral tablet: 1 tab(s) orally once a day  tamsulosin 0.4 mg oral capsule: 1 cap(s) orally once a day (at bedtime)

## 2022-07-08 NOTE — PROGRESS NOTE ADULT - PROBLEM SELECTOR PLAN 1
. . T bili 1.3. Unclear etiology. No tylenol or drug use that could cause DILI. Normal weight so unlikely to be HAMILTON. Denies alcohol or drug use. No recent travel.   DDx choledocholithiasis, hepatitis, malignancy  - abdominal ultrasound - showed Mild intrahepatic and extrahepatic bile duct dilatation. The common bile   duct demonstrates mural thickening and narrowing which may be related to cholangitis.   -MRCP pending.   -GI says no indication for urgent ercp at this time.   -Currently afebrile and without leukocytosis, so will hold off on empiric abx and cultures at this time.   -hepatitis panel, HIV - f/u - thus far negative.   -Trend LFTs  -Avoid hepatotoxic agents
. . T bili 1.3. Unclear etiology. No tylenol or drug use that could cause DILI. Normal weight so unlikely to be HAMILTON. Denies alcohol or drug use. No recent travel.   DDx choledocholithiasis, hepatitis, malignancy  - abdominal ultrasound - showed Mild intrahepatic and extrahepatic bile duct dilatation. The common bile   duct demonstrates mural thickening and narrowing which may be related to cholangitis.   -MRCP pending. -patient unable to tolerate despite ativan premeds. -consider doing with anesthesia if necessary. f/u with GI if ERCP/EUS can be pursued directly instead.   -GI says no indication for urgent ercp at this time. -Keep NPO at MN in case can get ERCP tomorrow.   -Currently afebrile and without leukocytosis, -however GI recs cultures and empiric zosyn and IVF's for now.   -hepatitis panel, HIV - f/u - thus far negative.   -Trend LFTs  -Avoid hepatotoxic agents
MRCP performed for transamintis and elevated all phos showing a 2.6 cm mass encasing the mid CBD resulting in a 1.1 cm long stricture and upstream biliary ductal dilatation, suspicious for malignancy  - CEA, CA 19-9, IgG subets pending  -Currently afebrile and without leukocytosis ,c/w IV zosyn ppx as per GI recs  - follow up ERCP results
MRCP performed for transamintis and elevated all phos showing a 2.6 cm mass encasing the mid CBD resulting in a 1.1 cm long stricture and upstream biliary ductal dilatation, suspicious for malignancy  - CEA, CA 19-9 negative IgG subsets pending  - on empiric Zosyn  - s/p EGD/EUS/ERCP - noted biliary stricture suspected for cholangiocarcinoma (FNA, cytology and biopsy were done), 5x3 PD plastic pigtail stent and a 10x7 CBD plastic stent   -- Abdominal XR in 2 weeks to assess for PD stent location  - Follow up with Dr. Burnett in 4 weeks  - Repeat ERCP in 2 months for CBD stent removal
. . T bili 1.3. Unclear etiology. No Tylenol or drug use that could cause DILI. Normal weight so unlikely to be HAMILTON. Denies alcohol or drug use. No recent travel.   DDx choledocholithiasis, hepatitis, malignancy  - abdominal ultrasound - showed Mild intrahepatic and extrahepatic bile duct dilatation. The common bile   duct demonstrates mural thickening and narrowing which may be related to cholangitis.   -MRCP pending with anesthesia, GI follow up  -GI says no indication for urgent ercp at this time.   -Currently afebrile and without leukocytosis, -however GI recs cultures and empiric zosyn and IVF's for now.   c/w IV zosyn ppx as per GI recs  -hepatitis panel, HIV - f/u - thus far negative.   -Trend LFTs  -Avoid hepatotoxic agents
. . T bili 1.3. Unclear etiology. No Tylenol or drug use that could cause DILI. Normal weight so unlikely to be HAMILTON. Denies alcohol or drug use. No recent travel; however improving could be due to passed stone  - pending ERCP today  - abdominal ultrasound - showed Mild intrahepatic and extrahepatic bile duct dilatation. The common bile   duct demonstrates mural thickening and narrowing which may be related to cholangitis.   -Currently afebrile and without leukocytosis ,c/w IV zosyn ppx as per GI recs  -hepatitis panel, HIV -neg  -Trend LFTs; improving  -Avoid hepatotoxic agents

## 2022-07-08 NOTE — PROGRESS NOTE ADULT - PROBLEM SELECTOR PLAN 2
Chronic, unclear etiology. EKG NSR. CT head and CTA neck wnl. Possibly orthostatic hypotension in the setting of decreased PO intake.   -check orthostatics   -check A1c - 6.8 - c/w DM. -RD eval. -May need to add VENKAT qac/hs. -UA.  -Check TTE  -Check TSH - within normal.
EKG NSR. CT head and CTA neck wnl. orthostatic hypotension in the setting of decreased PO intake.   -TTE normal small pericardial effusion. -BNP normal.  - improved
Chronic, unclear etiology. EKG NSR. CT head and CTA neck wnl. Possibly orthostatic hypotension in the setting of decreased PO intake.   -check orthostatics   -check A1c - 6.8 - c/w DM. -RD eval. -will add VENKAT qac/hs. -UA with some glucosuria and mild proteinuria.  -Check TTE - pending. -BNP normal.   - TSH - within normal.
EKG NSR. CT head and CTA neck wnl. orthostatic hypotension in the setting of decreased PO intake.   -TTE normal small pericardial effusion. -BNP normal.  - improved
EKG NSR. CT head and CTA neck wnl. orthostatic hypotension in the setting of decreased PO intake.   -check A1c - 6.8 - c/w DM. -RD eval.   c/w  VENKAT qac/hs. -UA with some glucosuria and mild proteinuria.  -Check TTE - pending. -BNP normal.
EKG NSR. CT head and CTA neck wnl. orthostatic hypotension in the setting of decreased PO intake.   -TTE normal small pericardial effusion. -BNP normal.

## 2022-07-08 NOTE — DISCHARGE NOTE PROVIDER - PROVIDER TOKENS
PROVIDER:[TOKEN:[[id number]],FOLLOWUP:[2 weeks]],FREE:[LAST:[PCP],FIRST:[PCP],PHONE:[(   )    -],FAX:[(   )    -],ADDRESS:[Follow up with your primary care provider within 1 week of discharge]]

## 2022-07-08 NOTE — PROGRESS NOTE ADULT - PROBLEM SELECTOR PLAN 4
c/w norvasc 10mg daily  continue to monitor  at goal
SCr 1.62 in May 2022. Now SCr1.52. Likely ckd in the setting of hypertension. Patient is s/p nephrectomy.   -Renally dose medications
c/w norvasc 10mg daily  continue to monitor  at goal
c/w norvasc 10mg qd  -consider changing norvasc to ACEi/ARB consider DM diagnosis.
c/w norvasc 10mg daily  continue to monitor  at goal
c/w norvasc 10mg daily

## 2022-07-08 NOTE — PROGRESS NOTE ADULT - PROBLEM SELECTOR PROBLEM 5
Chronic kidney disease, unspecified CKD stage
Need for prophylactic measure
Chronic kidney disease, unspecified CKD stage
Chronic kidney disease, unspecified CKD stage

## 2022-07-08 NOTE — PROGRESS NOTE ADULT - ASSESSMENT
66 year old male with history of HTN, BPH, renal mass s/p nephrectomy who presents with lightheadedness, found to have a biliary mass.    # Biliary mass - suspected cholangiocarcinoma. Pathology pending.     Recommendations:  - No objections to discharge from GI's perspective  - Abdominal XR in 2 weeks to assess for PD stent location  - Follow up with Dr. Burnett in 4 weeks  - Repeat ERCP in 2 months for CBD stent removal  - f/u pathology  - Diet as tolerated    Please note that the recommendations are not final until attested by an attending.    Thank you for involving us in the care of this patient. Please reach out if any further questions.    Jairo Jang, PGY-6  Gastroenterology/Hepatology Fellow    Available on [employer] Teams  After 5PM/Weekends, please contact the on-call GI fellow: [phone]    
66 year old male with history of HTN, BPH, renal mass s/p nephrectomy who presents with lightheadedness.    # Epigastric/RUQ tenderness  # Elevated liver chemistries, improving  # Intra and extrahepatic biliary dilatation  # CBD 14mm  # GB sludge  Patient presents with lightheadedness, which appears chronic, however a/w epigastric/RUQ pain over the past week with nausea.  No subjective fevers or chills at home.  Abdominal US reveals intra and extrahepatic bile duct dilatation, CBD 14mm, and GB sludge.  MRI/MRCP aborted due to patient agitation.    Recommendations:  -trend clinical symptoms, exam findings, vital signs, CBC, CMP, INR  -complete infectious workup and f/u BCx  -aggressive IVF and empiric antibiotics following culture collection  -per [place] criteria, patient does not meet criteria for emergent ERCP  -unable to perform MRI/MRCP as above    Note incomplete until finalized by attending signature/attestation.    Joseph Woodard  GI/Hepatology Fellow    MONDAY-FRIDAY 8AM-5PM:  Pager# [phone] ([facility]) or [phone] ([facility])    NON-URGENT CONSULTS:  Please email [email or web address]  AT NIGHT AND ON WEEKENDS:  Contact on-call GI fellow via answering service ([phone]) from 5pm-8am and on weekends/holidays
66 year old male with pmhx HTN, renal mass s/p nephrectomy, BPH who is presenting with lightheadedness and abdominal pain found to have transaminitis found to have biliary mass  s/ ERCP
66 year old male with history of HTN, BPH, renal mass s/p nephrectomy who presents with lightheadedness.    # Epigastric/RUQ tenderness  # Elevated liver chemistries, improving  # Intra and extrahepatic biliary dilatation  # CBD 14mm  # GB sludge  Patient presents with lightheadedness, which appears chronic, however a/w epigastric/RUQ pain over the past week with nausea.  No subjective fevers or chills at home.  Abdominal US reveals intra and extrahepatic bile duct dilatation, CBD 14mm, and GB sludge.    Recommendations:  -trend clinical symptoms, exam findings, vital signs, CBC, CMP, INR  -complete infectious workup and f/u BCx  -aggressive IVF and empiric antibiotics following culture collection  -per [place] criteria, patient does not meet criteria for emergent ERCP  -f/u MRI/MRCP ordered    Note incomplete until finalized by attending signature/attestation.    Joseph Woodard  GI/Hepatology Fellow    MONDAY-FRIDAY 8AM-5PM:  Pager# [phone] ([facility]) or [phone] ([facility])    NON-URGENT CONSULTS:  Please email [email or web address]  AT NIGHT AND ON WEEKENDS:  Contact on-call GI fellow via answering service ([phone]) from 5pm-8am and on weekends/holidays
66 year old male with pmhx HTN, renal mass s/p nephrectomy, BPH who is presenting with lightheadedness and abdominal pain found to have transaminitis.
66 year old male with pmhx HTN, renal mass s/p nephrectomy, BPH who is presenting with lightheadedness and abdominal pain found to have transaminitis concern for passes stone pending ERCP
66 year old male with pmhx HTN, renal mass s/p nephrectomy, BPH who is presenting with lightheadedness and abdominal pain found to have transaminitis.
66 year old male with pmhx HTN, renal mass s/p nephrectomy, BPH who is presenting with lightheadedness and abdominal pain found to have transaminitis concern for passes stone pending ERCP
66 year old male with pmhx HTN, renal mass s/p nephrectomy, BPH who is presenting with lightheadedness and abdominal pain found to have transaminitis

## 2022-07-08 NOTE — DISCHARGE NOTE PROVIDER - CARE PROVIDER_API CALL
Rogelio Burnett)  Gastroenterology; Internal Medicine  Division of Gastroenterology - [address] [place] [address]  Phone: [phone]  Fax: [phone]  Follow Up Time: 2 weeks    PCP, PCP  Follow up with your primary care provider within 1 week of discharge  Phone: (   )    -  Fax: (   )    -  Follow Up Time:

## 2022-07-08 NOTE — PROGRESS NOTE ADULT - ATTENDING COMMENTS
Impression:  #1.  Improving RUQ abd pain  #2.  Abnormal LFTs, improved after ERCP 7/7/22.  #3.  Abnormal biliary tree on CT/MRI/MRCP.  Unable to tolerate MRCP due to agitation, now s/p MRCP with general anesthesia, demonstrating common hepatic duct stricture 1 cm long and possible additional intrahepatic strictures (vs. artifact).  Now s/p EGD/EUS/ERCP with sampling of biliary stricture and biliary stent with prophylactic pancreatic duct stent.    Recommendations:    #1.  Follow CBC/LFTs  #2.  Await cytology/pathology results.  #3.  Diet as tolerated.  #4.  Abd X ray in 2 weeks to check pancreatic stent position, if still in place, upper endoscopy within 4 weeks of ERCP for stent removal  #5.  ERCP in 2 months for biliary stent management. Impression:  #1.  Improving RUQ abd pain  #2.  Abnormal LFTs, improved after ERCP 7/7/22.  #3.  Abnormal biliary tree on CT/MRI/MRCP.  Unable to tolerate MRCP due to agitation, now s/p MRCP with general anesthesia, demonstrating common hepatic duct stricture 1 cm long and possible additional intrahepatic strictures (vs. artifact).  Now s/p EGD/EUS/ERCP with sampling of biliary stricture by EUS/FNB, brush cytology, and forceps biopsy, and biliary stent with prophylactic pancreatic duct stent.  #4.  Erosive gastritis, s/p EGD/biospsy    Recommendations:    #1.  Follow CBC/LFTs  #2.  Await cytology/pathology results.  #3.  Diet as tolerated.  #4.  Abd X ray in 2 weeks to check pancreatic stent position, if still in place, upper endoscopy within 4 weeks of ERCP for stent removal  #5.  ERCP in 2 months for biliary stent management.

## 2022-07-08 NOTE — DISCHARGE NOTE PROVIDER - NSDCCPCAREPLAN_GEN_ALL_CORE_FT
PRINCIPAL DISCHARGE DIAGNOSIS  Diagnosis: Jaundice  Assessment and Plan of Treatment: Jaundice likely due to common bile duct abnormality for which stent was placed. follow up with GI in 2 weeks  return to the ED for worsening abdominal pain, nausea, vomiting,      SECONDARY DISCHARGE DIAGNOSES  Diagnosis: Transaminitis  Assessment and Plan of Treatment: transaminitis likely due to biliary mass. Please follow up with GI in 2 weeks, please call and make the appointment  Abdominal Xray in 2 weeks to assess for stent location   Follow up with Dr. Burnett in 4 weeks   Repeat ERCP in 2 months for Common Bile Duct stent removal

## 2022-07-08 NOTE — DISCHARGE NOTE NURSING/CASE MANAGEMENT/SOCIAL WORK - NSDCPNINST_GEN_ALL_CORE
call md for any discomforts felt-- safety measures reemphasized--diabetic teaching done (according to patient wife is diabetic and had been doing fingerstick to check blood glucose so she will help him and guide him)

## 2022-07-08 NOTE — PROGRESS NOTE ADULT - PROBLEM SELECTOR PROBLEM 1
Mass of common bile duct
Transaminitis
Mass of common bile duct
Transaminitis

## 2022-07-09 LAB
CULTURE RESULTS: SIGNIFICANT CHANGE UP
CULTURE RESULTS: SIGNIFICANT CHANGE UP
SPECIMEN SOURCE: SIGNIFICANT CHANGE UP
SPECIMEN SOURCE: SIGNIFICANT CHANGE UP

## 2022-07-11 LAB
NON-GYNECOLOGICAL CYTOLOGY STUDY: SIGNIFICANT CHANGE UP
SURGICAL PATHOLOGY STUDY: SIGNIFICANT CHANGE UP

## 2022-07-12 LAB
IGG SERPL-MCNC: 1476 MG/DL — SIGNIFICANT CHANGE UP (ref 603–1613)
IGG1 SER-MCNC: 743 MG/DL — SIGNIFICANT CHANGE UP (ref 248–810)
IGG2 SER-MCNC: 443 MG/DL — SIGNIFICANT CHANGE UP (ref 130–555)
IGG3 SER-MCNC: 57 MG/DL — SIGNIFICANT CHANGE UP (ref 15–102)
IGG4 SER-MCNC: 129 MG/DL — HIGH (ref 2–96)

## 2022-07-13 DIAGNOSIS — Z96.89 PRESENCE OF OTHER SPECIFIED FUNCTIONAL IMPLANTS: ICD-10-CM

## 2022-07-13 LAB — NON-GYNECOLOGICAL CYTOLOGY STUDY: SIGNIFICANT CHANGE UP

## 2022-07-14 PROBLEM — N40.0 BENIGN PROSTATIC HYPERPLASIA WITHOUT LOWER URINARY TRACT SYMPTOMS: Chronic | Status: ACTIVE | Noted: 2022-07-03

## 2022-07-21 ENCOUNTER — INPATIENT (INPATIENT)
Facility: HOSPITAL | Age: 67
LOS: 0 days | Discharge: HOME | DRG: 392 | End: 2022-07-22
Attending: INTERNAL MEDICINE | Admitting: STUDENT IN AN ORGANIZED HEALTH CARE EDUCATION/TRAINING PROGRAM
Payer: COMMERCIAL

## 2022-07-21 VITALS
TEMPERATURE: 98 F | OXYGEN SATURATION: 98 % | HEIGHT: 67 IN | WEIGHT: 139.99 LBS | SYSTOLIC BLOOD PRESSURE: 133 MMHG | HEART RATE: 84 BPM | DIASTOLIC BLOOD PRESSURE: 75 MMHG | RESPIRATION RATE: 18 BRPM

## 2022-07-21 DIAGNOSIS — Z90.5 ACQUIRED ABSENCE OF KIDNEY: Chronic | ICD-10-CM

## 2022-07-21 DIAGNOSIS — I10 ESSENTIAL (PRIMARY) HYPERTENSION: ICD-10-CM

## 2022-07-21 DIAGNOSIS — R10.9 UNSPECIFIED ABDOMINAL PAIN: ICD-10-CM

## 2022-07-21 DIAGNOSIS — Z98.89 OTHER SPECIFIED POSTPROCEDURAL STATES: Chronic | ICD-10-CM

## 2022-07-21 DIAGNOSIS — Z87.438 PERSONAL HISTORY OF OTHER DISEASES OF MALE GENITAL ORGANS: ICD-10-CM

## 2022-07-21 DIAGNOSIS — K83.1 OBSTRUCTION OF BILE DUCT: ICD-10-CM

## 2022-07-21 DIAGNOSIS — Z29.9 ENCOUNTER FOR PROPHYLACTIC MEASURES, UNSPECIFIED: ICD-10-CM

## 2022-07-21 LAB
ALBUMIN SERPL ELPH-MCNC: 3.9 G/DL — SIGNIFICANT CHANGE UP (ref 3.3–5)
ALP SERPL-CCNC: 373 U/L — HIGH (ref 40–120)
ALT FLD-CCNC: 146 U/L — HIGH (ref 10–45)
ANION GAP SERPL CALC-SCNC: 15 MMOL/L — SIGNIFICANT CHANGE UP (ref 5–17)
ANISOCYTOSIS BLD QL: SIGNIFICANT CHANGE UP
APPEARANCE UR: CLEAR — SIGNIFICANT CHANGE UP
APTT BLD: 29.6 SEC — SIGNIFICANT CHANGE UP (ref 27.5–35.5)
AST SERPL-CCNC: 28 U/L — SIGNIFICANT CHANGE UP (ref 10–40)
BACTERIA # UR AUTO: NEGATIVE — SIGNIFICANT CHANGE UP
BASOPHILS # BLD AUTO: 0 K/UL — SIGNIFICANT CHANGE UP (ref 0–0.2)
BASOPHILS NFR BLD AUTO: 0 % — SIGNIFICANT CHANGE UP (ref 0–2)
BILIRUB SERPL-MCNC: 0.9 MG/DL — SIGNIFICANT CHANGE UP (ref 0.2–1.2)
BILIRUB UR-MCNC: NEGATIVE — SIGNIFICANT CHANGE UP
BUN SERPL-MCNC: 21 MG/DL — SIGNIFICANT CHANGE UP (ref 7–23)
CALCIUM SERPL-MCNC: 9 MG/DL — SIGNIFICANT CHANGE UP (ref 8.4–10.5)
CHLORIDE SERPL-SCNC: 103 MMOL/L — SIGNIFICANT CHANGE UP (ref 96–108)
CO2 SERPL-SCNC: 25 MMOL/L — SIGNIFICANT CHANGE UP (ref 22–31)
COLOR SPEC: SIGNIFICANT CHANGE UP
CREAT SERPL-MCNC: 1.23 MG/DL — SIGNIFICANT CHANGE UP (ref 0.5–1.3)
DIFF PNL FLD: ABNORMAL
EGFR: 64 ML/MIN/1.73M2 — SIGNIFICANT CHANGE UP
ELLIPTOCYTES BLD QL SMEAR: SLIGHT — SIGNIFICANT CHANGE UP
EOSINOPHIL # BLD AUTO: 0.21 K/UL — SIGNIFICANT CHANGE UP (ref 0–0.5)
EOSINOPHIL NFR BLD AUTO: 3.5 % — SIGNIFICANT CHANGE UP (ref 0–6)
EPI CELLS # UR: 0 /HPF — SIGNIFICANT CHANGE UP
GAS PNL BLDV: SIGNIFICANT CHANGE UP
GLUCOSE SERPL-MCNC: 132 MG/DL — HIGH (ref 70–99)
GLUCOSE UR QL: ABNORMAL
HCT VFR BLD CALC: 38.7 % — LOW (ref 39–50)
HGB BLD-MCNC: 12.4 G/DL — LOW (ref 13–17)
HYALINE CASTS # UR AUTO: 0 /LPF — SIGNIFICANT CHANGE UP (ref 0–2)
INR BLD: 1.03 RATIO — SIGNIFICANT CHANGE UP (ref 0.88–1.16)
KETONES UR-MCNC: NEGATIVE — SIGNIFICANT CHANGE UP
LEUKOCYTE ESTERASE UR-ACNC: NEGATIVE — SIGNIFICANT CHANGE UP
LIDOCAIN IGE QN: 9 U/L — SIGNIFICANT CHANGE UP (ref 7–60)
LYMPHOCYTES # BLD AUTO: 0.54 K/UL — LOW (ref 1–3.3)
LYMPHOCYTES # BLD AUTO: 8.8 % — LOW (ref 13–44)
MACROCYTES BLD QL: SLIGHT — SIGNIFICANT CHANGE UP
MANUAL SMEAR VERIFICATION: SIGNIFICANT CHANGE UP
MCHC RBC-ENTMCNC: 28.4 PG — SIGNIFICANT CHANGE UP (ref 27–34)
MCHC RBC-ENTMCNC: 32 GM/DL — SIGNIFICANT CHANGE UP (ref 32–36)
MCV RBC AUTO: 88.6 FL — SIGNIFICANT CHANGE UP (ref 80–100)
MONOCYTES # BLD AUTO: 0.81 K/UL — SIGNIFICANT CHANGE UP (ref 0–0.9)
MONOCYTES NFR BLD AUTO: 13.2 % — SIGNIFICANT CHANGE UP (ref 2–14)
NEUTROPHILS # BLD AUTO: 4.55 K/UL — SIGNIFICANT CHANGE UP (ref 1.8–7.4)
NEUTROPHILS NFR BLD AUTO: 68.4 % — SIGNIFICANT CHANGE UP (ref 43–77)
NEUTS BAND # BLD: 6.1 % — SIGNIFICANT CHANGE UP (ref 0–8)
NITRITE UR-MCNC: NEGATIVE — SIGNIFICANT CHANGE UP
PH UR: 7 — SIGNIFICANT CHANGE UP (ref 5–8)
PLAT MORPH BLD: NORMAL — SIGNIFICANT CHANGE UP
PLATELET # BLD AUTO: 186 K/UL — SIGNIFICANT CHANGE UP (ref 150–400)
POIKILOCYTOSIS BLD QL AUTO: SLIGHT — SIGNIFICANT CHANGE UP
POTASSIUM SERPL-MCNC: 4.1 MMOL/L — SIGNIFICANT CHANGE UP (ref 3.5–5.3)
POTASSIUM SERPL-SCNC: 4.1 MMOL/L — SIGNIFICANT CHANGE UP (ref 3.5–5.3)
PROT SERPL-MCNC: 7.7 G/DL — SIGNIFICANT CHANGE UP (ref 6–8.3)
PROT UR-MCNC: SIGNIFICANT CHANGE UP
PROTHROM AB SERPL-ACNC: 12 SEC — SIGNIFICANT CHANGE UP (ref 10.5–13.4)
RBC # BLD: 4.37 M/UL — SIGNIFICANT CHANGE UP (ref 4.2–5.8)
RBC # FLD: 16.4 % — HIGH (ref 10.3–14.5)
RBC BLD AUTO: ABNORMAL
RBC CASTS # UR COMP ASSIST: 6 /HPF — HIGH (ref 0–4)
SARS-COV-2 RNA SPEC QL NAA+PROBE: SIGNIFICANT CHANGE UP
SODIUM SERPL-SCNC: 143 MMOL/L — SIGNIFICANT CHANGE UP (ref 135–145)
SP GR SPEC: 1.03 — HIGH (ref 1.01–1.02)
TARGETS BLD QL SMEAR: SIGNIFICANT CHANGE UP
UROBILINOGEN FLD QL: NEGATIVE — SIGNIFICANT CHANGE UP
WBC # BLD: 6.11 K/UL — SIGNIFICANT CHANGE UP (ref 3.8–10.5)
WBC # FLD AUTO: 6.11 K/UL — SIGNIFICANT CHANGE UP (ref 3.8–10.5)
WBC UR QL: 0 /HPF — SIGNIFICANT CHANGE UP (ref 0–5)

## 2022-07-21 PROCEDURE — 71045 X-RAY EXAM CHEST 1 VIEW: CPT | Mod: 26

## 2022-07-21 PROCEDURE — 99285 EMERGENCY DEPT VISIT HI MDM: CPT

## 2022-07-21 PROCEDURE — 99223 1ST HOSP IP/OBS HIGH 75: CPT | Mod: GC

## 2022-07-21 PROCEDURE — 74177 CT ABD & PELVIS W/CONTRAST: CPT | Mod: 26,MD

## 2022-07-21 RX ORDER — TAMSULOSIN HYDROCHLORIDE 0.4 MG/1
0.4 CAPSULE ORAL AT BEDTIME
Refills: 0 | Status: DISCONTINUED | OUTPATIENT
Start: 2022-07-21 | End: 2022-07-22

## 2022-07-21 RX ORDER — ENOXAPARIN SODIUM 100 MG/ML
40 INJECTION SUBCUTANEOUS EVERY 24 HOURS
Refills: 0 | Status: DISCONTINUED | OUTPATIENT
Start: 2022-07-21 | End: 2022-07-22

## 2022-07-21 RX ORDER — SODIUM CHLORIDE 9 MG/ML
1000 INJECTION, SOLUTION INTRAVENOUS ONCE
Refills: 0 | Status: COMPLETED | OUTPATIENT
Start: 2022-07-21 | End: 2022-07-21

## 2022-07-21 RX ORDER — LANOLIN ALCOHOL/MO/W.PET/CERES
3 CREAM (GRAM) TOPICAL AT BEDTIME
Refills: 0 | Status: DISCONTINUED | OUTPATIENT
Start: 2022-07-21 | End: 2022-07-22

## 2022-07-21 RX ORDER — CEFEPIME 1 G/1
2000 INJECTION, POWDER, FOR SOLUTION INTRAMUSCULAR; INTRAVENOUS ONCE
Refills: 0 | Status: COMPLETED | OUTPATIENT
Start: 2022-07-21 | End: 2022-07-21

## 2022-07-21 RX ORDER — OXYBUTYNIN CHLORIDE 5 MG
10 TABLET ORAL
Refills: 0 | Status: DISCONTINUED | OUTPATIENT
Start: 2022-07-21 | End: 2022-07-22

## 2022-07-21 RX ORDER — METRONIDAZOLE 500 MG
500 TABLET ORAL ONCE
Refills: 0 | Status: COMPLETED | OUTPATIENT
Start: 2022-07-21 | End: 2022-07-21

## 2022-07-21 RX ORDER — AMLODIPINE BESYLATE 2.5 MG/1
10 TABLET ORAL DAILY
Refills: 0 | Status: DISCONTINUED | OUTPATIENT
Start: 2022-07-21 | End: 2022-07-22

## 2022-07-21 RX ADMIN — CEFEPIME 100 MILLIGRAM(S): 1 INJECTION, POWDER, FOR SOLUTION INTRAMUSCULAR; INTRAVENOUS at 09:23

## 2022-07-21 RX ADMIN — SODIUM CHLORIDE 1000 MILLILITER(S): 9 INJECTION, SOLUTION INTRAVENOUS at 09:12

## 2022-07-21 RX ADMIN — Medication 100 MILLIGRAM(S): at 10:07

## 2022-07-21 RX ADMIN — Medication 3 MILLIGRAM(S): at 21:42

## 2022-07-21 RX ADMIN — TAMSULOSIN HYDROCHLORIDE 0.4 MILLIGRAM(S): 0.4 CAPSULE ORAL at 21:42

## 2022-07-21 NOTE — H&P ADULT - NSICDXPASTMEDICALHX_GEN_ALL_CORE_FT
PAST MEDICAL HISTORY:  BPH (benign prostatic hyperplasia)     Hypertension      PAST MEDICAL HISTORY:  Biliary stricture     BPH (benign prostatic hyperplasia)     Dizziness of unknown etiology     Hypertension

## 2022-07-21 NOTE — H&P ADULT - NSHPREVIEWOFSYSTEMS_GEN_ALL_CORE
REVIEW OF SYSTEMS:  CONSTITUTIONAL: No fever, chills, night sweats, or fatigue  EYES: No eye pain, visual disturbances, or discharge  ENMT:  No difficulty hearing, tinnitus, vertigo; No sinus or throat pain  NECK: No pain or stiffness  RESPIRATORY: No cough, wheezing, or hemoptysis; No shortness of breath  CARDIOVASCULAR: No chest pain, palpitations, dizziness, or leg swelling  GASTROINTESTINAL: No abdominal or epigastric pain. No nausea, vomiting, or hematemesis; No diarrhea or constipation. No melena or hematochezia.  GENITOURINARY: No dysuria, frequency, hematuria, or incontinence  NEUROLOGICAL: No headaches, memory loss, loss of strength, numbness, or tremors  SKIN: No itching, burning, rashes, or lesions   LYMPH NODES: No enlarged glands  ENDOCRINE: No heat or cold intolerance; No hair loss  MUSCULOSKELETAL: No joint pain or swelling; No muscle, back, or extremity pain  PSYCHIATRIC: No depression, anxiety, mood swings, or difficulty sleeping  HEME/LYMPH: No easy bruising, or bleeding gums  ALLERGY AND IMMUNOLOGIC: No hives or eczema REVIEW OF SYSTEMS:  CONSTITUTIONAL: No fatigue; fevers and night sweats as per HPI  EYES: No eye pain, visual disturbances, or discharge  ENMT:  No difficulty hearing, tinnitus, vertigo; No sinus or throat pain  NECK: No pain or stiffness  RESPIRATORY: No cough, wheezing, or hemoptysis; No shortness of breath  CARDIOVASCULAR: No chest pain, palpitations, dizziness, or leg swelling  GASTROINTESTINAL: Epigastric pain as per HPI. No nausea, vomiting, or hematemesis; No diarrhea or constipation. No melena or hematochezia.  GENITOURINARY: No dysuria, frequency, hematuria, or incontinence  NEUROLOGICAL: No headaches, memory loss, loss of strength, numbness, or tremors  SKIN: No itching, jaundice, burning, rashes, or lesions   LYMPH NODES: No enlarged glands  ENDOCRINE: No heat or cold intolerance; No hair loss  MUSCULOSKELETAL: No joint pain or swelling; No muscle, back, or extremity pain  PSYCHIATRIC: No depression, anxiety, mood swings, or difficulty sleeping  HEME/LYMPH: No easy bruising, or bleeding gums  ALLERGY AND IMMUNOLOGIC: No hives or eczema REVIEW OF SYSTEMS:  CONSTITUTIONAL: No fatigue; fevers, night sweats, and weight loss as per HPI  EYES: No eye pain, visual disturbances, or discharge  ENMT:  No difficulty hearing, tinnitus, vertigo; No sinus or throat pain  NECK: No pain or stiffness  RESPIRATORY: No cough, wheezing, or hemoptysis; No shortness of breath  CARDIOVASCULAR: No chest pain, palpitations, dizziness, or leg swelling  GASTROINTESTINAL: Epigastric pain as per HPI. No nausea, vomiting, or hematemesis; No diarrhea or constipation. No melena or hematochezia.  GENITOURINARY: No dysuria, frequency, hematuria, or incontinence  NEUROLOGICAL: No headaches, memory loss, loss of strength, numbness, or tremors  SKIN: No itching, jaundice, burning, rashes, or lesions   LYMPH NODES: No enlarged glands  ENDOCRINE: No heat or cold intolerance; No hair loss  MUSCULOSKELETAL: No joint pain or swelling; No muscle, back, or extremity pain  PSYCHIATRIC: No depression, anxiety, mood swings, or difficulty sleeping  HEME/LYMPH: No easy bruising, or bleeding gums  ALLERGY AND IMMUNOLOGIC: No hives or eczema

## 2022-07-21 NOTE — ED PROVIDER NOTE - OBJECTIVE STATEMENT
66 year old male with pmhx HTN, renal mass s/p nephrectomy, BPH, recent admission for transaminitis, was found to have biliary stricture likely related to cholangiocarcinoma with biliary stent placement about 10 days ago coming in with abdominal pain. 66 year old male with pmhx HTN, renal mass s/p nephrectomy, BPH, recent admission for transaminitis, was found to have biliary stricture likely related to cholangiocarcinoma with biliary stent placement about 10 days ago coming in with abdominal pain.  Pain started about 3 days ago and also has had about 2 days of feeling feverish but did not take his temp.  Pain is in the epigastrium worse with palpation nothing makes it better.  No chest pain, no vomiting.  no urinary complaints, diarrhea, or URI complaints.  Currently only with tenderness when he presses.

## 2022-07-21 NOTE — ED PROVIDER NOTE - NS ED ATTENDING STATEMENT MOD
This was a shared visit with the DIANA. I reviewed and verified the documentation and independently performed the documented:

## 2022-07-21 NOTE — ED ADULT NURSE NOTE - OBJECTIVE STATEMENT
65 yo M presents to ED A+OX3 c/o abdominal pain x 1 week. Patient reports having "procedure" one week ago, reports having constant epigastric pain since the procedure. States he also has been having intermittent fevers over the last few days, unsure what the highest temperature was. Denies N/V, chest pain, SOB. Reports normal PO intake. Patient is well appearing, sitting up in stretcher in no acute distress. Breathing spontaneous and unlabored on room air. Skin warm dry and of color appropriate for ethnicity. Abdomen soft, nondistended, nontender. Bed in lowest position, side rails up.

## 2022-07-21 NOTE — H&P ADULT - HISTORY OF PRESENT ILLNESS
HPI:     Patient is a 66 year old man with a PMH of HTN, renal tumor s/p right nephrectomy, BPH and transaminitis with biliary strictures concerning for choriocarcinoma s/p PD and CBD stent placement on 7/7 presenting with abdominal pain and subjective fevers. The patient describes epigastric abdominal pain on palpation that began 3 days ago and resolved last night. He also endorses subjective fever and night sweats at night for two nights which resolved yesterday. Pt denies pain or fever currently. Patient denies nausea, vomiting, diarrhea, constipation,  loss of appetite, changes in the color of the urine or stool. Prior to the surgery the patient experienced loss of appetite and weight loss for 2-3 weeks. Patient describes spells of dizziness that last for a week and occur annually, which was the reason he presented to the ED initially in early July.     In the ED he was afebrile, with HR 72, /73, RR 18 with SaO2 99% on RA. Pt was given 1 dose of metronidazole and cefepime.      Patient is a 66 year old man with a PMH of HTN, renal tumor s/p right nephrectomy, BPH and transaminitis with biliary stricture due to mass concerning for choriocarcinoma s/p PD and CBD stent placement on 7/7 presenting with abdominal pain and subjective fevers. The patient describes epigastric abdominal pain on palpation that began 3 days ago and resolved last night. He also endorses subjective fever and night sweats at night for two nights which resolved yesterday. Pt denies pain or fever currently. Patient denies nausea, vomiting, diarrhea, constipation,  loss of appetite, changes in the color of the urine or stool. Prior to the surgery the patient experienced loss of appetite and weight loss for 2-3 weeks. Patient describes spells of dizziness that last for a week and occur annually, which was the reason he presented to the ED initially in early July.     In the ED he was afebrile, with HR 72, /73, RR 18 with SaO2 99% on RA. Pt was given 1 dose of metronidazole and cefepime.      Patient is a 66 year old man with a PMH of HTN, renal tumor s/p right nephrectomy, BPH and transaminitis with biliary stricture due to mass concerning for cholangiocarcinoma s/p PD and CBD stent placement on 7/7 presenting with abdominal pain and subjective fevers. The patient describes epigastric abdominal pain on palpation that began 3 days ago and resolved last night. He also endorses subjective fever and night sweats at night for two nights which resolved yesterday. Pt denies pain or fever currently. Patient denies nausea, vomiting, diarrhea, constipation,  loss of appetite, changes in the color of the urine or stool. Prior to the surgery the patient experienced loss of appetite and weight loss for 2-3 weeks. Patient describes spells of dizziness that last for a week and occur annually, which was the reason he presented to the ED initially in early July.     In the ED he was afebrile, with HR 72, /73, RR 18 with SaO2 99% on RA. Pt was given 1 dose of metronidazole and cefepime.

## 2022-07-21 NOTE — H&P ADULT - NSHPPHYSICALEXAM_GEN_ALL_CORE
T(C): 36.8 (07-21-22 @ 11:37), Max: 37.3 (07-21-22 @ 08:59)  HR: 72 (07-21-22 @ 11:37) (72 - 84)  BP: 131/73 (07-21-22 @ 11:37) (131/73 - 133/75)  RR: 18 (07-21-22 @ 11:37) (18 - 20)  SpO2: 99% (07-21-22 @ 11:37) (98% - 99%)    CONSTITUTIONAL: Well groomed, no apparent distress    EYES: PERRLA and symmetric, EOMI, No conjunctival or scleral injection, non-icteric    ENMT: Oral mucosa with moist membranes. No external nasal lesions; nasal mucosa not inflamed; normal dentition; no pharyngeal injection or exudates. Otoscopic exam with normal tympanic membranes; no gross hearing impairment noted.  	NECK: Supple, symmetric and without tracheal deviation; thyroid gland not enlarged and without palpable masses    RESPIRATORY: No respiratory distress, no use of accessory muscles; CTA b/l, no wheezes, rales or rhonchi, no dullness or hyperresonance to percussion, no tactile fremitus, no subcutaneous emphysema    CARDIOVASCULAR: RRRR, +S1S2, no murmurs, no rubs, no gallops; no JVD; no peripheral edema  	Vascular: no carotid bruits; no abdominal bruit; carotid pulse palpable, radial pulse palpable, femoral pulse palpable, dorsalis pedis pulse palpable, posterior tibialis pulse palpable    GASTROINTESTINAL: Soft, non tender, non distended, no rebound, no guarding; No palpable masses; no hepatosplenomegaly; no hernia palpated;  	Rectal: normal sphincter tone and no masses palpated; stool negative for blood    MUSCULOSKELETAL: Normal gait and station; no digital clubbing or cyanosis; examination of the (head/neck, spine/ribs/pelvis, RUE, LUE, RLE, LLE) without misalignment, normal range of motion without pain, no spinal tenderness, normal muscle strength/tone    SKIN: No rashes or ulcers noted; no subcutaneous nodules or induration palpable    NEUROLOGIC: CN II-XII intact; normal reflexes in upper and lower extremities, sensation intact in upper and lower extremities b/l to light touch; Babinski down b/l; no Kernig’s sign, no Brudzinski’s sign    PSYCHIATRIC: Appropriate insight/judgment; A+O x 3, mood and affect appropriate, recent/remote memory intact T(C): 36.8 (07-21-22 @ 11:37), Max: 37.3 (07-21-22 @ 08:59)  HR: 72 (07-21-22 @ 11:37) (72 - 84)  BP: 131/73 (07-21-22 @ 11:37) (131/73 - 133/75)  RR: 18 (07-21-22 @ 11:37) (18 - 20)  SpO2: 99% (07-21-22 @ 11:37) (98% - 99%)    CONSTITUTIONAL: Well groomed, no apparent distress    EYES: PERRLA and symmetric, EOMI, No conjunctival or scleral injection, non-icteric    ENMT: Oral mucosa with moist membranes. No external nasal lesions; nasal mucosa not inflamed; normal dentition; no pharyngeal injection or exudates. Otoscopic exam with normal tympanic membranes; no gross hearing impairment noted.    NECK: Supple, symmetric and without tracheal deviation; thyroid gland not enlarged and without palpable masses    RESPIRATORY: No respiratory distress, no use of accessory muscles; CTA b/l, no wheezes, rales or rhonchi, no dullness or hyperresonance to percussion, no tactile fremitus, no subcutaneous emphysema    CARDIOVASCULAR: RRRR, +S1S2, no murmurs, no rubs, no gallops; no JVD; no peripheral edema    Vascular: no carotid bruits; no abdominal bruit; carotid pulse palpable, radial pulse palpable, femoral pulse palpable, dorsalis pedis pulse palpable, posterior tibialis pulse palpable    GASTROINTESTINAL: Soft, non tender, non distended, no rebound, no guarding; Negative Shah's sign;  No palpable masses; no hepatosplenomegaly; no hernia palpated;    Rectal: normal sphincter tone and no masses palpated; stool negative for blood    MUSCULOSKELETAL: Normal gait and station; no digital clubbing or cyanosis; examination of the (head/neck, spine/ribs/pelvis, RUE, LUE, RLE, LLE) without misalignment, normal range of motion without pain, no spinal tenderness, normal muscle strength/tone    SKIN: No rashes or ulcers noted; no subcutaneous nodules or induration palpable    NEUROLOGIC: CN II-XII intact; normal reflexes in upper and lower extremities, sensation intact in upper and lower extremities b/l to light touch; Babinski down b/l; no Kernig’s sign, no Brudzinski’s sign    PSYCHIATRIC: Appropriate insight/judgment; A+O x 3, mood and affect appropriate, recent/remote memory intact

## 2022-07-21 NOTE — H&P ADULT - ASSESSMENT
Pt is a 66 year old male with a PMH of HTN, BPH, renal tumor s/p right nephrectomy, and biliary stricutres concerning for cholangiocarcinoma s/p PD and CBD stent placement on 7/7 presenting with epigastric abdominal pain and subjective night time fevers. Abdominal pain and fevers is concerning for cholangitis vs, stent occlusion vs. cholangiocarcinoma. The patient has no leukocytosis, fevers, or jaundice and CT was unchanged from 7/7 making cholangitis less likely. Elevation of the alk phos makes blockage of the stent more likely.  Pt is a 66 year old male with a PMH of HTN, BPH, renal tumor s/p right nephrectomy, and biliary stricutres concerning for cholangiocarcinoma s/p PD and CBD stent placement on 7/7 presenting with epigastric abdominal pain and subjective night time fevers. Abdominal pain and fevers is concerning for cholangitis vs, stent occlusion vs. cholangiocarcinoma. The patient has no leukocytosis, fevers, or jaundice and CT was unchanged from 7/7 making acute cholangitis less likely. Elevation of the alk phos to 373 from 286 (7/7) makes blockage of the stent more likely.

## 2022-07-21 NOTE — H&P ADULT - NSHPSOCIALHISTORY_GEN_ALL_CORE
Social History: Social History: Patient denies smoking or alcohol use. Lives at home with his wife and denies any issues carrying out his ADLs. Patient states he has no issues walking.

## 2022-07-21 NOTE — H&P ADULT - PROBLEM SELECTOR PLAN 2
Pt has biliary stricture s/p PD and CBD stenting on 7/7 which is concerning for cholangiocarcinoma. Pt's history of prior weight loss is also concerning for malignancy.   - f/u results of the biopsy Pt has biliary stricture 2/2 mass s/p PD and CBD stenting on 7/7 which is concerning for cholangiocarcinoma. Pt's history of prior weight loss is also concerning for malignancy.   - f/u results of the biopsy Pt has biliary stricture 2/2 mass s/p PD and CBD stenting on 7/7 which is concerning for cholangiocarcinoma. Pt's history of prior weight loss is also concerning for malignancy.   - f/u results of the biopsy  -trend alk phos
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
## 2022-07-21 NOTE — ED ADULT NURSE REASSESSMENT NOTE - NS ED NURSE REASSESS COMMENT FT1
Patient updated on plan of care. Patient sitting up in stretcher in no acute distress. Breathing spontaneous and unlabored. Denies pain at this time. Comfort and safety measures in place.

## 2022-07-21 NOTE — H&P ADULT - PROBLEM SELECTOR PLAN 1
Pt presented with epigastric 3 days of abdominal pain and subjective nighttime fevers s/p biliary stenting on 7/7 due to biliary stricture concerning for cholangiocarcinoma. Patient currently has none of the presenting symptoms for cholangitis (RUQ pain, fever, jaundice)  -Pt currently meets none of the SIRS criteria; continue to monitor for changes  -Consult GI for evaluation   -consider MRCP w/wo contrast

## 2022-07-21 NOTE — H&P ADULT - ATTENDING COMMENTS
66 year old male with a PMH of HTN, BPH, renal tumor s/p right nephrectomy, and biliary stricutres concerning for cholangiocarcinoma s/p PD and CBD stent placement on 7/7 presenting with epigastric abdominal pain    Epigastric pain s/p CBD stent for stricture due to suspected Cholangiocarcinoma    Pt reports fever of 103F at home. Abd exam is benign. Send septic workup. Consult GI. Biopsies appear inconclusive, may need repeat biopsy.   May obtain MRCP.   D/w resident and patient in detail

## 2022-07-22 ENCOUNTER — TRANSCRIPTION ENCOUNTER (OUTPATIENT)
Age: 67
End: 2022-07-22

## 2022-07-22 VITALS
SYSTOLIC BLOOD PRESSURE: 144 MMHG | RESPIRATION RATE: 18 BRPM | HEART RATE: 68 BPM | DIASTOLIC BLOOD PRESSURE: 80 MMHG | OXYGEN SATURATION: 98 % | TEMPERATURE: 99 F

## 2022-07-22 LAB
A1C WITH ESTIMATED AVERAGE GLUCOSE RESULT: 6.8 % — HIGH (ref 4–5.6)
ALBUMIN SERPL ELPH-MCNC: 3.3 G/DL — SIGNIFICANT CHANGE UP (ref 3.3–5)
ALP SERPL-CCNC: 304 U/L — HIGH (ref 40–120)
ALT FLD-CCNC: 98 U/L — HIGH (ref 10–45)
ANION GAP SERPL CALC-SCNC: 10 MMOL/L — SIGNIFICANT CHANGE UP (ref 5–17)
ANISOCYTOSIS BLD QL: SLIGHT — SIGNIFICANT CHANGE UP
APTT BLD: 29.4 SEC — SIGNIFICANT CHANGE UP (ref 27.5–35.5)
AST SERPL-CCNC: 17 U/L — SIGNIFICANT CHANGE UP (ref 10–40)
BASOPHILS # BLD AUTO: 0 K/UL — SIGNIFICANT CHANGE UP (ref 0–0.2)
BASOPHILS NFR BLD AUTO: 0 % — SIGNIFICANT CHANGE UP (ref 0–2)
BILIRUB SERPL-MCNC: 0.6 MG/DL — SIGNIFICANT CHANGE UP (ref 0.2–1.2)
BUN SERPL-MCNC: 18 MG/DL — SIGNIFICANT CHANGE UP (ref 7–23)
CALCIUM SERPL-MCNC: 8.6 MG/DL — SIGNIFICANT CHANGE UP (ref 8.4–10.5)
CHLORIDE SERPL-SCNC: 102 MMOL/L — SIGNIFICANT CHANGE UP (ref 96–108)
CHOLEST SERPL-MCNC: 180 MG/DL — SIGNIFICANT CHANGE UP
CO2 SERPL-SCNC: 27 MMOL/L — SIGNIFICANT CHANGE UP (ref 22–31)
CREAT SERPL-MCNC: 1.21 MG/DL — SIGNIFICANT CHANGE UP (ref 0.5–1.3)
CULTURE RESULTS: NO GROWTH — SIGNIFICANT CHANGE UP
EGFR: 65 ML/MIN/1.73M2 — SIGNIFICANT CHANGE UP
ELLIPTOCYTES BLD QL SMEAR: SLIGHT — SIGNIFICANT CHANGE UP
EOSINOPHIL # BLD AUTO: 0.2 K/UL — SIGNIFICANT CHANGE UP (ref 0–0.5)
EOSINOPHIL NFR BLD AUTO: 4.4 % — SIGNIFICANT CHANGE UP (ref 0–6)
ESTIMATED AVERAGE GLUCOSE: 148 MG/DL — HIGH (ref 68–114)
GLUCOSE SERPL-MCNC: 101 MG/DL — HIGH (ref 70–99)
HCT VFR BLD CALC: 40.4 % — SIGNIFICANT CHANGE UP (ref 39–50)
HDLC SERPL-MCNC: 47 MG/DL — SIGNIFICANT CHANGE UP
HGB BLD-MCNC: 12.9 G/DL — LOW (ref 13–17)
INR BLD: 1.03 RATIO — SIGNIFICANT CHANGE UP (ref 0.88–1.16)
LIPID PNL WITH DIRECT LDL SERPL: 115 MG/DL — HIGH
LYMPHOCYTES # BLD AUTO: 0.78 K/UL — LOW (ref 1–3.3)
LYMPHOCYTES # BLD AUTO: 17.4 % — SIGNIFICANT CHANGE UP (ref 13–44)
MACROCYTES BLD QL: SLIGHT — SIGNIFICANT CHANGE UP
MANUAL SMEAR VERIFICATION: SIGNIFICANT CHANGE UP
MCHC RBC-ENTMCNC: 29.4 PG — SIGNIFICANT CHANGE UP (ref 27–34)
MCHC RBC-ENTMCNC: 31.9 GM/DL — LOW (ref 32–36)
MCV RBC AUTO: 92 FL — SIGNIFICANT CHANGE UP (ref 80–100)
METAMYELOCYTES # FLD: 0.9 % — HIGH (ref 0–0)
MONOCYTES # BLD AUTO: 0.51 K/UL — SIGNIFICANT CHANGE UP (ref 0–0.9)
MONOCYTES NFR BLD AUTO: 11.3 % — SIGNIFICANT CHANGE UP (ref 2–14)
NEUTROPHILS # BLD AUTO: 2.98 K/UL — SIGNIFICANT CHANGE UP (ref 1.8–7.4)
NEUTROPHILS NFR BLD AUTO: 61.7 % — SIGNIFICANT CHANGE UP (ref 43–77)
NEUTS BAND # BLD: 4.3 % — SIGNIFICANT CHANGE UP (ref 0–8)
NON HDL CHOLESTEROL: 133 MG/DL — HIGH
PLAT MORPH BLD: NORMAL — SIGNIFICANT CHANGE UP
PLATELET # BLD AUTO: 199 K/UL — SIGNIFICANT CHANGE UP (ref 150–400)
POTASSIUM SERPL-MCNC: 4.1 MMOL/L — SIGNIFICANT CHANGE UP (ref 3.5–5.3)
POTASSIUM SERPL-SCNC: 4.1 MMOL/L — SIGNIFICANT CHANGE UP (ref 3.5–5.3)
PROT SERPL-MCNC: 6.9 G/DL — SIGNIFICANT CHANGE UP (ref 6–8.3)
PROTHROM AB SERPL-ACNC: 11.8 SEC — SIGNIFICANT CHANGE UP (ref 10.5–13.4)
RBC # BLD: 4.39 M/UL — SIGNIFICANT CHANGE UP (ref 4.2–5.8)
RBC # FLD: 16 % — HIGH (ref 10.3–14.5)
RBC BLD AUTO: ABNORMAL
SODIUM SERPL-SCNC: 139 MMOL/L — SIGNIFICANT CHANGE UP (ref 135–145)
SPECIMEN SOURCE: SIGNIFICANT CHANGE UP
TARGETS BLD QL SMEAR: SLIGHT — SIGNIFICANT CHANGE UP
TRIGL SERPL-MCNC: 91 MG/DL — SIGNIFICANT CHANGE UP
WBC # BLD: 4.51 K/UL — SIGNIFICANT CHANGE UP (ref 3.8–10.5)
WBC # FLD AUTO: 4.51 K/UL — SIGNIFICANT CHANGE UP (ref 3.8–10.5)

## 2022-07-22 PROCEDURE — 36415 COLL VENOUS BLD VENIPUNCTURE: CPT

## 2022-07-22 PROCEDURE — 84132 ASSAY OF SERUM POTASSIUM: CPT

## 2022-07-22 PROCEDURE — 82947 ASSAY GLUCOSE BLOOD QUANT: CPT

## 2022-07-22 PROCEDURE — 97161 PT EVAL LOW COMPLEX 20 MIN: CPT

## 2022-07-22 PROCEDURE — 84145 PROCALCITONIN (PCT): CPT

## 2022-07-22 PROCEDURE — 87086 URINE CULTURE/COLONY COUNT: CPT

## 2022-07-22 PROCEDURE — 99222 1ST HOSP IP/OBS MODERATE 55: CPT

## 2022-07-22 PROCEDURE — 99285 EMERGENCY DEPT VISIT HI MDM: CPT

## 2022-07-22 PROCEDURE — 74177 CT ABD & PELVIS W/CONTRAST: CPT | Mod: MD

## 2022-07-22 PROCEDURE — 93005 ELECTROCARDIOGRAM TRACING: CPT

## 2022-07-22 PROCEDURE — 82435 ASSAY OF BLOOD CHLORIDE: CPT

## 2022-07-22 PROCEDURE — 85025 COMPLETE CBC W/AUTO DIFF WBC: CPT

## 2022-07-22 PROCEDURE — 82803 BLOOD GASES ANY COMBINATION: CPT

## 2022-07-22 PROCEDURE — 81001 URINALYSIS AUTO W/SCOPE: CPT

## 2022-07-22 PROCEDURE — 83036 HEMOGLOBIN GLYCOSYLATED A1C: CPT

## 2022-07-22 PROCEDURE — 71045 X-RAY EXAM CHEST 1 VIEW: CPT

## 2022-07-22 PROCEDURE — 80053 COMPREHEN METABOLIC PANEL: CPT

## 2022-07-22 PROCEDURE — 85730 THROMBOPLASTIN TIME PARTIAL: CPT

## 2022-07-22 PROCEDURE — 85610 PROTHROMBIN TIME: CPT

## 2022-07-22 PROCEDURE — 96375 TX/PRO/DX INJ NEW DRUG ADDON: CPT

## 2022-07-22 PROCEDURE — U0003: CPT

## 2022-07-22 PROCEDURE — 80061 LIPID PANEL: CPT

## 2022-07-22 PROCEDURE — U0005: CPT

## 2022-07-22 PROCEDURE — 87040 BLOOD CULTURE FOR BACTERIA: CPT

## 2022-07-22 PROCEDURE — 96374 THER/PROPH/DIAG INJ IV PUSH: CPT | Mod: XU

## 2022-07-22 PROCEDURE — 83690 ASSAY OF LIPASE: CPT

## 2022-07-22 PROCEDURE — 85014 HEMATOCRIT: CPT

## 2022-07-22 PROCEDURE — 83605 ASSAY OF LACTIC ACID: CPT

## 2022-07-22 PROCEDURE — 84295 ASSAY OF SERUM SODIUM: CPT

## 2022-07-22 PROCEDURE — 99239 HOSP IP/OBS DSCHRG MGMT >30: CPT

## 2022-07-22 PROCEDURE — 82330 ASSAY OF CALCIUM: CPT

## 2022-07-22 PROCEDURE — 85018 HEMOGLOBIN: CPT

## 2022-07-22 RX ADMIN — AMLODIPINE BESYLATE 10 MILLIGRAM(S): 2.5 TABLET ORAL at 05:16

## 2022-07-22 RX ADMIN — Medication 10 MILLIGRAM(S): at 05:16

## 2022-07-22 RX ADMIN — ENOXAPARIN SODIUM 40 MILLIGRAM(S): 100 INJECTION SUBCUTANEOUS at 05:16

## 2022-07-22 NOTE — DISCHARGE NOTE NURSING/CASE MANAGEMENT/SOCIAL WORK - NSDCPEFALRISK_GEN_ALL_CORE
For information on Fall & Injury Prevention, visit: [email or web address] OR  [email or web address] OR  https://www.cdc.gov/steadi/patient.html

## 2022-07-22 NOTE — DISCHARGE NOTE PROVIDER - NSDCMRMEDTOKEN_GEN_ALL_CORE_FT
alcohol swabs : Apply topically to affected area 4 times a day   Freestyle Komal 2 Adams: Dispense 1 Adams  Freestyle Komal 2 sensors: Apply 1 sensor every 14 days  Freestyle Precision Edgardo Strips: Test blood sugar four times a day when you see check blood glucose symbol or when symptoms don&#x27;t match Komal reading.  Jardiance 10 mg oral tablet: 10 tab(s) orally once a day   lancets: 1 application subcutaneously 4 times a day   Norvasc 10 mg oral tablet: 1 tab(s) orally once a day  omeprazole 40 mg oral delayed release capsule: 1 cap(s) orally once a day  solifenacin 10 mg oral tablet: 1 tab(s) orally once a day  tamsulosin 0.4 mg oral capsule: 1 cap(s) orally once a day (at bedtime)   Jardiance 10 mg oral tablet: 10 tab(s) orally once a day   Norvasc 10 mg oral tablet: 1 tab(s) orally once a day  omeprazole 40 mg oral delayed release capsule: 1 cap(s) orally once a day  solifenacin 10 mg oral tablet: 1 tab(s) orally once a day  tamsulosin 0.4 mg oral capsule: 1 cap(s) orally once a day (at bedtime)

## 2022-07-22 NOTE — PROGRESS NOTE ADULT - PROBLEM SELECTOR PLAN 1
Pt presented with epigastric 3 days of abdominal pain and subjective nighttime fevers s/p biliary stenting on 7/7 due to biliary stricture concerning for cholangiocarcinoma. Patient currently has none of the presenting symptoms for cholangitis (RUQ pain, fever, jaundice)  -Pt currently meets none of the SIRS criteria; continue to monitor for changes  -Consult GI for evaluation   -consider MRCP w/wo contrast Pt presented with epigastric 3 days of abdominal pain and subjective nighttime fevers s/p biliary stenting on 7/7 due to biliary stricture concerning for cholangiocarcinoma. Patient currently has none of the presenting symptoms for cholangitis (RUQ pain, fever, jaundice)  -Pt currently meets none of the SIRS criteria; continue to monitor for changes  -Consult GI for evaluation: no recommendations

## 2022-07-22 NOTE — PROGRESS NOTE ADULT - ASSESSMENT
Pt is a 66 year old male with a PMH of HTN, BPH, renal tumor s/p right nephrectomy, and biliary stricutres concerning for cholangiocarcinoma s/p PD and CBD stent placement on 7/7 presenting with epigastric abdominal pain and subjective night time fevers. Abdominal pain and fevers is concerning for cholangitis vs, stent occlusion vs. cholangiocarcinoma. The patient has no leukocytosis, fevers, or jaundice and CT was unchanged from 7/7 making acute cholangitis less likely. Elevation of the alk phos to 373 from 286 (7/7) makes blockage of the stent more likely.

## 2022-07-22 NOTE — DISCHARGE NOTE NURSING/CASE MANAGEMENT/SOCIAL WORK - PATIENT PORTAL LINK FT
You can access the FollowMyHealth Patient Portal offered by [facility] by registering at the following website: [email or web address]. By joining [employer]’s FollowMyHealth portal, you will also be able to view your health information using other applications (apps) compatible with our system.

## 2022-07-22 NOTE — CONSULT NOTE ADULT - ASSESSMENT
67yo M with a PMH of HTN, renal tumor s/p right nephrectomy, BPH and transaminitis with biliary stricture due to mass presenting with abdominal pain and subjective fevers.     # Abdominal pain, subjective fevers - resolved. Unclear etiology. Ct with pneumobilia indicating a patent CBD stent. Possibly transient biliary obstruction?   # Suspected cholangiocarcinoma - Inconclusive pathology. would plan for repeat EUS/ERCP for biopsies and stent removal in 4-6 weeks.    Recommendations:  - No objections to discharge  - Outpatient EUS/ERCP in 4-6 weeks    Please note that the recommendations are not final until attested by an attending.    Thank you for involving us in the care of this patient. Please reach out if any further questions.    Jairo Jagn, PGY-6  Gastroenterology/Hepatology Fellow    Available on [employer] Teams  After 5PM/Weekends, please contact the on-call GI fellow: [phone]

## 2022-07-22 NOTE — PHYSICAL THERAPY INITIAL EVALUATION ADULT - ADDITIONAL COMMENTS
Pt lives in a private home with his wife, 3 steps to enter with bilateral handrails and one flight inside. Pt was independent with all functional mobility without AD prior to admission.

## 2022-07-22 NOTE — DISCHARGE NOTE PROVIDER - HOSPITAL COURSE
Patient is a 66 year old man with a PMH of HTN, renal tumor s/p right nephrectomy, BPH and transaminitis with biliary stricture due to mass concerning for cholangiocarcinoma s/p PD and CBD stent placement on 7/7 presenting with abdominal pain and fevers. The patient describes epigastric abdominal pain on palpation that began 3 days ago and resolved last night. He endorses night time fever with Tmax 103 and night sweats for two days which resolved yesterday. Pt denies pain or fever currently. Patient denies nausea, vomiting, diarrhea, constipation,  loss of appetite, changes in the color of the urine or stool. Prior to the surgery the patient experienced loss of appetite and weight loss for 2-3 weeks. Patient describes spells of dizziness that last for a week and occur annually, which was the reason he presented to the ED initially in early July.     In the ED he was afebrile, with HR 72, /73, RR 18 with SaO2 99% on RA. Pt was given 1 dose of metronidazole and cefepime. CT of the abdomen and pelvis showed the stents remained in placed and there were no abscesses, fluid collections, or other acute pathologies.  Patient was admitted and monitored overnight. The patient remained afebrile and without pain overnight and was seen by the GI service, who agreed that the patient was able to go home and follow up with GI outpatient in 4-6 weeks for ERCP with stent removal and repeat biopsies

## 2022-07-22 NOTE — DISCHARGE NOTE PROVIDER - NSDCCPCAREPLAN_GEN_ALL_CORE_FT
PRINCIPAL DISCHARGE DIAGNOSIS  Diagnosis: Abdominal pain  Assessment and Plan of Treatment:        PRINCIPAL DISCHARGE DIAGNOSIS  Diagnosis: Intermittent epigastric abdominal pain  Assessment and Plan of Treatment: You presented to the ED because you had 2 nights of abdominal pain and fevers after your procedure to place stents in your bile duct due to narrowing. You did not spike a fever during your stay at the hospital and had mininal abdominal pain. Your labs showed that you did not have high white blood cells. You had a CT scan of your abdomen that showed the sten was still in place and there were no signs of infection. You should follow up with Dr. Burnett in 4 to 6 weeks so they can remove the stent and take another biopsy.       PRINCIPAL DISCHARGE DIAGNOSIS  Diagnosis: Intermittent epigastric abdominal pain  Assessment and Plan of Treatment: You presented to the ED because you had 2 nights of abdominal pain and fevers after your procedure to place stents in your bile duct due to narrowing. You did not spike a fever during your stay at the hospital and had mininal abdominal pain. Your labs showed that you did not have high white blood cells. You had a CT scan of your abdomen that showed the stent was still in place and there were no signs of infection. You should follow up with Dr. Burnett in 4 to 6 weeks so they can remove the stent and take another biopsy.

## 2022-07-22 NOTE — PHYSICAL THERAPY INITIAL EVALUATION ADULT - PRECAUTIONS/LIMITATIONS, REHAB EVAL
7/21 Chest Xray- clear lungs. CT Abd and Pelvis- No acute abdominal or pelvic pathology. 7/21 Chest Xray- clear lungs. CT Abd and Pelvis- No acute abdominal or pelvic pathology./no known precautions/limitations

## 2022-07-22 NOTE — CONSULT NOTE ADULT - ASSESSMENT
Patient seen and examined, agree with above assessment and plan as transcribed above.    - BP acceptable  - optimized from a cardiac prospective     Misbah Goldstein MD, [facility]  BEEPER [phone]

## 2022-07-22 NOTE — PROGRESS NOTE ADULT - PROBLEM SELECTOR PLAN 2
Pt has biliary stricture 2/2 mass s/p PD and CBD stenting on 7/7 which is concerning for cholangiocarcinoma. Pt's history of prior weight loss is also concerning for malignancy.   - f/u results of the biopsy  -trend alk phos Pt has biliary stricture 2/2 mass s/p PD and CBD stenting on 7/7 which is concerning for cholangiocarcinoma. Pt's history of prior weight loss is also concerning for malignancy.   - f/u results of the biopsy-> inconclusive; pt will have another biopsy in 4-6 weeks for workup  -trend alk phos

## 2022-07-22 NOTE — PROGRESS NOTE ADULT - ATTENDING COMMENTS
66 year old male with a PMH of HTN, BPH, renal tumor s/p right nephrectomy, and biliary stricutres concerning for cholangiocarcinoma s/p PD and CBD stent placement on 7/7 presenting with epigastric abdominal pain    Epigastric pain s/p CBD stent for stricture due to suspected Cholangiocarcinoma  Septic workup negative, afebrile  labs pristine  d/w GI - can dc home to follow up outpt for repeat ERCP  Pt appears well, NAD and no symptoms.   45 minutes spent on dc planning

## 2022-07-22 NOTE — DISCHARGE NOTE PROVIDER - CARE PROVIDER_API CALL
Rogelio Burnett)  Gastroenterology; Internal Medicine  Division of Gastroenterology - [facility], [address] [place] [address]  Phone: [phone]  Fax: [phone]  Established Patient  Follow Up Time: 1 month

## 2022-07-22 NOTE — CONSULT NOTE ADULT - SUBJECTIVE AND OBJECTIVE BOX
HISTORY OF PRESENT ILLNESS: HPI:    Patient is a 66 year old man with a PMH of HTN, renal tumor s/p right nephrectomy, BPH and transaminitis with biliary stricture due to mass concerning for cholangiocarcinoma s/p PD and CBD stent placement on 7/7 presenting with abdominal pain and subjective fevers. The patient describes epigastric abdominal pain on palpation that began 3 days ago and resolved last night. He also endorses subjective fever and night sweats at night for two nights which resolved yesterday. Pt denies pain or fever currently. Patient denies nausea, vomiting, diarrhea, constipation,  loss of appetite, changes in the color of the urine or stool. Prior to the surgery the patient experienced loss of appetite and weight loss for 2-3 weeks. Patient describes spells of dizziness that last for a week and occur annually, which was the reason he presented to the ED initially in early July.     He reports his pain is resolved, denies SOB or palps.    PAST MEDICAL & SURGICAL HISTORY:  Hypertension      BPH (benign prostatic hyperplasia)      Biliary stricture      Dizziness of unknown etiology      S/P hemorrhoidectomy  10 years ago      S/p nephrectomy      MEDICATIONS  (STANDING):  amLODIPine   Tablet 10 milliGRAM(s) Oral daily  enoxaparin Injectable 40 milliGRAM(s) SubCutaneous every 24 hours  oxybutynin 10 milliGRAM(s) Oral two times a day  tamsulosin 0.4 milliGRAM(s) Oral at bedtime      Allergies  No Known Allergies      FAMILY HISTORY:  No pertinent family history in first degree relatives  Non-contributary for premature coronary disease or sudden cardiac death    SOCIAL HISTORY:    [ x] Non-smoker  [ ] Smoker  [ ] Alcohol    FLU VACCINE THIS YEAR STARTS IN AUGUST:  [ ] Yes    [ ] No    IF OVER 65 HAVE YOU EVER HAD A PNA VACCINE:  [ ] Yes    [ ] No       [ ] N/A      REVIEW OF SYSTEMS:  [ ]chest pain  [  ]shortness of breath  [  ]palpitations  [  ]syncope  [ ]near syncope [ ]upper extremity weakness   [ ] lower extremity weakness  [  ]diplopia  [  ]altered mental status   [  ]fevers  [ ]chills [ ]nausea  [ ]vomiting  [  ]dysphagia    [x ]abdominal pain  [ ]melena  [ ]BRBPR    [  ]epistaxis  [  ]rash    [ ]lower extremity edema        [x ] All others negative	  [ ] Unable to obtain      LABS:	 	               12.9   4.51  )-----------( 199      ( 22 Jul 2022 07:07 )             40.4     Hb Trend: 12.9<--    07-22    139  |  102  |  18  ----------------------------<  101<H>  4.1   |  27  |  1.21    Ca    8.6      22 Jul 2022 06:52    TPro  6.9  /  Alb  3.3  /  TBili  0.6  /  DBili  x   /  AST  17  /  ALT  98<H>  /  AlkPhos  304<H>  07-22    Creatinine Trend: 1.21<--, 1.23<--, 1.66<--, 1.23<--, 1.39<--, 1.48<--    Coags:  PT/INR - ( 22 Jul 2022 07:07 )   PT: 11.8 sec;   INR: 1.03 ratio      PTT - ( 22 Jul 2022 07:07 )  PTT:29.4 sec      PHYSICAL EXAM:  T(C): 36.6 (07-22-22 @ 04:26), Max: 37.1 (07-21-22 @ 19:14)  HR: 73 (07-22-22 @ 04:26) (64 - 75)  BP: 124/73 (07-22-22 @ 04:26) (107/55 - 137/70)  RR: 18 (07-22-22 @ 04:26) (18 - 20)  SpO2: 95% (07-22-22 @ 04:26) (95% - 97%)  Wt(kg): --   BMI (kg/m2): 21.9 (07-21-22 @ 08:33)    Gen: Appears well in NAD  HEENT:  (-)icterus (-)pallor  CV: N S1 S2 1/6 JENNIFER (+)2 Pulses B/l  Resp:  Clear to ausculatation B/L, normal effort  GI: (+) BS Soft, NT, ND  Lymph:  (-)Edema, (-)obvious lymphadenopathy  Skin: Warm to touch, Normal turgor  Psych: Appropriate mood and affect    TELEMETRY: 	n/a      ECG:  	n/a    < from: Transthoracic Echocardiogram (07.05.22 @ 09:26) >  Conclusions:  1. Calcified trileaflet aortic valve with normal opening.  2. Normal left atrium.  LA volume index = 16 cc/m2.  3. Normal left ventricular internal dimensions and wall  thicknesses.  4. Normal left ventricular systolic function. No segmental  wall motion abnormalities.  5. Normal diastolic function  6. Normal right ventricular size and function.  7. Estimated right ventricular systolic pressure equals 24  mm Hg, assuming right atrial pressure equals 3 mm Hg,  consistent with normal pulmonary pressures.  8. Normal pericardium with trace pericardial effusion.  ------------------------------------------------------------------------  Confirmed on  7/5/2022 - 13:42:22 by LACI Cheng    < end of copied text >        ASSESSMENT/PLAN: 	 66 year old man with a PMH of HTN, renal tumor s/p right nephrectomy, BPH and transaminitis with biliary stricture due to mass concerning for cholangiocarcinoma s/p PD and CBD stent placement on 7/7 presenting with abdominal pain and subjective fevers.    --f/u GI, F/u cytopathology\  --BP stable on Norvasc  --Recent Echo with structurally normal heart  --optimized from CV perspective if endoscopic procedures planned  --F/U with Dr Ball after DC  Thank you for allowing us to participate in the care of our mutual patient.  Please do not hesitate to call with any questions.     Kaylah CASTRO  [phone]           
Chief Complaint:  Patient is a 66y old  Male who presents with a chief complaint of abdominal pain & fever (2022 11:50)      HPI:  Mr. Esteves is a 65yo M with a PMH of HTN, renal tumor s/p right nephrectomy, BPH and transaminitis with biliary stricture due to mass presenting with abdominal pain and subjective fevers.     Patient recently underwent () an ERCP for a biliary mass concerning for cholangiocarcinoma. ERCP revealed a high-grade mid CBD stricture concerning for malignancy s/p brushing/biopsy and placement of a plastic biliary and pancreatic duct stent. Brushing was negative for malignant cells.    Patient was doing well post procedure until 4 days prior to admission ,when he noticed some mild epigastric discomfort, not related to meals or movement, associated with subjective fevers and chills. Tmax 100.3. Currently with no further abdominal pain or fever. Patient denies nausea, vomiting, diarrhea, constipation, loss of appetite, changes in the color of the urine or stool.    In the ED: afebrile, with HR 72, /73, RR 18 with SaO2 99% on RA. Pt was given 1 dose of metronidazole and cefepime. CT abdomen done.     Allergies:  No Known Allergies      Home Medications:  omeprazole 40 mg oral delayed release capsule: 1 cap(s) orally once a day (2022 18:09)  solifenacin 10 mg oral tablet: 1 tab(s) orally once a day (2022 18:09)    Hospital Medications:  amLODIPine   Tablet 10 milliGRAM(s) Oral daily  enoxaparin Injectable 40 milliGRAM(s) SubCutaneous every 24 hours  melatonin 3 milliGRAM(s) Oral at bedtime PRN  oxybutynin 10 milliGRAM(s) Oral two times a day  tamsulosin 0.4 milliGRAM(s) Oral at bedtime      PMHX/PSHX:  Hypertension    BPH (benign prostatic hyperplasia)    Biliary stricture    Dizziness of unknown etiology    S/P hemorrhoidectomy    S/p nephrectomy        Family history:  No pertinent family history in first degree relatives    No pertinent family history in first degree relatives        Denies family history of colon cancer/polyps, stomach cancer/polyps, pancreatic cancer/masses, liver cancer/disease, ovarian cancer and endometrial cancer.    Social History:     Tob: Denies  EtOH: As above  Illicit Drugs: Denies    ROS:   General:  No wt loss, fevers, chills, night sweats, fatigue  Eyes:  Good vision, no reported pain  ENT:  No sore throat, pain, runny nose, dysphagia  CV:  No pain, palpitations, hypo/hypertension  Pulm:  No dyspnea, cough, tachypnea, wheezing  GI:  As per HPI  :  No pain, bleeding, incontinence, nocturia  Muscle:  No pain, weakness  Neuro:  No weakness, tingling, memory problems  Psych:  No fatigue, insomnia, mood problems, depression  Endocrine:  No polyuria, polydipsia, cold/heat intolerance  Heme:  No petechiae, ecchymosis, easy bruisability  Skin:  No rash, tattoos, scars, edema    PHYSICAL EXAM:   GENERAL:  No acute distress  HEENT:  Normocephalic/atraumatic, no scleral icterus  CHEST:  No accessory muscle use  HEART:  Regular rate and rhythm  ABDOMEN:  Soft, non-tender, non-distended  EXTREMITIES: No cyanosis, clubbing, or edema  SKIN:  No rash  NEURO:  Alert and oriented x 3, no asterixis    Vital Signs:  Vital Signs Last 24 Hrs  T(C): 36.8 (2022 12:00), Max: 37.1 (2022 19:14)  T(F): 98.3 (2022 12:00), Max: 98.7 (2022 19:14)  HR: 75 (2022 12:00) (64 - 75)  BP: 125/67 (2022 12:00) (107/55 - 137/70)  BP(mean): --  RR: 18 (2022 12:00) (18 - 20)  SpO2: 99% (2022 12:00) (95% - 99%)    Parameters below as of 2022 12:00  Patient On (Oxygen Delivery Method): room air      Daily     Daily     LABS:                        12.9   4.51  )-----------( 199      ( 2022 07:07 )             40.4     Mean Cell Volume: 92.0 fl (22 @ 07:07)        139  |  102  |  18  ----------------------------<  101<H>  4.1   |  27  |  1.21    Ca    8.6      2022 06:52    TPro  6.9  /  Alb  3.3  /  TBili  0.6  /  DBili  x   /  AST  17  /  ALT  98<H>  /  AlkPhos  304<H>  -    LIVER FUNCTIONS - ( 2022 06:52 )  Alb: 3.3 g/dL / Pro: 6.9 g/dL / ALK PHOS: 304 U/L / ALT: 98 U/L / AST: 17 U/L / GGT: x           PT/INR - ( 2022 07:07 )   PT: 11.8 sec;   INR: 1.03 ratio         PTT - ( 2022 07:07 )  PTT:29.4 sec  Urinalysis Basic - ( 2022 11:59 )    Color: Light Yellow / Appearance: Clear / S.033 / pH: x  Gluc: x / Ketone: Negative  / Bili: Negative / Urobili: Negative   Blood: x / Protein: Trace / Nitrite: Negative   Leuk Esterase: Negative / RBC: 6 /hpf / WBC 0 /HPF   Sq Epi: x / Non Sq Epi: 0 /hpf / Bacteria: Negative                              12.9   4.51  )-----------( 199      ( 2022 07:07 )             40.4                         12.4   6.11  )-----------( 186      ( 2022 09:23 )             38.7       Imaging:    ACC: [id number] EXAM:  CT ABDOMEN AND PELVIS IC                          PROCEDURE DATE:  2022          INTERPRETATION:  CLINICAL INFORMATION: Biliary stricture status post   stent placement 10 days ago presenting with abdominal pain    COMPARISON: MRCP 2022 and CT abdomen/pelvis 2018    CONTRAST/COMPLICATIONS:  IV Contrast: Omnipaque 350  90 cc administered   10 cc discarded  Oral Contrast: NONE  Complications: None reported at time of study completion    PROCEDURE:  CT of the Abdomen and Pelvis was performed.  Sagittal and coronal reformats were performed.    FINDINGS:  LOWER CHEST: Bibasilar linear atelectasis.    LIVER: Right hepatic lobe cyst and scattered subcentimeter hypodense   lesions too small to characterize.  BILEDUCTS: Pneumobilia. Mild intrahepatic ductal dilatation. CBD stent   in place. Previously described mass encasing the midportion of the CBD is   not well visualized.  GALLBLADDER: Contracted. Mild gallbladder wall thickening versus   underdistention.  SPLEEN: Within normal limits.  PANCREAS: Within normal limits.  ADRENALS: Within normal limits.  KIDNEYS/URETERS: Right nephrectomy. Left kidney is unremarkable.    BLADDER: Within normal limits.  REPRODUCTIVE ORGANS: Prostate is enlarged.    BOWEL:No bowel obstruction. Appendix is normal.  PERITONEUM: No ascites.  VESSELS: Atherosclerotic changes.  RETROPERITONEUM/LYMPH NODES: No lymphadenopathy.  ABDOMINAL WALL: Within normal limits.  BONES: Degenerative changes.    IMPRESSION:  No acute abdominal or pelvic pathology.    Previously described mass encasing the mid midportion of the CBD is not   well visualized. CBD stent in place. Pneumobilia. Mild intrahepatic   ductal dilatation.    --- End of Report ---

## 2022-07-22 NOTE — PHYSICAL THERAPY INITIAL EVALUATION ADULT - PERTINENT HX OF CURRENT PROBLEM, REHAB EVAL
Pt is a 66 year old male with a PMH of HTN, BPH, renal tumor s/p right nephrectomy, and biliary stricutres concerning for cholangiocarcinoma s/p PD and CBD stent placement on 7/7 presenting with epigastric abdominal pain and subjective night time fevers. Abdominal pain and fevers is concerning for cholangitis vs, stent occlusion vs. cholangiocarcinoma. The patient has no leukocytosis, fevers, or jaundice and CT was unchanged from 7/7 making acute cholangitis less likely.

## 2022-07-22 NOTE — CONSULT NOTE ADULT - ATTENDING COMMENTS
As above  Discussed with GI fellow this morning (7/22)  Pt attempted to be seen by me in the evening of 7/22 but pt already discharged.    Pt with common bile duct mass/stricture, indeterminant after EUS/ERCP/cytology  Resolved fever and resolved abdominal pain, which may have been due to transient bacteremia during endoscopic procedures.    Agree with outpatient GI procedural followup

## 2022-07-24 ENCOUNTER — APPOINTMENT (OUTPATIENT)
Dept: RADIOLOGY | Facility: IMAGING CENTER | Age: 67
End: 2022-07-24

## 2022-07-24 PROBLEM — R42 DIZZINESS AND GIDDINESS: Chronic | Status: ACTIVE | Noted: 2022-07-21

## 2022-07-24 PROBLEM — K83.1 OBSTRUCTION OF BILE DUCT: Chronic | Status: ACTIVE | Noted: 2022-07-21

## 2022-08-11 ENCOUNTER — APPOINTMENT (OUTPATIENT)
Dept: GASTROENTEROLOGY | Facility: CLINIC | Age: 67
End: 2022-08-11

## 2022-08-11 VITALS
WEIGHT: 128 LBS | OXYGEN SATURATION: 98 % | HEIGHT: 69 IN | HEART RATE: 72 BPM | BODY MASS INDEX: 18.96 KG/M2 | SYSTOLIC BLOOD PRESSURE: 125 MMHG | DIASTOLIC BLOOD PRESSURE: 80 MMHG | TEMPERATURE: 98 F

## 2022-08-11 DIAGNOSIS — Z78.9 OTHER SPECIFIED HEALTH STATUS: ICD-10-CM

## 2022-08-11 DIAGNOSIS — E11.9 TYPE 2 DIABETES MELLITUS W/OUT COMPLICATIONS: ICD-10-CM

## 2022-08-11 DIAGNOSIS — I10 ESSENTIAL (PRIMARY) HYPERTENSION: ICD-10-CM

## 2022-08-11 PROCEDURE — 99214 OFFICE O/P EST MOD 30 MIN: CPT

## 2022-08-19 DIAGNOSIS — Z01.812 ENCOUNTER FOR PREPROCEDURAL LABORATORY EXAMINATION: ICD-10-CM

## 2022-08-19 DIAGNOSIS — Z46.89 ENCOUNTER FOR FITTING AND ADJUSTMENT OF OTHER SPECIFIED DEVICES: ICD-10-CM

## 2022-08-21 PROBLEM — E11.9 DIABETES MELLITUS: Status: ACTIVE | Noted: 2022-08-21

## 2022-08-21 PROBLEM — I10 HYPERTENSION, UNSPECIFIED TYPE: Status: ACTIVE | Noted: 2022-08-21

## 2022-08-21 PROBLEM — Z78.9 DENIES ALCOHOL CONSUMPTION: Status: ACTIVE | Noted: 2022-08-21

## 2022-08-21 RX ORDER — AMLODIPINE BESYLATE 5 MG/1
5 TABLET ORAL DAILY
Refills: 0 | Status: ACTIVE | COMMUNITY
Start: 2022-08-21

## 2022-08-21 RX ORDER — SITAGLIPTIN 50 MG/1
50 TABLET, FILM COATED ORAL DAILY
Refills: 0 | Status: ACTIVE | COMMUNITY
Start: 2022-08-21

## 2022-08-21 NOTE — ASSESSMENT
[FreeTextEntry1] : Impression:\par \par #1.  Indeterminant mid common bile duct stricture, s/p EUS/FNB/ERCP/brush biopsy/forceps biopsy/stent placement early July 2022.  Most recent CT demonstrates bile duct stent in place and pancreatic stent no longer present. Atypical cells on FNB, there is concern for malignancy, although not yet proven, indeterminant biliary strictures are often difficult to diagnose definitively.\par \par #2.  History of renal mass s/p nephrectomy\par \par #3.  DM on Januvia.\par \par Recommendations:\par \par #1.  Repeat EGD/EUS/ERCP/stent exchange.  Add cholangioscopy and directed biopsies.\par \par Risks, benefits, alternatives of the procedure were discussed with the patient and the patient was educated about the procedure. Risks include, but are not limited to, pancreatitis, bleeding, infection, injury to internal organs, possible need for further procedures including emergency surgery, missed lesions, risk of anesthesia, and risk of IV site problems.  Patient understands and agrees to proceed.\par

## 2022-08-21 NOTE — CONSULT LETTER
[Dear  ___] : Dear  [unfilled], [Consult Letter:] : I had the pleasure of evaluating your patient, [unfilled]. [Please see my note below.] : Please see my note below. [Consult Closing:] : Thank you very much for allowing me to participate in the care of this patient.  If you have any questions, please do not hesitate to contact me. [Sincerely,] : Sincerely, [FreeTextEntry3] : Rogelio Burnett MD, MPH, PREETI, ARMANDO\par Chief of Clinical Quality in Gastroenterology, [facility]\par Associate Chief of Gastroenterology, [facility]\par Interim Director of Endoscopy, [facility]\par Director of Endoscopic Ultrasound, [facility]\par [address], [address]\par [facility], [id number]\par 24 hours [phone]\par \par

## 2022-08-21 NOTE — HISTORY OF PRESENT ILLNESS
[FreeTextEntry1] : Pt with common bile duct mass/stricture, indeterminant after EUS/ERCP/cytology in early July 2022, then hospitalized for 2 days in late July with fever and abdominal pain which resolved quickly, thought due to transient bacteremia from transient stent occlusion.\par \par Asymptomatic, without fevers, chills, sweats, abdominal pain, heartburn, dysphagia, nausea, vomiting, constipation, diarrhea, melena, hematochezia, jaundice or weight loss.\par \par \par MRIMRCP July 6 2022:\par LIVER: Normal morphology. No significant steatosis. A few scattered cysts \par measuring up to 1 cm in the posterior right lobe. No suspicious enhancing \par lesions.\par BILE DUCTS: A 2.6 x 2.2 cm enhancing T2 intermediate signal mass versus \par lymphadenopathy encasing the mid common bile duct with resultant 1.1 cm \par long stricture at that level. Periductal enhancement extends superiorly \par to involve the more proximal CBD and the central left intrahepatic \par biliary tree. Upstream of the CBD stricture, there is mild intra and \par extrahepatic biliary duct dilatation. Question intrahepatic ductal \par irregularity versus motion artifact.\par GALLBLADDER: Contracted and filled with sludge.\par SPLEEN: Within normal limits.\par PANCREAS: Within normal limits.\par ADRENALS: Right adrenal gland is not well visualized. Normal left adrenal \par gland.\par KIDNEYS/URETERS: Right nephrectomy. Normal left kidney.\par \par VISUALIZED PORTIONS:\par BOWEL: Moderate stool burden throughout the colon.\par PERITONEUM: No ascites.\par VESSELS: Atherosclerotic changes. Left hepatic artery replaced to the \par left gastric artery.\par RETROPERITONEUM/LYMPH NODES: A CBD mass versus periportal lymphadenopathy \par with extrinsic compression of the mid CBD.\par ABDOMINAL WALL: Within normal limits.\par BONES: Degenerative changes.\par \par IMPRESSION:\par Motion limited study.\par \par A 2.6 cm mass encasing the mid CBD resulting in a 1.1 cm long stricture \par and upstream biliary ductal dilatation, suspicious for malignancy. \par Additionally, question intrahepatic biliary ductal \par irregularity/strictures versus artifact. Consider primary biliary \par malignancy or extrinsic compression by abnormal lymphadenopathy. \par Differential also includes inflammatory conditions such as IgG4 \par sclerosing cholangitis\par \par EUS/FNB: 7/7/22\par Findings:\par      ENDOSCOPIC FINDING: :\par      A hiatal hernia was present.\par      The exam of the esophagus was otherwise normal.\par      Multiple dispersed, non-bleeding erosions were found in the gastric antrum. There were no \par      stigmata of recent bleeding. Biopsies were taken with a cold forceps for Helicobacter pylori \par      testing.\par      The examined duodenum was normal.\par      ENDOSONOGRAPHIC FINDING: :\par      The esophagus, stomach and duodenum and adjacent structures were visualized \par      endosonographically.\par      There was a suggestion of a mass causing a stricture in the middle third of the main bile \par      duct. The origin appeared to be from within the wall of the bile duct and involved the taked \par      off of the cystic duct which was dilated. The gallbladder was contracted and not well \par      visualized. There was biliary ductal dilation proximal to the stricture. Fine needle biopsy \par      for cytology was performed. Four passes were made with the 25 gauge [facility] needle using \par      a transduodenal approach. A stylet was used. A cytologist was present and performed a \par      preliminary cytologic examination. The cellularity of the specimen was adequate. Final \par      cytology results are pending.\par      Pancreatic parenchymal abnormalities were noted in the entire pancreas. These consisted of \par      lobularity.\par      One enlarged lymph node was visualized in the perihilar region. The node was round, \par      hypoechoic and had well defined margins.\par                                                                                                     \par Impression:          - Intrinsic mass within the mid bile duct with biliary ductal dilation \par                      proximal to the stricture s/p FNB.\par \par ERCP: 7/7/22\par Findings:\par      The  film was normal. The esophagus was successfully intubated under direct vision. The \par      scope was advanced to a normal major papilla in the descending duodenum without detailed \par      examination of the pharynx, larynx and associated structures, and upper GI tract as an EGD \par      was performed earlier today. The major papilla was normal. Initial attempts at biliary \par      cannulation with a 0.025 inch Jagwire over a sphincterotome resulted in passage of the wire \par      into the ventral pancreatic duct. Contrast was injected showing normal pancreatic ductal \par      anatomy and the wire was left in place. A secone 0.025 inch wire was then used to attempt \par      deep biliary cannulation but was unsuccesful. The decision to perfom a pre-cut sphincterotomy \par      was made and succesful deep biliary access was obtained. There was evidence of a high-grade \par      stricture in the middle third of the bile duct with proximal biliary ductal dilation of the \par      intra and extrahepatic ducts. Cells for cytology were obtained by brushing in the middle \par      third of the main bile duct and pediatric forceps were passed under endoscopic and \par      fluoroscopic guidance for biopsies. One 5 Fr by 3 cm single pigtail plastic stent was placed \par      into the ventral pancreatic duct for post-ERCP prophylaxis. Clear fluid flowed through the \par      stent. The stent was in good position. One 10 Fr by 7 cm Advanix plastic stent was placed \par      into the common bile duct. Bile flowed through the stent. The stent was in good position.\par                                                                                                     \par Impression:          - ERCP revealed a high-grade mid CBD stricture concerning for malignancy s/p \par                      brushing/biopsy and placement of a plastic biliary and pancreatic duct stent.\par \par \par Cytology:\par Final Diagnosis\par BILE DUCT, EUS GUIDED FNA\par ATYPICAL FINDINGS.\par \par Cytology slides show ductal cells with nuclear atypia, some in papillary-\par like structures, in a background of bland glandular epithelium and stromal\par \par fragments with crushed lymphocytes. Cell block consists of scant benign-\par appearing glandular epithelium and rare stroma with chronic inflammation;\par additional levels were obtained and examined. No high-grade dysplasia, no\par necrosis or mitotic figures seen. Although there are no overt features of\par malignancy, the cytologic and architectural atypia raise the possibility\par of a neoplastic process.\par \par Cytology:\par 1. COMMON BILE DUCT WASH\par NEGATIVE FOR MALIGNANT CELLS.\par Cytology slide and cell block consists of    benign ductal epithelial\par cells.\par \par \par 2. COMMON BILE DUCT BRUSH\par NEGATIVE FOR MALIGNANT CELLS.\par Benign ductal epithelial cells.\par \par Pathology:\par 1. Gastric, biopsy\par - Mild chronic inactive gastritis.\par - Negative for intestinal metaplasia.\par - No morphologic evidence of H. pylori.\par - Negative for dysplasia and malignancy.\par \par 2. Bile duct, biopsy\par - Few superficial fragments of benign biliary epithelium.\par - Minimal underlying stroma and blood clot\par \par CT abd/pelvis: 7/21/22\par LOWER CHEST: Bibasilar linear atelectasis.\par \par LIVER: Right hepatic lobe cyst and scattered subcentimeter hypodense \par lesions too small to characterize.\par BILE DUCTS: Pneumobilia. Mild intrahepatic ductal dilatation. CBD stent \par in place. Previously described mass encasing the midportion of the CBD is \par not well visualized.\par GALLBLADDER: Contracted. Mild gallbladder wall thickening versus \par underdistention.\par SPLEEN: Within normal limits.\par PANCREAS: Within normal limits.\par ADRENALS: Within normal limits.\par KIDNEYS/URETERS: Right nephrectomy. Left kidney is unremarkable.\par \par BLADDER: Within normal limits.\par REPRODUCTIVE ORGANS: Prostate is enlarged.\par \par BOWEL: No bowel obstruction. Appendix is normal.\par PERITONEUM: No ascites.\par VESSELS: Atherosclerotic changes.\par RETROPERITONEUM/LYMPH NODES: No lymphadenopathy.\par ABDOMINAL WALL: Within normal limits.\par BONES: Degenerative changes.\par \par IMPRESSION:\par No acute abdominal or pelvic pathology.\par \par Previously described mass encasing the mid midportion of the CBD is not \par well visualized. CBD stent in place. Pneumobilia. Mild intrahepatic \par ductal dilatation.\par

## 2022-09-03 ENCOUNTER — OUTPATIENT (OUTPATIENT)
Dept: OUTPATIENT SERVICES | Facility: HOSPITAL | Age: 67
LOS: 1 days | End: 2022-09-03
Payer: COMMERCIAL

## 2022-09-03 ENCOUNTER — APPOINTMENT (OUTPATIENT)
Dept: RADIOLOGY | Facility: IMAGING CENTER | Age: 67
End: 2022-09-03

## 2022-09-03 DIAGNOSIS — Z96.89 PRESENCE OF OTHER SPECIFIED FUNCTIONAL IMPLANTS: ICD-10-CM

## 2022-09-03 DIAGNOSIS — Z90.5 ACQUIRED ABSENCE OF KIDNEY: Chronic | ICD-10-CM

## 2022-09-03 DIAGNOSIS — Z98.89 OTHER SPECIFIED POSTPROCEDURAL STATES: Chronic | ICD-10-CM

## 2022-09-03 DIAGNOSIS — Z00.8 ENCOUNTER FOR OTHER GENERAL EXAMINATION: ICD-10-CM

## 2022-09-03 PROCEDURE — 74018 RADEX ABDOMEN 1 VIEW: CPT | Mod: 26

## 2022-09-03 PROCEDURE — 74018 RADEX ABDOMEN 1 VIEW: CPT

## 2022-09-26 ENCOUNTER — OUTPATIENT (OUTPATIENT)
Dept: OUTPATIENT SERVICES | Facility: HOSPITAL | Age: 67
LOS: 1 days | End: 2022-09-26
Payer: COMMERCIAL

## 2022-09-26 VITALS
HEART RATE: 69 BPM | RESPIRATION RATE: 14 BRPM | HEIGHT: 69 IN | DIASTOLIC BLOOD PRESSURE: 84 MMHG | OXYGEN SATURATION: 99 % | TEMPERATURE: 99 F | WEIGHT: 127.43 LBS | SYSTOLIC BLOOD PRESSURE: 136 MMHG

## 2022-09-26 DIAGNOSIS — Z90.5 ACQUIRED ABSENCE OF KIDNEY: Chronic | ICD-10-CM

## 2022-09-26 DIAGNOSIS — Z98.89 OTHER SPECIFIED POSTPROCEDURAL STATES: Chronic | ICD-10-CM

## 2022-09-26 DIAGNOSIS — Z01.818 ENCOUNTER FOR OTHER PREPROCEDURAL EXAMINATION: ICD-10-CM

## 2022-09-26 DIAGNOSIS — K83.1 OBSTRUCTION OF BILE DUCT: ICD-10-CM

## 2022-09-26 DIAGNOSIS — Z46.89 ENCOUNTER FOR FITTING AND ADJUSTMENT OF OTHER SPECIFIED DEVICES: ICD-10-CM

## 2022-09-26 LAB
ALBUMIN SERPL ELPH-MCNC: 4.1 G/DL — SIGNIFICANT CHANGE UP (ref 3.3–5)
ALP SERPL-CCNC: 346 U/L — HIGH (ref 40–120)
ALT FLD-CCNC: 36 U/L — SIGNIFICANT CHANGE UP (ref 10–45)
ANION GAP SERPL CALC-SCNC: 9 MMOL/L — SIGNIFICANT CHANGE UP (ref 5–17)
AST SERPL-CCNC: 21 U/L — SIGNIFICANT CHANGE UP (ref 10–40)
BILIRUB SERPL-MCNC: 0.6 MG/DL — SIGNIFICANT CHANGE UP (ref 0.2–1.2)
BUN SERPL-MCNC: 17 MG/DL — SIGNIFICANT CHANGE UP (ref 7–23)
CALCIUM SERPL-MCNC: 9.1 MG/DL — SIGNIFICANT CHANGE UP (ref 8.4–10.5)
CHLORIDE SERPL-SCNC: 101 MMOL/L — SIGNIFICANT CHANGE UP (ref 96–108)
CO2 SERPL-SCNC: 29 MMOL/L — SIGNIFICANT CHANGE UP (ref 22–31)
CREAT SERPL-MCNC: 1.3 MG/DL — SIGNIFICANT CHANGE UP (ref 0.5–1.3)
EGFR: 59 ML/MIN/1.73M2 — LOW
GLUCOSE SERPL-MCNC: 141 MG/DL — HIGH (ref 70–99)
HCT VFR BLD CALC: 43.5 % — SIGNIFICANT CHANGE UP (ref 39–50)
HGB BLD-MCNC: 13.9 G/DL — SIGNIFICANT CHANGE UP (ref 13–17)
MCHC RBC-ENTMCNC: 27.5 PG — SIGNIFICANT CHANGE UP (ref 27–34)
MCHC RBC-ENTMCNC: 32 GM/DL — SIGNIFICANT CHANGE UP (ref 32–36)
MCV RBC AUTO: 86 FL — SIGNIFICANT CHANGE UP (ref 80–100)
NRBC # BLD: 0 /100 WBCS — SIGNIFICANT CHANGE UP (ref 0–0)
PLATELET # BLD AUTO: 210 K/UL — SIGNIFICANT CHANGE UP (ref 150–400)
POTASSIUM SERPL-MCNC: 3.9 MMOL/L — SIGNIFICANT CHANGE UP (ref 3.5–5.3)
POTASSIUM SERPL-SCNC: 3.9 MMOL/L — SIGNIFICANT CHANGE UP (ref 3.5–5.3)
PROT SERPL-MCNC: 8 G/DL — SIGNIFICANT CHANGE UP (ref 6–8.3)
RBC # BLD: 5.06 M/UL — SIGNIFICANT CHANGE UP (ref 4.2–5.8)
RBC # FLD: 15.4 % — HIGH (ref 10.3–14.5)
SODIUM SERPL-SCNC: 139 MMOL/L — SIGNIFICANT CHANGE UP (ref 135–145)
WBC # BLD: 4.95 K/UL — SIGNIFICANT CHANGE UP (ref 3.8–10.5)
WBC # FLD AUTO: 4.95 K/UL — SIGNIFICANT CHANGE UP (ref 3.8–10.5)

## 2022-09-26 PROCEDURE — 80053 COMPREHEN METABOLIC PANEL: CPT

## 2022-09-26 PROCEDURE — 83036 HEMOGLOBIN GLYCOSYLATED A1C: CPT

## 2022-09-26 PROCEDURE — 85027 COMPLETE CBC AUTOMATED: CPT

## 2022-09-26 PROCEDURE — G0463: CPT

## 2022-09-26 RX ORDER — SOLIFENACIN SUCCINATE 10 MG/1
1 TABLET ORAL
Qty: 0 | Refills: 0 | DISCHARGE

## 2022-09-26 NOTE — H&P PST ADULT - HISTORY OF PRESENT ILLNESS
66 y/o M with a PMHx of HTN, DM, renal tumor s/p right nephrectomy 4 years ago, BPH. Hx of common bile duct mass/stricture, indeterminant after EUS/ERCP/cytology in early July 2022, then hospitalized for 2 days in late July with fever and abdominal pain which resolved quickly, thought due to transient bacteremia from transient stent occlusion.  Most recent CT demonstrates bile duct stent in place and pancreatic stent no longer present. Atypical cells on FNB, there is concern for malignancy, although not yet proven.  Now scheduled for repeat EGD/EUS/ERCP/stent exchange, Add cholangioscopy and directed biopsies on 10/7/22.   Today, denies abdominal pain, fever, chills. Denies SOB, CP, palpitation.     Denies Hx of Covid-19 Infx.   Covid swab on 9/5/22

## 2022-09-26 NOTE — H&P PST ADULT - MUSCULOSKELETAL
negative ROM intact/no joint swelling/no joint erythema/no joint warmth/no calf tenderness/normal gait/strength 5/5 bilateral upper extremities/strength 5/5 bilateral lower extremities

## 2022-09-26 NOTE — H&P PST ADULT - NSICDXPASTMEDICALHX_GEN_ALL_CORE_FT
PAST MEDICAL HISTORY:  Biliary stricture     BPH (benign prostatic hyperplasia)     Dizziness of unknown etiology     Hypertension

## 2022-09-27 LAB
A1C WITH ESTIMATED AVERAGE GLUCOSE RESULT: 6.1 % — HIGH (ref 4–5.6)
ESTIMATED AVERAGE GLUCOSE: 128 MG/DL — HIGH (ref 68–114)

## 2022-10-07 ENCOUNTER — TRANSCRIPTION ENCOUNTER (OUTPATIENT)
Age: 67
End: 2022-10-07

## 2022-10-07 ENCOUNTER — OUTPATIENT (OUTPATIENT)
Dept: OUTPATIENT SERVICES | Facility: HOSPITAL | Age: 67
LOS: 1 days | End: 2022-10-07
Payer: COMMERCIAL

## 2022-10-07 ENCOUNTER — APPOINTMENT (OUTPATIENT)
Dept: GASTROENTEROLOGY | Facility: HOSPITAL | Age: 67
End: 2022-10-07

## 2022-10-07 ENCOUNTER — RESULT REVIEW (OUTPATIENT)
Age: 67
End: 2022-10-07

## 2022-10-07 VITALS
HEART RATE: 71 BPM | OXYGEN SATURATION: 99 % | SYSTOLIC BLOOD PRESSURE: 146 MMHG | DIASTOLIC BLOOD PRESSURE: 81 MMHG | RESPIRATION RATE: 15 BRPM

## 2022-10-07 VITALS
OXYGEN SATURATION: 99 % | DIASTOLIC BLOOD PRESSURE: 76 MMHG | HEIGHT: 69 IN | WEIGHT: 130.07 LBS | RESPIRATION RATE: 22 BRPM | SYSTOLIC BLOOD PRESSURE: 157 MMHG | HEART RATE: 76 BPM | TEMPERATURE: 98 F

## 2022-10-07 DIAGNOSIS — Z90.5 ACQUIRED ABSENCE OF KIDNEY: Chronic | ICD-10-CM

## 2022-10-07 DIAGNOSIS — Z98.89 OTHER SPECIFIED POSTPROCEDURAL STATES: Chronic | ICD-10-CM

## 2022-10-07 DIAGNOSIS — Z46.89 ENCOUNTER FOR FITTING AND ADJUSTMENT OF OTHER SPECIFIED DEVICES: ICD-10-CM

## 2022-10-07 LAB — GLUCOSE BLDC GLUCOMTR-MCNC: 89 MG/DL — SIGNIFICANT CHANGE UP (ref 70–99)

## 2022-10-07 PROCEDURE — 88112 CYTOPATH CELL ENHANCE TECH: CPT

## 2022-10-07 PROCEDURE — 74330 X-RAY BILE/PANC ENDOSCOPY: CPT

## 2022-10-07 PROCEDURE — 43262 ENDO CHOLANGIOPANCREATOGRAPH: CPT

## 2022-10-07 PROCEDURE — 43264 ERCP REMOVE DUCT CALCULI: CPT

## 2022-10-07 PROCEDURE — 43275 ERCP REMOVE FORGN BODY DUCT: CPT

## 2022-10-07 PROCEDURE — 88305 TISSUE EXAM BY PATHOLOGIST: CPT

## 2022-10-07 PROCEDURE — 88305 TISSUE EXAM BY PATHOLOGIST: CPT | Mod: 26

## 2022-10-07 PROCEDURE — C1769: CPT

## 2022-10-07 PROCEDURE — 74328 X-RAY BILE DUCT ENDOSCOPY: CPT | Mod: 26

## 2022-10-07 PROCEDURE — 88112 CYTOPATH CELL ENHANCE TECH: CPT | Mod: 26

## 2022-10-07 PROCEDURE — 82962 GLUCOSE BLOOD TEST: CPT

## 2022-10-07 DEVICE — BLLN EXTRACT FUSION QUATRO 8.5 10 12 15MM: Type: IMPLANTABLE DEVICE | Status: FUNCTIONAL

## 2022-10-07 DEVICE — CATH BLLN EXTRACT DIST GUIDE WIRE 15MM 3LUM: Type: IMPLANTABLE DEVICE | Status: FUNCTIONAL

## 2022-10-07 DEVICE — AUTOTOME CANNULATING SPHINCTEROTOME RX 44 20MM: Type: IMPLANTABLE DEVICE | Status: FUNCTIONAL

## 2022-10-07 DEVICE — HYDRATOME 44: Type: IMPLANTABLE DEVICE | Status: FUNCTIONAL

## 2022-10-07 RX ORDER — SODIUM CHLORIDE 9 MG/ML
500 INJECTION, SOLUTION INTRAVENOUS
Refills: 0 | Status: COMPLETED | OUTPATIENT
Start: 2022-10-07 | End: 2022-10-07

## 2022-10-07 RX ORDER — EMPAGLIFLOZIN 10 MG/1
1 TABLET, FILM COATED ORAL
Qty: 0 | Refills: 0 | DISCHARGE

## 2022-10-07 RX ORDER — INDOMETHACIN 50 MG
100 CAPSULE ORAL ONCE
Refills: 0 | Status: COMPLETED | OUTPATIENT
Start: 2022-10-07 | End: 2022-10-07

## 2022-10-07 RX ORDER — OMEPRAZOLE 10 MG/1
1 CAPSULE, DELAYED RELEASE ORAL
Qty: 0 | Refills: 0 | DISCHARGE

## 2022-10-07 RX ADMIN — SODIUM CHLORIDE 30 MILLILITER(S): 9 INJECTION, SOLUTION INTRAVENOUS at 10:56

## 2022-10-07 RX ADMIN — Medication 100 MILLIGRAM(S): at 12:28

## 2022-10-07 NOTE — ASU DISCHARGE PLAN (ADULT/PEDIATRIC) - NS MD DC FALL RISK RISK
For information on Fall & Injury Prevention, visit: [email or web address] OR  [email or web address] OR  https://www.cdc.gov/steadi/patient.html

## 2022-10-07 NOTE — PRE PROCEDURE NOTE - PRE PROCEDURE EVALUATION
Pre-Endoscopy Evaluation    Attending Physician:  Dr. Rogelio Burnett    Procedure: ERCP    Indication for Procedure & Pertinent History: See Allscripts chart    PAST MEDICAL & SURGICAL HISTORY:  Hypertension      BPH (benign prostatic hyperplasia)      Biliary stricture      Dizziness of unknown etiology      S/P hemorrhoidectomy  10 years ago      S/p nephrectomy  4 years ago          Allergies    No Known Allergies    Intolerances        Medications: MEDICATIONS  (STANDING):    MEDICATIONS  (PRN):      Pertinent lab data:                      Physical Examination:  Daily     Daily   Vital Signs Last 24 Hrs  T(C): --  T(F): --  HR: --  BP: --  BP(mean): --  RR: --  SpO2: --      GENERAL: no acute distress  NEURO: alert  HEENT: NCAT, no conjunctival pallor appreciated  CHEST: no respiratory distress, no accessory muscle use  CARDIAC: regular rate, +S1/S2  ABDOMEN: soft, nontender, no rebound or guarding  EXTREMITIES: warm, well perfused  SKIN: no lesions noted    Comments:    ASA Class: I []  II []  III []  IV []    The patient is a suitable candidate for the planned procedure unless box checked [ ]  No, explain:    Note incomplete until finalized by attending signature/attestation.    Joseph Woodard  GI/Hepatology Fellow    MONDAY-FRIDAY 8AM-5PM:  Pager# [phone] ([facility]) or [phone] ([facility])    NON-URGENT CONSULTS:  Please email [email or web address]  AT NIGHT AND ON WEEKENDS:  Contact on-call GI fellow via answering service ([phone]) from 5pm-8am and on weekends/holidays

## 2022-10-07 NOTE — ASU DISCHARGE PLAN (ADULT/PEDIATRIC) - NSDISCH_DIET_ENDO_ALL_CORE_FT
Attending You should resume your previous diet unless otherwise instructed by your doctor. Do not drink alcoholic beverages for the next 24 hours.
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
## 2022-10-07 NOTE — ASU PATIENT PROFILE, ADULT - FALL HARM RISK - UNIVERSAL INTERVENTIONS
Bed in lowest position, wheels locked, appropriate side rails in place/Call bell, personal items and telephone in reach/Instruct patient to call for assistance before getting out of bed or chair/Non-slip footwear when patient is out of bed/[place] to call system/Physically safe environment - no spills, clutter or unnecessary equipment/Purposeful Proactive Rounding/Room/bathroom lighting operational, light cord in reach

## 2022-10-11 LAB — NON-GYNECOLOGICAL CYTOLOGY STUDY: SIGNIFICANT CHANGE UP

## 2022-10-22 NOTE — PRE-ANESTHESIA EVALUATION ADULT - NSANTHSNORERD_ENT_A_CORE
Spinal Block    Start time: 10/22/2022 9:35 AM  End time: 10/22/2022 9:43 AM  Performed by: Jorgito Wakefield CRNA  Authorized by: Jorgito Wakefield CRNA     Pre-procedure:   Indications: at surgeon's request and primary anesthetic  Preanesthetic Checklist: patient identified, risks and benefits discussed, anesthesia consent and timeout performed      Spinal Block:   Patient Position:  Seated  Prep Region:  Lumbar  Prep: chlorhexidine      Location:  L2-3  Technique:  Single shot      Med Admin Time: 10/22/2022 9:43 AM    Needle:   Needle Type:  Pencan  Needle Gauge:  25 G  Attempts:  1      Events: CSF confirmed, no blood with aspiration and no paresthesia        Assessment:  Insertion:  Uncomplicated  Patient tolerance:  Patient tolerated the procedure well with no immediate complications No

## 2022-12-13 ENCOUNTER — APPOINTMENT (OUTPATIENT)
Dept: GASTROENTEROLOGY | Facility: CLINIC | Age: 67
End: 2022-12-13

## 2022-12-13 ENCOUNTER — NON-APPOINTMENT (OUTPATIENT)
Age: 67
End: 2022-12-13

## 2022-12-13 VITALS
BODY MASS INDEX: 20.29 KG/M2 | SYSTOLIC BLOOD PRESSURE: 157 MMHG | HEART RATE: 82 BPM | OXYGEN SATURATION: 100 % | DIASTOLIC BLOOD PRESSURE: 84 MMHG | HEIGHT: 69 IN | WEIGHT: 137 LBS

## 2022-12-13 PROCEDURE — 99214 OFFICE O/P EST MOD 30 MIN: CPT

## 2022-12-19 LAB
ALBUMIN SERPL ELPH-MCNC: 4.6 G/DL
ALP BLD-CCNC: 342 U/L
ALT SERPL-CCNC: 83 U/L
ANION GAP SERPL CALC-SCNC: 11 MMOL/L
AST SERPL-CCNC: 56 U/L
BASOPHILS # BLD AUTO: 0.03 K/UL
BASOPHILS NFR BLD AUTO: 0.7 %
BILIRUB SERPL-MCNC: 0.6 MG/DL
BUN SERPL-MCNC: 20 MG/DL
CALCIUM SERPL-MCNC: 9.1 MG/DL
CHLORIDE SERPL-SCNC: 102 MMOL/L
CO2 SERPL-SCNC: 28 MMOL/L
CREAT SERPL-MCNC: 1.18 MG/DL
EGFR: 67 ML/MIN/1.73M2
EOSINOPHIL # BLD AUTO: 0.15 K/UL
EOSINOPHIL NFR BLD AUTO: 3.4 %
GLUCOSE SERPL-MCNC: 173 MG/DL
HCT VFR BLD CALC: 42.9 %
HGB BLD-MCNC: 13.8 G/DL
IMM GRANULOCYTES NFR BLD AUTO: 0.2 %
LYMPHOCYTES # BLD AUTO: 1.34 K/UL
LYMPHOCYTES NFR BLD AUTO: 30.8 %
MAN DIFF?: NORMAL
MCHC RBC-ENTMCNC: 29.2 PG
MCHC RBC-ENTMCNC: 32.2 GM/DL
MCV RBC AUTO: 90.9 FL
MONOCYTES # BLD AUTO: 0.52 K/UL
MONOCYTES NFR BLD AUTO: 12 %
NEUTROPHILS # BLD AUTO: 2.3 K/UL
NEUTROPHILS NFR BLD AUTO: 52.9 %
PLATELET # BLD AUTO: 197 K/UL
POTASSIUM SERPL-SCNC: 4.4 MMOL/L
PROT SERPL-MCNC: 7.9 G/DL
RBC # BLD: 4.72 M/UL
RBC # FLD: 15.5 %
SODIUM SERPL-SCNC: 142 MMOL/L
WBC # FLD AUTO: 4.35 K/UL

## 2022-12-19 NOTE — ASSESSMENT
[FreeTextEntry1] : Impression:\par \par #1. Indeterminant mid common bile duct stricture, s/p EUS/FNB/ERCP/brush biopsy/forceps biopsy/stent placement early July 2022. Most recent CT demonstrates bile duct stent in place and pancreatic stent no longer present. Atypical cells on fine-needle biopsy, there is concern for malignancy, although not yet proven, indeterminant biliary strictures are often difficult to diagnose definitively.  Patient had subsequent ERCP on 10/7/2022 with removal of biliary sludge, dilated common bile duct, and apparent resolution of the biliary stricture on final occlusion cholangiogram.\par \par #2. History of renal mass s/p nephrectomy\par \par #3.  Diabetes mellitus on Januvia.\par \par Plan:\par \par #1.  Labs to look for biochemical evidence of recurrent biliary stricture\par \par #2.  CT scan abd / liver protocol (claustrophobic requiring anesthesia for inpatient MRI/MRCP)\par \par #3.  Will call for significant findings requiring further testing/procedures.\par \par #4.  Followup in 3 months.

## 2022-12-19 NOTE — PHYSICAL EXAM
[Alert] : alert [No Acute Distress] : no acute distress [Sclera] : the sclera and conjunctiva were normal [Normal Appearance] : the appearance of the neck was normal [No Respiratory Distress] : no respiratory distress [Auscultation Breath Sounds / Voice Sounds] : lungs were clear to auscultation bilaterally [Normal S1, S2] : normal S1 and S2 [None] : no edema [Bowel Sounds] : normal bowel sounds [Abdomen Tenderness] : non-tender [Abdomen Soft] : soft [Abnormal Walk] : normal gait [Normal Color / Pigmentation] : normal skin color and pigmentation [No Focal Deficits] : no focal deficits [Normal Affect] : the affect was normal [Normal Mood] : the mood was normal [de-identified] : Face mask in place

## 2022-12-19 NOTE — REVIEW OF SYSTEMS
[Fever] : no fever [Chills] : no chills [Chest Pain] : no chest pain [Abdominal Pain] : no abdominal pain [FreeTextEntry2] : Reports he has gained some weight back  [FreeTextEntry7] : Denies bowel disturbance

## 2022-12-19 NOTE — CONSULT LETTER
[Dear  ___] : Dear  [unfilled], [Consult Letter:] : I had the pleasure of evaluating your patient, [unfilled]. [Please see my note below.] : Please see my note below. [Consult Closing:] : Thank you very much for allowing me to participate in the care of this patient.  If you have any questions, please do not hesitate to contact me. [Sincerely,] : Sincerely, [FreeTextEntry3] : Rogelio Burnett MD, MPH, PREETI, ARMANDO\par Chief of Clinical Quality in Gastroenterology, [facility]\par Associate Chief of Gastroenterology, [facility]\par Interim Director of Endoscopy, [facility]\par Director of Endoscopic Ultrasound, [facility]\par [address], [address]\par [facility], [address]\par 24 hours [phone]\par \par

## 2022-12-19 NOTE — HISTORY OF PRESENT ILLNESS
[FreeTextEntry1] : Pt follows up for biliary stricture.\par Accompanied by daughter, who works in a [facility] hepatology office.\par \par Asymptomatic, without fevers, chills, sweats, abdominal pain, heartburn, dysphagia, nausea, vomiting, constipation, diarrhea, melena, hematochezia, jaundice or weight loss.\par Feels well, gained weight and energy.\par \par 10/7/22:  s/p ERCP / clearance of bile duct without obvious residual stricture.\par \par      One stent was removed from the biliary tree using a snare and sent for cytology. The bile \par      duct was deeply cannulated with the Rx44 and 0.035 wire. Contrast was injected. I personally \par      interpreted the bile duct images. Ductal flow of contrast was adequate. Image quality was \par      excellent. The main bile duct was mildly dilated and contained irregular filling defects \par      consistent with sludge. The biliary sphincterotomy was extended with a traction (standard) \par      sphincterotome using ERBE electrocautery. There was no post-sphincterotomy bleeding. The \par      biliary tree was swept with an 8.5 mm and 11.5 mm balloon starting at the bifurcation. Sludge \par      was swept from the duct. Final occlusion cholangiogram demonstrated no stricture in the main \par      bile duct and no retained filling defects. Final fluoroscopic images demonstrated no air in \par      unusual places.\par                                                                                                     \par Impression:     - One stent was removed from the biliary tree and sent for cytology\par                      - Cholangiogram demonstrated sludge in a mildly dilated main bile duct.\par                      - A biliary sphincterotomy was extended.\par                      - The biliary tree was swept and sludge was found.\par                      - Final occlusion cholangiogram demonstrated no stricture in the main bile \par                      duct and no retained filling defects.\par \par \par From last Note 8/11/22:\par __________________________\par \par MRIMRCP July 6 2022:\par LIVER: Normal morphology. No significant steatosis. A few scattered cysts \par measuring up to 1 cm in the posterior right lobe. No suspicious enhancing \par lesions.\par BILE DUCTS: A 2.6 x 2.2 cm enhancing T2 intermediate signal mass versus \par lymphadenopathy encasing the mid common bile duct with resultant 1.1 cm \par long stricture at that level. Periductal enhancement extends superiorly \par to involve the more proximal CBD and the central left intrahepatic \par biliary tree. Upstream of the CBD stricture, there is mild intra and \par extrahepatic biliary duct dilatation. Question intrahepatic ductal \par irregularity versus motion artifact.\par GALLBLADDER: Contracted and filled with sludge.\par SPLEEN: Within normal limits.\par PANCREAS: Within normal limits.\par ADRENALS: Right adrenal gland is not well visualized. Normal left adrenal \par gland.\par KIDNEYS/URETERS: Right nephrectomy. Normal left kidney.\par \par VISUALIZED PORTIONS:\par BOWEL: Moderate stool burden throughout the colon.\par PERITONEUM: No ascites.\par VESSELS: Atherosclerotic changes. Left hepatic artery replaced to the \par left gastric artery.\par RETROPERITONEUM/LYMPH NODES: A CBD mass versus periportal lymphadenopathy \par with extrinsic compression of the mid CBD.\par ABDOMINAL WALL: Within normal limits.\par BONES: Degenerative changes.\par \par IMPRESSION:\par Motion limited study.\par \par A 2.6 cm mass encasing the mid CBD resulting in a 1.1 cm long stricture \par and upstream biliary ductal dilatation, suspicious for malignancy. \par Additionally, question intrahepatic biliary ductal \par irregularity/strictures versus artifact. Consider primary biliary \par malignancy or extrinsic compression by abnormal lymphadenopathy. \par Differential also includes inflammatory conditions such as IgG4 \par sclerosing cholangitis\par \par EUS/FNB: 7/7/22\par Findings:\par  ENDOSCOPIC FINDING: :\par  A hiatal hernia was present.\par  The exam of the esophagus was otherwise normal.\par  Multiple dispersed, non-bleeding erosions were found in the gastric antrum. There were no \par  stigmata of recent bleeding. Biopsies were taken with a cold forceps for Helicobacter pylori \par  testing.\par  The examined duodenum was normal.\par  ENDOSONOGRAPHIC FINDING: :\par  The esophagus, stomach and duodenum and adjacent structures were visualized \par  endosonographically.\par  There was a suggestion of a mass causing a stricture in the middle third of the main bile \par  duct. The origin appeared to be from within the wall of the bile duct and involved the taked \par  off of the cystic duct which was dilated. The gallbladder was contracted and not well \par  visualized. There was biliary ductal dilation proximal to the stricture. Fine needle biopsy \par  for cytology was performed. Four passes were made with the 25 gauge [facility] needle using \par  a transduodenal approach. A stylet was used. A cytologist was present and performed a \par  preliminary cytologic examination. The cellularity of the specimen was adequate. Final \par  cytology results are pending.\par  Pancreatic parenchymal abnormalities were noted in the entire pancreas. These consisted of \par  lobularity.\par  One enlarged lymph node was visualized in the perihilar region. The node was round, \par  hypoechoic and had well defined margins.\par        \par Impression: - Intrinsic mass within the mid bile duct with biliary ductal dilation \par   proximal to the stricture s/p FNB.\par \par ERCP: 7/7/22\par Findings:\par  The  film was normal. The esophagus was successfully intubated under direct vision. The \par  scope was advanced to a normal major papilla in the descending duodenum without detailed \par  examination of the pharynx, larynx and associated structures, and upper GI tract as an EGD \par  was performed earlier today. The major papilla was normal. Initial attempts at biliary \par  cannulation with a 0.025 inch Jagwire over a sphincterotome resulted in passage of the wire \par  into the ventral pancreatic duct. Contrast was injected showing normal pancreatic ductal \par  anatomy and the wire was left in place. A secone 0.025 inch wire was then used to attempt \par  deep biliary cannulation but was unsuccesful. The decision to perfom a pre-cut sphincterotomy \par  was made and succesful deep biliary access was obtained. There was evidence of a high-grade \par  stricture in the middle third of the bile duct with proximal biliary ductal dilation of the \par  intra and extrahepatic ducts. Cells for cytology were obtained by brushing in the middle \par  third of the main bile duct and pediatric forceps were passed under endoscopic and \par  fluoroscopic guidance for biopsies. One 5 Fr by 3 cm single pigtail plastic stent was placed \par  into the ventral pancreatic duct for post-ERCP prophylaxis. Clear fluid flowed through the \par  stent. The stent was in good position. One 10 Fr by 7 cm Advanix plastic stent was placed \par  into the common bile duct. Bile flowed through the stent. The stent was in good position.\par        \par Impression: - ERCP revealed a high-grade mid CBD stricture concerning for malignancy s/p \par   brushing/biopsy and placement of a plastic biliary and pancreatic duct stent.\par \par \par Cytology:\par Final Diagnosis\par BILE DUCT, EUS GUIDED FNA\par ATYPICAL FINDINGS.\par \par Cytology slides show ductal cells with nuclear atypia, some in papillary-\par like structures, in a background of bland glandular epithelium and stromal\par \par fragments with crushed lymphocytes. Cell block consists of scant benign-\par appearing glandular epithelium and rare stroma with chronic inflammation;\par additional levels were obtained and examined. No high-grade dysplasia, no\par necrosis or mitotic figures seen. Although there are no overt features of\par malignancy, the cytologic and architectural atypia raise the possibility\par of a neoplastic process.\par \par Cytology:\par 1. COMMON BILE DUCT WASH\par NEGATIVE FOR MALIGNANT CELLS.\par Cytology slide and cell block consists of benign ductal epithelial\par cells.\par \par \par 2. COMMON BILE DUCT BRUSH\par NEGATIVE FOR MALIGNANT CELLS.\par Benign ductal epithelial cells.\par \par Pathology:\par 1. Gastric, biopsy\par - Mild chronic inactive gastritis.\par - Negative for intestinal metaplasia.\par - No morphologic evidence of H. pylori.\par - Negative for dysplasia and malignancy.\par \par 2. Bile duct, biopsy\par - Few superficial fragments of benign biliary epithelium.\par - Minimal underlying stroma and blood clot\par \par CT abd/pelvis: 7/21/22\par LOWER CHEST: Bibasilar linear atelectasis.\par \par LIVER: Right hepatic lobe cyst and scattered subcentimeter hypodense \par lesions too small to characterize.\par BILE DUCTS: Pneumobilia. Mild intrahepatic ductal dilatation. CBD stent \par in place. Previously described mass encasing the midportion of the CBD is \par not well visualized.\par GALLBLADDER: Contracted. Mild gallbladder wall thickening versus \par underdistention.\par SPLEEN: Within normal limits.\par PANCREAS: Within normal limits.\par ADRENALS: Within normal limits.\par KIDNEYS/URETERS: Right nephrectomy. Left kidney is unremarkable.\par \par BLADDER: Within normal limits.\par REPRODUCTIVE ORGANS: Prostate is enlarged.\par \par BOWEL: No bowel obstruction. Appendix is normal.\par PERITONEUM: No ascites.\par VESSELS: Atherosclerotic changes.\par RETROPERITONEUM/LYMPH NODES: No lymphadenopathy.\par ABDOMINAL WALL: Within normal limits.\par BONES: Degenerative changes.\par \par IMPRESSION:\par No acute abdominal or pelvic pathology.\par \par Previously described mass encasing the mid midportion of the CBD is not \par well visualized. CBD stent in place. Pneumobilia. Mild intrahepatic \par ductal dilatation.\par \par \par

## 2023-01-05 ENCOUNTER — NON-APPOINTMENT (OUTPATIENT)
Age: 68
End: 2023-01-05

## 2023-01-07 ENCOUNTER — APPOINTMENT (OUTPATIENT)
Dept: CT IMAGING | Facility: IMAGING CENTER | Age: 68
End: 2023-01-07

## 2023-03-07 NOTE — PHYSICAL THERAPY INITIAL EVALUATION ADULT - LEVEL OF INDEPENDENCE: SIT/STAND, REHAB EVAL
What Type Of Note Output Would You Prefer (Optional)?: Standard Output What Is The Reason For Today's Visit?: Full Body Skin Examination What Is The Reason For Today's Visit? (Being Monitored For X): the development of new lesions independent

## 2023-03-18 ENCOUNTER — APPOINTMENT (OUTPATIENT)
Dept: CT IMAGING | Facility: IMAGING CENTER | Age: 68
End: 2023-03-18
Payer: COMMERCIAL

## 2023-03-18 ENCOUNTER — OUTPATIENT (OUTPATIENT)
Dept: OUTPATIENT SERVICES | Facility: HOSPITAL | Age: 68
LOS: 1 days | End: 2023-03-18
Payer: COMMERCIAL

## 2023-03-18 DIAGNOSIS — Z98.89 OTHER SPECIFIED POSTPROCEDURAL STATES: Chronic | ICD-10-CM

## 2023-03-18 DIAGNOSIS — Z00.8 ENCOUNTER FOR OTHER GENERAL EXAMINATION: ICD-10-CM

## 2023-03-18 DIAGNOSIS — Z90.5 ACQUIRED ABSENCE OF KIDNEY: Chronic | ICD-10-CM

## 2023-03-18 DIAGNOSIS — K83.1 OBSTRUCTION OF BILE DUCT: ICD-10-CM

## 2023-03-18 PROCEDURE — 74160 CT ABDOMEN W/CONTRAST: CPT | Mod: 26

## 2023-03-18 PROCEDURE — 74160 CT ABDOMEN W/CONTRAST: CPT

## 2023-04-20 ENCOUNTER — APPOINTMENT (OUTPATIENT)
Dept: GASTROENTEROLOGY | Facility: CLINIC | Age: 68
End: 2023-04-20
Payer: COMMERCIAL

## 2023-04-20 VITALS
SYSTOLIC BLOOD PRESSURE: 140 MMHG | WEIGHT: 143 LBS | BODY MASS INDEX: 21.18 KG/M2 | DIASTOLIC BLOOD PRESSURE: 66 MMHG | HEIGHT: 69 IN | OXYGEN SATURATION: 97 % | HEART RATE: 85 BPM | TEMPERATURE: 97.9 F

## 2023-04-20 DIAGNOSIS — R79.89 OTHER SPECIFIED ABNORMAL FINDINGS OF BLOOD CHEMISTRY: ICD-10-CM

## 2023-04-20 DIAGNOSIS — K83.8 OTHER SPECIFIED DISEASES OF BILIARY TRACT: ICD-10-CM

## 2023-04-20 DIAGNOSIS — K83.1 OBSTRUCTION OF BILE DUCT: ICD-10-CM

## 2023-04-20 PROCEDURE — 99214 OFFICE O/P EST MOD 30 MIN: CPT

## 2023-04-23 PROBLEM — K83.8 BILIARY SLUDGE: Status: ACTIVE | Noted: 2022-12-19

## 2023-04-23 PROBLEM — K83.8 DILATION OF BILIARY TRACT: Status: ACTIVE | Noted: 2022-12-19

## 2023-04-23 PROBLEM — R79.89 ABNORMAL LFTS: Status: ACTIVE | Noted: 2023-04-23

## 2023-04-23 PROBLEM — K83.1 BILIARY STRICTURE: Status: ACTIVE | Noted: 2022-08-11

## 2023-04-23 NOTE — HISTORY OF PRESENT ILLNESS
[FreeTextEntry1] : Pt follows up for biliary stricture.\par Accompanied by daughter, who works in a [facility] hepatology office.\par \par Asymptomatic, without fevers, chills, sweats, abdominal pain, heartburn, dysphagia, nausea, vomiting, constipation, diarrhea, melena, hematochezia, jaundice or weight loss.\par \par Laboratory data December 2022 notable for\par Bilirubin normal at 0.6.\par AST 56, ALT 83, alkaline phosphatase 342.\par \par CT Abdomen w/ IV Cont             Final\par \par EXAM: [id number] - CT ABDOMEN ONLY IC  - ORDERED BY: ABDIRASHID MCKAY\par \par PROCEDURE DATE:  03/18/2023\par \par INTERPRETATION:  CLINICAL INFORMATION: History of biliary stricture, resolved on most recent ERCP/cholangiogram with adenopathy,? CBD mass on prior MRI.\par \par COMPARISON: CT 7/21/2022, MRI 7/6/2022, CT 11/12/2018\par \par CONTRAST/COMPLICATIONS:\par IV Contrast: Omnipaque 350  90 cc administered   10 cc discarded\par Oral Contrast: Omnipaque 300\par \par PROCEDURE:\par CT of the Abdomen was performed.\par Arterial, Portal Venous and Delayed phases were acquired.\par Sagittal and coronal reformats were performed.\par \par FINDINGS:\par LOWER CHEST: Calcified left basilar granuloma and linear bibasilar atelectasis.\par \par LIVER: Normal in size and morphology with few small cysts. No evidence of liver mass.\par BILE DUCTS: Minimal left sided intrahepatic biliary duct dilatation, unchanged since 7/21/2022 and slightly decreased since 7/6/2022. Previous biliary stent has been removed. The CBD is normal caliber. A previously described mass encasing the mid CBD on MRI is again not visualized.\par GALLBLADDER: Within normal limits.\par SPLEEN: Within normal limits.\par PANCREAS: Within normal limits.\par ADRENALS: Within normal limits.\par KIDNEYS/URETERS: Right nephrectomy. Left kidney is unremarkable.\par \par VISUALIZED PORTIONS OF:\par BOWEL: No bowel obstruction.\par PERITONEUM: No ascites.\par VESSELS: Within normal limits. Hepatic and portal veins and SMV are patent.\par RETROPERITONEUM/LYMPH NODES: No lymphadenopathy.\par ABDOMINAL WALL: Within normal limits.\par BONES: Degenerative changes.\par \par IMPRESSION:\par No CBD dilatation or evidence of obstructing hilar mass/adenopathy. Minimal left intrahepatic biliary duct dilatation is unchanged since 7/21/2022.\par \par Last ERCP:\par 10/7/22:  s/p ERCP / clearance of bile duct without obvious residual stricture.\par \par      One stent was removed from the biliary tree using a snare and sent for cytology. The bile \par      duct was deeply cannulated with the Rx44 and 0.035 wire. Contrast was injected. I personally \par      interpreted the bile duct images. Ductal flow of contrast was adequate. Image quality was \par      excellent. The main bile duct was mildly dilated and contained irregular filling defects \par      consistent with sludge. The biliary sphincterotomy was extended with a traction (standard) \par      sphincterotome using ERBE electrocautery. There was no post-sphincterotomy bleeding. The \par      biliary tree was swept with an 8.5 mm and 11.5 mm balloon starting at the bifurcation. Sludge \par      was swept from the duct. Final occlusion cholangiogram demonstrated no stricture in the main \par      bile duct and no retained filling defects. Final fluoroscopic images demonstrated no air in \par      unusual places.\par                                                                                                     \par Impression:     - One stent was removed from the biliary tree and sent for cytology\par                      - Cholangiogram demonstrated sludge in a mildly dilated main bile duct.\par                      - A biliary sphincterotomy was extended.\par                      - The biliary tree was swept and sludge was found.\par                      - Final occlusion cholangiogram demonstrated no stricture in the main bile \par                      duct and no retained filling defects.\par \par

## 2023-04-23 NOTE — ASSESSMENT
[FreeTextEntry1] : Impression:\par \par #1. Indeterminant mid common bile duct stricture, s/p EUS/FNB/ERCP/brush biopsy/forceps biopsy/stent placement early July 2022. Most recent CT demonstrates bile duct stent in place and pancreatic stent no longer present. Atypical cells on fine-needle biopsy, there is concern for malignancy, although not yet proven, indeterminant biliary strictures are often difficult to diagnose definitively. Patient had subsequent ERCP on 10/7/2022 with removal of biliary sludge, dilated common bile duct, and apparent resolution of the biliary stricture on final occlusion cholangiogram.  Surveillance CT scan in March 2023 demonstrated no recurrence of mass encasing the mid common bile duct.  There was stability of the left mild left intrahepatic ductal dilatation.\par \par #2.  Abnormal LFTs with mildly elevated AST/ALT and moderately elevated alkaline phosphatase.  \par \par #3.  History of renal mass s/p nephrectomy\par \par #4.  DM on Januvia.\par \par Recommendations:\par \par #1.  Check LFTs at next office visit.\par \par #2.  Ultrasound of the liver for surveillance of the mild intrahepatic ductal dilatation on the left.\par \par #3.  Follow up in office 3 months.\par

## 2023-04-23 NOTE — PHYSICAL EXAM
[Alert] : alert [Sclera] : the sclera and conjunctiva were normal [Normal Affect] : the affect was normal [de-identified] : Soft, nondistended, nontender, bowel sounds present. [de-identified] : No jaundice [de-identified] : No confusion

## 2023-04-23 NOTE — CONSULT LETTER
[Dear  ___] : Dear  [unfilled], [Consult Letter:] : I had the pleasure of evaluating your patient, [unfilled]. [Please see my note below.] : Please see my note below. [Sincerely,] : Sincerely, [Consult Closing:] : Thank you very much for allowing me to participate in the care of this patient.  If you have any questions, please do not hesitate to contact me. [FreeTextEntry3] : Rogelio Burnett MD, MPH, PREETI, ARMANDO\par Chief of Clinical Quality in Gastroenterology, [facility]\par Associate Chief of Gastroenterology, [facility]\par Interim Director of Endoscopy, [facility]\par Director of Endoscopic Ultrasound, [facility]\par [address], [address]\par [facility], [address]\par 24 hours [phone]\par \par

## 2023-05-22 NOTE — PROGRESS NOTE ADULT - SUBJECTIVE AND OBJECTIVE BOX
05/22/23                            Brent Swenson  [address] [place] [address]  [place] [id number]    To Whom It May Concern:    This is to certify Mitaalanis Swenson was evaluated with Senthil Giordano MD on 05/22/23 and can return to regular work on 05/24/2023.     RESTRICTIONS: none            Electronically signed by:  Senthil Giordano MD  [facility]--[address] [place] [address]  Dept Phone: [phone]        Internal Medicine   Rad Arzola| PGY-1    OVERNIGHT EVENTS:      SUBJECTIVE:       MEDICATIONS  (STANDING):  amLODIPine   Tablet 10 milliGRAM(s) Oral daily  enoxaparin Injectable 40 milliGRAM(s) SubCutaneous every 24 hours  oxybutynin 10 milliGRAM(s) Oral two times a day  tamsulosin 0.4 milliGRAM(s) Oral at bedtime    MEDICATIONS  (PRN):  melatonin 3 milliGRAM(s) Oral at bedtime PRN Insomnia        T(F): 97.9 (07-22-22 @ 04:26), Max: 99.2 (07-21-22 @ 08:59)  HR: 73 (07-22-22 @ 04:26) (64 - 84)  BP: 124/73 (07-22-22 @ 04:26) (107/55 - 137/70)  BP(mean): --  RR: 18 (07-22-22 @ 04:26) (18 - 20)  SpO2: 95% (07-22-22 @ 04:26) (95% - 99%)    PHYSICAL EXAM:     GENERAL: NAD, lying in bed comfortably.  HEAD:  Atraumatic, normocephalic.  EYES: EOMI, PERRLA, conjunctiva and sclera clear, no nystagmus noted.  ENT: Moist mucous membranes,   NECK: Supple. No JVD. Trachea midline.  CHEST/LUNG: CTAB. No rales, rhonchi, wheezing, or rubs. Unlabored respirations.  HEART: RRR, no M/R/G, normal S1/S2.  ABDOMEN: Soft, nontender, nondistended, no organomegaly. Normoactive bowel sounds.  EXTREMITIES:  2+ peripheral pulses b/l, brisk capillary refill. No clubbing, cyanosis, or edema.  MSK: No gross deformities noted.   NEURO:  AAOx3, no focal deficits.   SKIN: No rashes or lesions.  PSYCH: Normal mood, affect.    TELEMETRY:    LABS:                        12.9   4.51  )-----------( 199      ( 22 Jul 2022 07:07 )             40.4     07-21    143  |  103  |  21  ----------------------------<  132<H>  4.1   |  25  |  1.23    Ca    9.0      21 Jul 2022 09:23    TPro  7.7  /  Alb  3.9  /  TBili  0.9  /  DBili  x   /  AST  28  /  ALT  146<H>  /  AlkPhos  373<H>  07-21        PT/INR - ( 21 Jul 2022 09:23 )   PT: 12.0 sec;   INR: 1.03 ratio         PTT - ( 21 Jul 2022 09:23 )  PTT:29.6 sec  Blood Gas Profile w/Lytes - Venous: Performed In Lab (07-21-22 @ 08:55)    Creatinine Trend: 1.23<--, 1.66<--, 1.23<--, 1.39<--, 1.48<--, 1.05<--  I&O's Summary    BNP  Blood Gas Profile w/Lytes - Venous: Performed In Lab (07-21-22 @ 08:55)    RADIOLOGY & ADDITIONAL STUDIES:             Internal Medicine   Rad Arzola| PGY-1    OVERNIGHT EVENTS:  No acute overnight events     SUBJECTIVE:   Patient is feeling well this AM  Denies abdominal pain, nausea/vomiting, dizziness, weakness, headache    MEDICATIONS  (STANDING):  amLODIPine   Tablet 10 milliGRAM(s) Oral daily  enoxaparin Injectable 40 milliGRAM(s) SubCutaneous every 24 hours  oxybutynin 10 milliGRAM(s) Oral two times a day  tamsulosin 0.4 milliGRAM(s) Oral at bedtime    MEDICATIONS  (PRN):  melatonin 3 milliGRAM(s) Oral at bedtime PRN Insomnia        T(F): 97.9 (22 @ 04:26), Max: 99.2 (22 @ 08:59)  HR: 73 (22 @ 04:26) (64 - 84)  BP: 124/73 (22 @ 04:26) (107/55 - 137/70)  BP(mean): --  RR: 18 (22 @ 04:26) (18 - 20)  SpO2: 95% (22 @ 04:26) (95% - 99%)    PHYSICAL EXAM:     GENERAL: NAD, lying in bed comfortably.  HEAD:  Atraumatic, normocephalic.  EYES: EOMI, PERRLA, conjunctiva and sclera clear, no nystagmus noted.  ENT: Moist mucous membranes,   NECK: Supple. No JVD. Trachea midline.  CHEST/LUNG: CTAB. No rales, rhonchi, wheezing, or rubs. Unlabored respirations.  HEART: RRR, no M/R/G, normal S1/S2.  ABDOMEN: Soft, nontender, nondistended, no organomegaly. Normoactive bowel sounds.  EXTREMITIES:  2+ peripheral pulses b/l, brisk capillary refill. No clubbing, cyanosis, or edema.  MSK: No gross deformities noted.   NEURO:  AAOx3, no focal deficits.   SKIN: No rashes or lesions.  PSYCH: Normal mood, affect.    TELEMETRY:    LABS:                           12.9   4.51  )-----------( 199      ( 2022 07:07 )             40.4         139  |  102  |  18  ----------------------------<  101<H>  4.1   |  27  |  1.21    Ca    8.6      2022 06:52    TPro  6.9  /  Alb  3.3  /  TBili  0.6  /  DBili  x   /  AST  17  /  ALT  98<H>  /  AlkPhos  304<H>  07-        PT/INR - ( 2022 07:07 )   PT: 11.8 sec;   INR: 1.03 ratio         PTT - ( 2022 07:07 )  PTT:29.4 sec      Urinalysis Basic - ( 2022 11:59 )    Color: Light Yellow / Appearance: Clear / S.033 / pH: x  Gluc: x / Ketone: Negative  / Bili: Negative / Urobili: Negative   Blood: x / Protein: Trace / Nitrite: Negative   Leuk Esterase: Negative / RBC: 6 /hpf / WBC 0 /HPF   Sq Epi: x / Non Sq Epi: 0 /hpf / Bacteria: Negative        LIVER FUNCTIONS - ( 2022 06:52 )  Alb: 3.3 g/dL / Pro: 6.9 g/dL / ALK PHOS: 304 U/L / ALT: 98 U/L / AST: 17 U/L / GGT: x                 I&O's Summary    2022 07:01  -  2022 14:43  --------------------------------------------------------  IN: 240 mL / OUT: 0 mL / NET: 240 mL           /     CAPILLARY BLOOD GLUCOSE                MICRO:

## 2023-06-19 NOTE — PACU DISCHARGE NOTE - NSPTMEETSDISCHCRITERIADT_GEN_A_CORE
Patient is in the lateral left side position.   The body was positioned using the following devices: blanket, wedge and draw sheet.  Procedure performed on a bed/cart.Patient positioned self prior to start of procedure 06-Jul-2022 12:24

## 2023-07-23 ENCOUNTER — EMERGENCY (EMERGENCY)
Facility: HOSPITAL | Age: 68
LOS: 1 days | Discharge: HOME | End: 2023-07-23
Attending: STUDENT IN AN ORGANIZED HEALTH CARE EDUCATION/TRAINING PROGRAM
Payer: COMMERCIAL

## 2023-07-23 VITALS
OXYGEN SATURATION: 98 % | RESPIRATION RATE: 20 BRPM | SYSTOLIC BLOOD PRESSURE: 133 MMHG | HEIGHT: 69 IN | WEIGHT: 141.98 LBS | DIASTOLIC BLOOD PRESSURE: 82 MMHG | HEART RATE: 68 BPM | TEMPERATURE: 98 F

## 2023-07-23 DIAGNOSIS — Z98.89 OTHER SPECIFIED POSTPROCEDURAL STATES: Chronic | ICD-10-CM

## 2023-07-23 DIAGNOSIS — Z90.5 ACQUIRED ABSENCE OF KIDNEY: Chronic | ICD-10-CM

## 2023-07-23 PROCEDURE — 96372 THER/PROPH/DIAG INJ SC/IM: CPT

## 2023-07-23 PROCEDURE — 99283 EMERGENCY DEPT VISIT LOW MDM: CPT

## 2023-07-23 PROCEDURE — 99283 EMERGENCY DEPT VISIT LOW MDM: CPT | Mod: 25

## 2023-07-23 RX ORDER — KETOROLAC TROMETHAMINE 30 MG/ML
15 SYRINGE (ML) INJECTION ONCE
Refills: 0 | Status: DISCONTINUED | OUTPATIENT
Start: 2023-07-23 | End: 2023-07-23

## 2023-07-23 RX ORDER — LIDOCAINE 4 G/100G
1 CREAM TOPICAL ONCE
Refills: 0 | Status: COMPLETED | OUTPATIENT
Start: 2023-07-23 | End: 2023-07-23

## 2023-07-23 RX ORDER — ACETAMINOPHEN 500 MG
3 TABLET ORAL
Qty: 84 | Refills: 0
Start: 2023-07-23 | End: 2023-07-29

## 2023-07-23 RX ORDER — ACETAMINOPHEN 500 MG
975 TABLET ORAL ONCE
Refills: 0 | Status: COMPLETED | OUTPATIENT
Start: 2023-07-23 | End: 2023-07-23

## 2023-07-23 RX ORDER — IBUPROFEN 200 MG
1 TABLET ORAL
Qty: 21 | Refills: 0
Start: 2023-07-23 | End: 2023-07-29

## 2023-07-23 RX ADMIN — Medication 975 MILLIGRAM(S): at 11:54

## 2023-07-23 RX ADMIN — LIDOCAINE 1 PATCH: 4 CREAM TOPICAL at 11:56

## 2023-07-23 RX ADMIN — Medication 15 MILLIGRAM(S): at 11:38

## 2023-07-23 NOTE — ED ADULT NURSE NOTE - CHIEF COMPLAINT QUOTE
right sided flank pain radiating to the back x 1 month worsening today  s/p right sided kidney removal 2018

## 2023-07-23 NOTE — ED ADULT NURSE NOTE - OBJECTIVE STATEMENT
Patient is a 67 male with a past medical history of right nephrectomy for renal carcinoma, hypertension, BPH, CBD mass who presents emergency department complaining of right buttock pain.  Patient states the pain has been going on for about 3 weeks and has acutely gotten worse over the last 2 days.  Patient denies any trauma or inciting events.  Patient states the pain is in the right buttock and nonradiating in nature.  Patient states that he has tried aspirin and ibuprofen which has alleviated his pain.  Patient denies any incontinence, paresthesias, numbness or tingling, fevers, chills

## 2023-07-23 NOTE — ED PROVIDER NOTE - PHYSICAL EXAMINATION
Physical Exam:  Gen: NAD, AOx3, non-toxic appearing, able to ambulate without assistance  Head: NCAT  HEENT: EOMI, PEERLA, normal conjunctiva, tongue midline, oral mucosa moist  Lung: CTAB, no respiratory distress, no wheezes/rhonchi/rales B/L, speaking in full sentences  CV: RRR  Abd: soft, NT, ND, no guarding, no rigidity, no rebound tenderness, no CVA tenderness   MSK: no visible deformities, ROM normal in UE/LE, no back pain  Neuro: No focal sensory or motor deficits  Skin: Warm, well perfused, no rash, no leg swelling  Psych: normal affect, calm Physical Exam:  Gen: NAD, AOx3, non-toxic appearing  Head: NCAT  HEENT: EOMI, PEERLA, normal conjunctiva, tongue midline, oral mucosa moist  Lung: CTAB, no respiratory distress, no wheezes/rhonchi/rales B/L, speaking in full sentences  CV: RRR  Abd: soft, NT, ND, no guarding, no rigidity, no rebound tenderness, no CVA tenderness   MSK: no visible deformities, ROM normal in UE/LE, no midline/paraspinal tenderness  Neuro: 5/5 strength all 4 extremities with sensation intact, no saddle anesthesia, normal gait w/o assistance  Skin: Warm, well perfused, no rash, no leg swelling  Psych: normal affect, calm

## 2023-07-23 NOTE — ED PROVIDER NOTE - ATTENDING CONTRIBUTION TO CARE
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 Present over 3 weeks, worse over last 3 days especially last night but states already improved from prior. No prior back procedure or recent instrumentation/injection, no fevers, no urinary complaints, no trauma/injury, no flank pain. Appears well, 5/5 strength B/L lower and upper extremities, normal gait W/O assistance, no midline tenderness, no overlying rashes, subjective tenderness over R iliac crest/sacrum. Suspect MSK in context of extremely reassuring history and physical as above. Will give pain meds and reassess. -Zaira Ulrich MD (Attending)

## 2023-07-23 NOTE — ED PROVIDER NOTE - NSFOLLOWUPINSTRUCTIONS_ED_ALL_ED_FT
YOU WERE SEEN IN THE ED FOR: Pain in your back/ buttocks     WHILE YOU WERE HERE, YOU HAD: Medications to help alleviate your pain.       YOU WERE PRESCRIBED: Tylenol and Ibuprofen   FOLLOW THE INSTRUCTIONS ON THE LABEL/CONTAINER    FOR PAIN, YOU MAY TAKE TYLENOL (Acetaminophen) AND/OR IBUPROFEN (Advil or Motrin). FOLLOW THE INSTRUCTIONS ON THE LABEL/CONTAINER.    PLEASE FOLLOW UP WITH YOUR PRIVATE PHYSICIAN WITHIN THE NEXT 72 HOURS. BRING COPIES OF YOUR RESULTS.    RETURN TO THE EMERGENCY DEPARTMENT IF YOU EXPERIENCE ANY NEW/CONCERNING/WORSENING SYMPTOMS SUCH AS BUT NOT LIMITED TO: Worsening back pain/gluteal pain, fever, chest pain, shortness of breath, urinary incontinence, bowel dysfunction, numbness or tingling of one side of your body.

## 2023-07-23 NOTE — ED PROVIDER NOTE - PROGRESS NOTE DETAILS
Patient reassessed, feels better after pain medication. Advised PMD follow up and supportive care at home with OTC pain medications. -Zaira Ulrich MD (Attending)

## 2023-07-23 NOTE — ED PROVIDER NOTE - NS ED ATTENDING STATEMENT MOD
I have seen and examined this patient and fully participated in the care of this patient as the teaching attending.  The service was shared with the DIANA.  I reviewed and verified the documentation and independently performed the documented:

## 2023-07-23 NOTE — ED ADULT TRIAGE NOTE - CHIEF COMPLAINT QUOTE
right sided flank pain radiating to the back x 1 month worsening today  s/p right sided kidney removal 2018 Non-Graft Cartilage Fenestration Text: The cartilage was fenestrated with a 2mm punch biopsy to help facilitate healing.

## 2023-07-23 NOTE — ED PROVIDER NOTE - PATIENT PORTAL LINK FT
You can access the FollowMyHealth Patient Portal offered by [facility] by registering at the following website: [email or web address]. By joining [employer]’s FollowMyHealth portal, you will also be able to view your health information using other applications (apps) compatible with our system.

## 2023-07-23 NOTE — ED PROVIDER NOTE - CLINICAL SUMMARY MEDICAL DECISION MAKING FREE TEXT BOX
Patient presents emergency department complaining of buttock pain.  Patient is hemodynamically stable afebrile on presentation.  Patient is exam significant for right buttock tenderness.  Patient is able to ambulate without assistance.  Patient has no red flag symptoms on exam.  Patient likely presenting with musculoskeletal pain we will treat patient.  Symptomatically and reassess patient.

## 2023-07-23 NOTE — ED PROVIDER NOTE - OBJECTIVE STATEMENT
Patient is a 67 male with a past medical history of right nephrectomy for renal carcinoma, hypertension, BPH, CBD mass who presents emergency department complaining of right buttock pain.  Patient states the pain has been going on for about 3 weeks and has acutely gotten worse over the last 2 days.  Patient denies any trauma or inciting events.  Patient states the pain is in the right buttock and nonradiating in nature.  Patient states that he has tried aspirin and ibuprofen which has alleviated his pain.  Patient denies any incontinence, paresthesias, numbness or tingling, fevers, chills, chest pain, shortness of breath, abdominal pain.  Patient denies any blood in her stool or urine.

## 2023-10-25 NOTE — DISCHARGE NOTE PROVIDER - NSRESEARCHGRANT_PROPHYLAXISRECOMFT_GEN_A_CORE
Products Recommended: Elta md uv clear General Sunscreen Counseling: I recommended a broad spectrum sunscreen with a SPF of 30 or higher.  I explained that SPF 30 sunscreens block approximately 97 percent of the sun's harmful rays.  Sunscreens should be applied at least 15 minutes prior to expected sun exposure and then every 2 hours after that as long as sun exposure continues. If swimming or exercising sunscreen should be reapplied every 45 minutes to an hour after getting wet or sweating.  One ounce, or the equivalent of a shot glass full of sunscreen, is adequate to protect the skin not covered by a bathing suit. I also recommended a lip balm with a sunscreen as well. Sun protective clothing can be used in lieu of sunscreen but must be worn the entire time you are exposed to the sun's rays. Detail Level: Generalized No post-discharge thromboprophylaxis indicated.

## 2023-11-07 NOTE — ED ADULT NURSE NOTE - ED COMFORT CARE
Bladder Catheterization    Performed by: Nakita Nunez RN  Authorized by: Aleksandra Lowe MD    Consent    Verbal consent was obtained from the patient. Written consent was not obtained from the patient. Risks, benefits, and alternatives were discussed. Consent given by patient. The patient states understanding of the procedure being performed. Patient ID confirmed verbally with the patient.       Indications:     Indications: urine specimen collection    Procedure Details:     Preparation: Patient was prepped and draped in usual sterile fashion      Catheter insertion:  Non-indwelling    Catheter type:  Hinton    Catheter size:  12 Fr    Complicated insertion: No      Altered anatomy: No      Number of attempts:  1    Volume of urine drained (mL):  60    Urine characteristics:  Yellow and clear  Post-procedure:     Patient tolerance:  Patient tolerated the procedure well with no immediate complications    
Patient informed

## 2023-11-28 ENCOUNTER — APPOINTMENT (OUTPATIENT)
Age: 68
End: 2023-11-28
Payer: SELF-PAY

## 2023-11-28 PROCEDURE — D0220: CPT

## 2023-11-28 PROCEDURE — D0140: CPT

## 2023-11-29 ENCOUNTER — APPOINTMENT (OUTPATIENT)
Age: 68
End: 2023-11-29
Payer: SELF-PAY

## 2023-11-29 PROCEDURE — D7140: CPT

## 2023-12-06 ENCOUNTER — APPOINTMENT (OUTPATIENT)
Age: 68
End: 2023-12-06
Payer: SELF-PAY

## 2023-12-06 PROCEDURE — 99024 POSTOP FOLLOW-UP VISIT: CPT

## 2023-12-31 ENCOUNTER — EMERGENCY (EMERGENCY)
Facility: HOSPITAL | Age: 68
LOS: 1 days | Discharge: HOME | End: 2023-12-31
Attending: EMERGENCY MEDICINE
Payer: COMMERCIAL

## 2023-12-31 VITALS
DIASTOLIC BLOOD PRESSURE: 69 MMHG | TEMPERATURE: 101 F | SYSTOLIC BLOOD PRESSURE: 130 MMHG | HEIGHT: 69 IN | WEIGHT: 139.99 LBS | HEART RATE: 98 BPM | OXYGEN SATURATION: 94 % | RESPIRATION RATE: 18 BRPM

## 2023-12-31 VITALS
TEMPERATURE: 99 F | SYSTOLIC BLOOD PRESSURE: 128 MMHG | RESPIRATION RATE: 18 BRPM | OXYGEN SATURATION: 96 % | DIASTOLIC BLOOD PRESSURE: 87 MMHG | HEART RATE: 87 BPM

## 2023-12-31 DIAGNOSIS — Z98.89 OTHER SPECIFIED POSTPROCEDURAL STATES: Chronic | ICD-10-CM

## 2023-12-31 DIAGNOSIS — Z90.5 ACQUIRED ABSENCE OF KIDNEY: Chronic | ICD-10-CM

## 2023-12-31 LAB
ALBUMIN SERPL ELPH-MCNC: 4 G/DL — SIGNIFICANT CHANGE UP (ref 3.3–5)
ALBUMIN SERPL ELPH-MCNC: 4 G/DL — SIGNIFICANT CHANGE UP (ref 3.3–5)
ALP SERPL-CCNC: 76 U/L — SIGNIFICANT CHANGE UP (ref 40–120)
ALP SERPL-CCNC: 76 U/L — SIGNIFICANT CHANGE UP (ref 40–120)
ALT FLD-CCNC: 11 U/L — SIGNIFICANT CHANGE UP (ref 10–45)
ALT FLD-CCNC: 11 U/L — SIGNIFICANT CHANGE UP (ref 10–45)
ANION GAP SERPL CALC-SCNC: 10 MMOL/L — SIGNIFICANT CHANGE UP (ref 5–17)
ANION GAP SERPL CALC-SCNC: 10 MMOL/L — SIGNIFICANT CHANGE UP (ref 5–17)
APTT BLD: 30.9 SEC — SIGNIFICANT CHANGE UP (ref 24.5–35.6)
APTT BLD: 30.9 SEC — SIGNIFICANT CHANGE UP (ref 24.5–35.6)
AST SERPL-CCNC: 16 U/L — SIGNIFICANT CHANGE UP (ref 10–40)
AST SERPL-CCNC: 16 U/L — SIGNIFICANT CHANGE UP (ref 10–40)
BASE EXCESS BLDV CALC-SCNC: 5.1 MMOL/L — HIGH (ref -2–3)
BASE EXCESS BLDV CALC-SCNC: 5.1 MMOL/L — HIGH (ref -2–3)
BASOPHILS # BLD AUTO: 0.09 K/UL — SIGNIFICANT CHANGE UP (ref 0–0.2)
BASOPHILS # BLD AUTO: 0.09 K/UL — SIGNIFICANT CHANGE UP (ref 0–0.2)
BASOPHILS NFR BLD AUTO: 1.8 % — SIGNIFICANT CHANGE UP (ref 0–2)
BASOPHILS NFR BLD AUTO: 1.8 % — SIGNIFICANT CHANGE UP (ref 0–2)
BILIRUB SERPL-MCNC: 0.4 MG/DL — SIGNIFICANT CHANGE UP (ref 0.2–1.2)
BILIRUB SERPL-MCNC: 0.4 MG/DL — SIGNIFICANT CHANGE UP (ref 0.2–1.2)
BUN SERPL-MCNC: 14 MG/DL — SIGNIFICANT CHANGE UP (ref 7–23)
BUN SERPL-MCNC: 14 MG/DL — SIGNIFICANT CHANGE UP (ref 7–23)
CA-I SERPL-SCNC: 1.17 MMOL/L — SIGNIFICANT CHANGE UP (ref 1.15–1.33)
CA-I SERPL-SCNC: 1.17 MMOL/L — SIGNIFICANT CHANGE UP (ref 1.15–1.33)
CALCIUM SERPL-MCNC: 9 MG/DL — SIGNIFICANT CHANGE UP (ref 8.4–10.5)
CALCIUM SERPL-MCNC: 9 MG/DL — SIGNIFICANT CHANGE UP (ref 8.4–10.5)
CHLORIDE BLDV-SCNC: 101 MMOL/L — SIGNIFICANT CHANGE UP (ref 96–108)
CHLORIDE BLDV-SCNC: 101 MMOL/L — SIGNIFICANT CHANGE UP (ref 96–108)
CHLORIDE SERPL-SCNC: 99 MMOL/L — SIGNIFICANT CHANGE UP (ref 96–108)
CHLORIDE SERPL-SCNC: 99 MMOL/L — SIGNIFICANT CHANGE UP (ref 96–108)
CO2 BLDV-SCNC: 32 MMOL/L — HIGH (ref 22–26)
CO2 BLDV-SCNC: 32 MMOL/L — HIGH (ref 22–26)
CO2 SERPL-SCNC: 27 MMOL/L — SIGNIFICANT CHANGE UP (ref 22–31)
CO2 SERPL-SCNC: 27 MMOL/L — SIGNIFICANT CHANGE UP (ref 22–31)
CREAT SERPL-MCNC: 1.43 MG/DL — HIGH (ref 0.5–1.3)
CREAT SERPL-MCNC: 1.43 MG/DL — HIGH (ref 0.5–1.3)
EGFR: 53 ML/MIN/1.73M2 — LOW
EGFR: 53 ML/MIN/1.73M2 — LOW
EOSINOPHIL # BLD AUTO: 0.09 K/UL — SIGNIFICANT CHANGE UP (ref 0–0.5)
EOSINOPHIL # BLD AUTO: 0.09 K/UL — SIGNIFICANT CHANGE UP (ref 0–0.5)
EOSINOPHIL NFR BLD AUTO: 1.8 % — SIGNIFICANT CHANGE UP (ref 0–6)
EOSINOPHIL NFR BLD AUTO: 1.8 % — SIGNIFICANT CHANGE UP (ref 0–6)
FLUAV AG NPH QL: SIGNIFICANT CHANGE UP
FLUAV AG NPH QL: SIGNIFICANT CHANGE UP
FLUBV AG NPH QL: SIGNIFICANT CHANGE UP
FLUBV AG NPH QL: SIGNIFICANT CHANGE UP
GAS PNL BLDV: 136 MMOL/L — SIGNIFICANT CHANGE UP (ref 136–145)
GAS PNL BLDV: 136 MMOL/L — SIGNIFICANT CHANGE UP (ref 136–145)
GAS PNL BLDV: SIGNIFICANT CHANGE UP
GAS PNL BLDV: SIGNIFICANT CHANGE UP
GLUCOSE BLDV-MCNC: 122 MG/DL — HIGH (ref 70–99)
GLUCOSE BLDV-MCNC: 122 MG/DL — HIGH (ref 70–99)
GLUCOSE SERPL-MCNC: 121 MG/DL — HIGH (ref 70–99)
GLUCOSE SERPL-MCNC: 121 MG/DL — HIGH (ref 70–99)
HCO3 BLDV-SCNC: 30 MMOL/L — HIGH (ref 22–29)
HCO3 BLDV-SCNC: 30 MMOL/L — HIGH (ref 22–29)
HCT VFR BLD CALC: 42.2 % — SIGNIFICANT CHANGE UP (ref 39–50)
HCT VFR BLD CALC: 42.2 % — SIGNIFICANT CHANGE UP (ref 39–50)
HCT VFR BLDA CALC: 25 % — LOW (ref 39–51)
HCT VFR BLDA CALC: 25 % — LOW (ref 39–51)
HGB BLD CALC-MCNC: 8.4 G/DL — LOW (ref 12.6–17.4)
HGB BLD CALC-MCNC: 8.4 G/DL — LOW (ref 12.6–17.4)
HGB BLD-MCNC: 13.6 G/DL — SIGNIFICANT CHANGE UP (ref 13–17)
HGB BLD-MCNC: 13.6 G/DL — SIGNIFICANT CHANGE UP (ref 13–17)
INR BLD: 1.04 RATIO — SIGNIFICANT CHANGE UP (ref 0.85–1.18)
INR BLD: 1.04 RATIO — SIGNIFICANT CHANGE UP (ref 0.85–1.18)
LACTATE BLDV-MCNC: 1.2 MMOL/L — SIGNIFICANT CHANGE UP (ref 0.5–2)
LACTATE BLDV-MCNC: 1.2 MMOL/L — SIGNIFICANT CHANGE UP (ref 0.5–2)
LYMPHOCYTES # BLD AUTO: 0.52 K/UL — LOW (ref 1–3.3)
LYMPHOCYTES # BLD AUTO: 0.52 K/UL — LOW (ref 1–3.3)
LYMPHOCYTES # BLD AUTO: 10 % — LOW (ref 13–44)
LYMPHOCYTES # BLD AUTO: 10 % — LOW (ref 13–44)
MANUAL SMEAR VERIFICATION: SIGNIFICANT CHANGE UP
MANUAL SMEAR VERIFICATION: SIGNIFICANT CHANGE UP
MCHC RBC-ENTMCNC: 28 PG — SIGNIFICANT CHANGE UP (ref 27–34)
MCHC RBC-ENTMCNC: 28 PG — SIGNIFICANT CHANGE UP (ref 27–34)
MCHC RBC-ENTMCNC: 32.2 GM/DL — SIGNIFICANT CHANGE UP (ref 32–36)
MCHC RBC-ENTMCNC: 32.2 GM/DL — SIGNIFICANT CHANGE UP (ref 32–36)
MCV RBC AUTO: 86.8 FL — SIGNIFICANT CHANGE UP (ref 80–100)
MCV RBC AUTO: 86.8 FL — SIGNIFICANT CHANGE UP (ref 80–100)
MONOCYTES # BLD AUTO: 0.81 K/UL — SIGNIFICANT CHANGE UP (ref 0–0.9)
MONOCYTES # BLD AUTO: 0.81 K/UL — SIGNIFICANT CHANGE UP (ref 0–0.9)
MONOCYTES NFR BLD AUTO: 15.5 % — HIGH (ref 2–14)
MONOCYTES NFR BLD AUTO: 15.5 % — HIGH (ref 2–14)
NEUTROPHILS # BLD AUTO: 3.66 K/UL — SIGNIFICANT CHANGE UP (ref 1.8–7.4)
NEUTROPHILS # BLD AUTO: 3.66 K/UL — SIGNIFICANT CHANGE UP (ref 1.8–7.4)
NEUTROPHILS NFR BLD AUTO: 65.5 % — SIGNIFICANT CHANGE UP (ref 43–77)
NEUTROPHILS NFR BLD AUTO: 65.5 % — SIGNIFICANT CHANGE UP (ref 43–77)
NEUTS BAND # BLD: 4.5 % — SIGNIFICANT CHANGE UP (ref 0–8)
NEUTS BAND # BLD: 4.5 % — SIGNIFICANT CHANGE UP (ref 0–8)
PCO2 BLDV: 48 MMHG — SIGNIFICANT CHANGE UP (ref 42–55)
PCO2 BLDV: 48 MMHG — SIGNIFICANT CHANGE UP (ref 42–55)
PH BLDV: 7.41 — SIGNIFICANT CHANGE UP (ref 7.32–7.43)
PH BLDV: 7.41 — SIGNIFICANT CHANGE UP (ref 7.32–7.43)
PLAT MORPH BLD: NORMAL — SIGNIFICANT CHANGE UP
PLAT MORPH BLD: NORMAL — SIGNIFICANT CHANGE UP
PLATELET # BLD AUTO: 161 K/UL — SIGNIFICANT CHANGE UP (ref 150–400)
PLATELET # BLD AUTO: 161 K/UL — SIGNIFICANT CHANGE UP (ref 150–400)
PO2 BLDV: 40 MMHG — SIGNIFICANT CHANGE UP (ref 25–45)
PO2 BLDV: 40 MMHG — SIGNIFICANT CHANGE UP (ref 25–45)
POTASSIUM BLDV-SCNC: 4.1 MMOL/L — SIGNIFICANT CHANGE UP (ref 3.5–5.1)
POTASSIUM BLDV-SCNC: 4.1 MMOL/L — SIGNIFICANT CHANGE UP (ref 3.5–5.1)
POTASSIUM SERPL-MCNC: 4.1 MMOL/L — SIGNIFICANT CHANGE UP (ref 3.5–5.3)
POTASSIUM SERPL-MCNC: 4.1 MMOL/L — SIGNIFICANT CHANGE UP (ref 3.5–5.3)
POTASSIUM SERPL-SCNC: 4.1 MMOL/L — SIGNIFICANT CHANGE UP (ref 3.5–5.3)
POTASSIUM SERPL-SCNC: 4.1 MMOL/L — SIGNIFICANT CHANGE UP (ref 3.5–5.3)
PROT SERPL-MCNC: 7.8 G/DL — SIGNIFICANT CHANGE UP (ref 6–8.3)
PROT SERPL-MCNC: 7.8 G/DL — SIGNIFICANT CHANGE UP (ref 6–8.3)
PROTHROM AB SERPL-ACNC: 11.4 SEC — SIGNIFICANT CHANGE UP (ref 9.5–13)
PROTHROM AB SERPL-ACNC: 11.4 SEC — SIGNIFICANT CHANGE UP (ref 9.5–13)
RBC # BLD: 4.86 M/UL — SIGNIFICANT CHANGE UP (ref 4.2–5.8)
RBC # BLD: 4.86 M/UL — SIGNIFICANT CHANGE UP (ref 4.2–5.8)
RBC # FLD: 14.3 % — SIGNIFICANT CHANGE UP (ref 10.3–14.5)
RBC # FLD: 14.3 % — SIGNIFICANT CHANGE UP (ref 10.3–14.5)
RBC BLD AUTO: SIGNIFICANT CHANGE UP
RBC BLD AUTO: SIGNIFICANT CHANGE UP
RSV RNA NPH QL NAA+NON-PROBE: SIGNIFICANT CHANGE UP
RSV RNA NPH QL NAA+NON-PROBE: SIGNIFICANT CHANGE UP
SAO2 % BLDV: 69.1 % — SIGNIFICANT CHANGE UP (ref 67–88)
SAO2 % BLDV: 69.1 % — SIGNIFICANT CHANGE UP (ref 67–88)
SARS-COV-2 RNA SPEC QL NAA+PROBE: DETECTED
SARS-COV-2 RNA SPEC QL NAA+PROBE: DETECTED
SODIUM SERPL-SCNC: 136 MMOL/L — SIGNIFICANT CHANGE UP (ref 135–145)
SODIUM SERPL-SCNC: 136 MMOL/L — SIGNIFICANT CHANGE UP (ref 135–145)
VARIANT LYMPHS # BLD: 0.9 % — SIGNIFICANT CHANGE UP (ref 0–6)
VARIANT LYMPHS # BLD: 0.9 % — SIGNIFICANT CHANGE UP (ref 0–6)
WBC # BLD: 5.23 K/UL — SIGNIFICANT CHANGE UP (ref 3.8–10.5)
WBC # BLD: 5.23 K/UL — SIGNIFICANT CHANGE UP (ref 3.8–10.5)
WBC # FLD AUTO: 5.23 K/UL — SIGNIFICANT CHANGE UP (ref 3.8–10.5)
WBC # FLD AUTO: 5.23 K/UL — SIGNIFICANT CHANGE UP (ref 3.8–10.5)

## 2023-12-31 PROCEDURE — 85610 PROTHROMBIN TIME: CPT

## 2023-12-31 PROCEDURE — 71045 X-RAY EXAM CHEST 1 VIEW: CPT

## 2023-12-31 PROCEDURE — 85730 THROMBOPLASTIN TIME PARTIAL: CPT

## 2023-12-31 PROCEDURE — 85025 COMPLETE CBC W/AUTO DIFF WBC: CPT

## 2023-12-31 PROCEDURE — 82947 ASSAY GLUCOSE BLOOD QUANT: CPT

## 2023-12-31 PROCEDURE — 82435 ASSAY OF BLOOD CHLORIDE: CPT

## 2023-12-31 PROCEDURE — 87637 SARSCOV2&INF A&B&RSV AMP PRB: CPT

## 2023-12-31 PROCEDURE — 93005 ELECTROCARDIOGRAM TRACING: CPT

## 2023-12-31 PROCEDURE — 71045 X-RAY EXAM CHEST 1 VIEW: CPT | Mod: 26

## 2023-12-31 PROCEDURE — 99284 EMERGENCY DEPT VISIT MOD MDM: CPT

## 2023-12-31 PROCEDURE — 99285 EMERGENCY DEPT VISIT HI MDM: CPT | Mod: 25

## 2023-12-31 PROCEDURE — 82330 ASSAY OF CALCIUM: CPT

## 2023-12-31 PROCEDURE — 80053 COMPREHEN METABOLIC PANEL: CPT

## 2023-12-31 PROCEDURE — 84295 ASSAY OF SERUM SODIUM: CPT

## 2023-12-31 PROCEDURE — 87040 BLOOD CULTURE FOR BACTERIA: CPT

## 2023-12-31 PROCEDURE — 82803 BLOOD GASES ANY COMBINATION: CPT

## 2023-12-31 PROCEDURE — 83605 ASSAY OF LACTIC ACID: CPT

## 2023-12-31 PROCEDURE — 85018 HEMOGLOBIN: CPT

## 2023-12-31 PROCEDURE — 85014 HEMATOCRIT: CPT

## 2023-12-31 PROCEDURE — 84132 ASSAY OF SERUM POTASSIUM: CPT

## 2023-12-31 RX ORDER — ACETAMINOPHEN 500 MG
1000 TABLET ORAL ONCE
Refills: 0 | Status: COMPLETED | OUTPATIENT
Start: 2023-12-31 | End: 2023-12-31

## 2023-12-31 RX ORDER — SODIUM CHLORIDE 9 MG/ML
1000 INJECTION INTRAMUSCULAR; INTRAVENOUS; SUBCUTANEOUS ONCE
Refills: 0 | Status: COMPLETED | OUTPATIENT
Start: 2023-12-31 | End: 2023-12-31

## 2023-12-31 RX ORDER — ACETAMINOPHEN 500 MG
650 TABLET ORAL ONCE
Refills: 0 | Status: DISCONTINUED | OUTPATIENT
Start: 2023-12-31 | End: 2023-12-31

## 2023-12-31 RX ADMIN — Medication 100 MILLIGRAM(S): at 18:27

## 2023-12-31 RX ADMIN — SODIUM CHLORIDE 1000 MILLILITER(S): 9 INJECTION INTRAMUSCULAR; INTRAVENOUS; SUBCUTANEOUS at 16:09

## 2023-12-31 RX ADMIN — Medication 400 MILLIGRAM(S): at 16:10

## 2023-12-31 NOTE — ED PROVIDER NOTE - CLINICAL SUMMARY MEDICAL DECISION MAKING FREE TEXT BOX
Attending Penny Mancilla: 68-year-old male presenting with cough, congestion and fevers for the last couple of days.  Upon arrival patient hemodynamically stable and nontoxic-appearing.  Lungs are clear to auscultation on exam.  He denies a productive cough and lungs are clear making pneumonia less likely.  Concern for possible viral illness consider possible COVID versus flu versus RSV.  No evidence of respiratory distress with the emergency department.  Patient reports a mild right-sided headache.  No temporal tenderness to palpation patient denies any blurry vision.  Neuroexam nonfocal.  Suspect symptoms could be secondary to viral illness.  Will obtain labs, chest x-ray and viral panel and reevaluate.

## 2023-12-31 NOTE — ED PROVIDER NOTE - PHYSICAL EXAMINATION
Attending Penny Mancilla: Gen: NAD, heent: atrauamtic, eomi, perrla, mmm, , neck; nttp, no nuchal rigidity, chest: nttp, no crepitus, cv: rrr, no murmurs, lungs: ctab, abd: soft, nontender, nondistended, no peritoneal signs, no guarding, ext: wwp, neg homans, skin: no rash, neuro: awake and alert, following commands, speech clear, sensation and strength intact, no focal deficits no tempoal ttp

## 2023-12-31 NOTE — ED PROVIDER NOTE - OBJECTIVE STATEMENT
Attending Penny Mancilla: 68-year-old patient also presents yesterday coughing and sphincter feels like gas 68-year-old male with history of high blood pressure presenting with cough, congestion, intermittent fevers as well as right-sided headache for the last couple days.  Patient reports having a nonproductive cough and having some generalized myalgias and fatigue.  No sick contacts.  Patient reports having intermittent fevers.  Last took Tylenol 4 hours ago with some improvement.  No history of immunosuppression.  Denies any chest pain.  Denies any pain with inspiration.  Denies any sore throats or difficulty swallowing. Attending Penny Mancilla: 68-year-old male with history of high blood pressure presenting with cough, congestion, intermittent fevers as well as right-sided headache for the last couple days.  Patient reports having a nonproductive cough and having some generalized myalgias and fatigue.  No sick contacts.  Patient reports having intermittent fevers.  Last took Tylenol 4 hours ago with some improvement.  No history of immunosuppression.  Denies any chest pain.  Denies any pain with inspiration.  Denies any sore throats or difficulty swallowing.

## 2023-12-31 NOTE — ED PROVIDER NOTE - PROGRESS NOTE DETAILS
Attending Penny Mancilla: covid positive  which is likely the cause of symptoms. pt withotu evidence of respiratory distress or increased wob. is vaccinated for covid. Attending Penny Mancilla: ambulatory sat of 95%, pt ambulating without difficulty

## 2023-12-31 NOTE — ED ADULT NURSE NOTE - OBJECTIVE STATEMENT
68 year old male presented to ED with c/o of fever/cough/chills x3 days. hx HTN, enlarged prostate, and kidney removal due to cyst 8 years ago. pt denies CP, SOB, nausea/vomiting, numbness/tingling, dizziness, headache, blurred vision, neuro intact. pt a&ox3, lung sounds clear, heart rate regular, abdomen soft nontender nondistended to palp. skin intact. IV in LAC 20G and patent. Will continue to monitor and assess while offering support and reassurance.

## 2023-12-31 NOTE — ED ADULT NURSE NOTE - NSFALLUNIVINTERV_ED_ALL_ED
Bed/Stretcher in lowest position, wheels locked, appropriate side rails in place/Call bell, personal items and telephone in reach/Instruct patient to call for assistance before getting out of bed/chair/stretcher/Non-slip footwear applied when patient is off stretcher/[place] to call system/Physically safe environment - no spills, clutter or unnecessary equipment/Purposeful proactive rounding/Room/bathroom lighting operational, light cord in reach Bed/Stretcher in lowest position, wheels locked, appropriate side rails in place/Call bell, personal items and telephone in reach/Instruct patient to call for assistance before getting out of bed/chair/stretcher/Non-slip footwear applied when patient is off stretcher/[place] to call system/Physically safe environment - no spills, clutter or unnecessary equipment/Purposeful proactive rounding/Room/bathroom lighting operational, light cord in reach

## 2023-12-31 NOTE — ED PROVIDER NOTE - PATIENT PORTAL LINK FT
You can access the FollowMyHealth Patient Portal offered by [facility] by registering at the following website: [email or web address]. By joining [email or web address]’s FollowMyHealth portal, you will also be able to view your health information using other applications (apps) compatible with our system. You can access the FollowMyHealth Patient Portal offered by [facility] by registering at the following website: [email or web address]. By joining [employer]’s FollowMyHealth portal, you will also be able to view your health information using other applications (apps) compatible with our system.

## 2023-12-31 NOTE — ED PROVIDER NOTE - ATTENDING APP SHARED VISIT CONTRIBUTION OF CARE
Attending MD Penny Mancilla:   I personally have seen and examined this patient.  Physician assistant note reviewed and agree on plan of care and except where noted.  See HPI, PE, and MDM for details.

## 2024-01-06 LAB
CULTURE RESULTS: SIGNIFICANT CHANGE UP
SPECIMEN SOURCE: SIGNIFICANT CHANGE UP

## 2024-01-23 ENCOUNTER — APPOINTMENT (OUTPATIENT)
Age: 69
End: 2024-01-23
Payer: SELF-PAY

## 2024-01-23 PROCEDURE — D0140: CPT

## 2024-01-23 PROCEDURE — D0330 PANORAMIC RADIOGRAPHIC IMAGE: CPT

## 2024-03-11 ENCOUNTER — APPOINTMENT (OUTPATIENT)
Age: 69
End: 2024-03-11

## 2024-03-11 ENCOUNTER — APPOINTMENT (OUTPATIENT)
Age: 69
End: 2024-03-11
Payer: SELF-PAY

## 2024-03-11 PROCEDURE — D0150: CPT

## 2024-03-11 PROCEDURE — D0220: CPT

## 2024-03-11 PROCEDURE — D0274: CPT

## 2024-03-11 PROCEDURE — D0230: CPT

## 2024-03-13 ENCOUNTER — APPOINTMENT (OUTPATIENT)
Age: 69
End: 2024-03-13

## 2024-05-09 ENCOUNTER — APPOINTMENT (OUTPATIENT)
Age: 69
End: 2024-05-09
Payer: SELF-PAY

## 2024-05-09 PROCEDURE — D1110 PROPHYLAXIS - ADULT: CPT

## 2024-07-16 ENCOUNTER — APPOINTMENT (OUTPATIENT)
Age: 69
End: 2024-07-16
Payer: SELF-PAY

## 2024-07-16 PROCEDURE — D2392: CPT

## 2024-09-10 ENCOUNTER — APPOINTMENT (OUTPATIENT)
Age: 69
End: 2024-09-10
Payer: COMMERCIAL

## 2024-09-10 PROCEDURE — D1110 PROPHYLAXIS - ADULT: CPT

## 2025-01-31 DIAGNOSIS — K83.8 OTHER SPECIFIED DISEASES OF BILIARY TRACT: ICD-10-CM

## 2025-01-31 DIAGNOSIS — K83.1 OBSTRUCTION OF BILE DUCT: ICD-10-CM

## 2025-01-31 DIAGNOSIS — R79.89 OTHER SPECIFIED ABNORMAL FINDINGS OF BLOOD CHEMISTRY: ICD-10-CM

## 2025-01-31 DIAGNOSIS — E11.9 TYPE 2 DIABETES MELLITUS W/OUT COMPLICATIONS: ICD-10-CM

## 2025-02-08 ENCOUNTER — APPOINTMENT (OUTPATIENT)
Dept: ULTRASOUND IMAGING | Facility: IMAGING CENTER | Age: 70
End: 2025-02-08

## 2025-02-20 ENCOUNTER — OUTPATIENT (OUTPATIENT)
Dept: OUTPATIENT SERVICES | Facility: HOSPITAL | Age: 70
LOS: 1 days | End: 2025-02-20
Payer: COMMERCIAL

## 2025-02-20 ENCOUNTER — APPOINTMENT (OUTPATIENT)
Dept: ULTRASOUND IMAGING | Facility: IMAGING CENTER | Age: 70
End: 2025-02-20
Payer: COMMERCIAL

## 2025-02-20 DIAGNOSIS — Z90.5 ACQUIRED ABSENCE OF KIDNEY: Chronic | ICD-10-CM

## 2025-02-20 DIAGNOSIS — K83.8 OTHER SPECIFIED DISEASES OF BILIARY TRACT: ICD-10-CM

## 2025-02-20 DIAGNOSIS — Z98.89 OTHER SPECIFIED POSTPROCEDURAL STATES: Chronic | ICD-10-CM

## 2025-02-20 PROCEDURE — 76700 US EXAM ABDOM COMPLETE: CPT

## 2025-02-20 PROCEDURE — 76700 US EXAM ABDOM COMPLETE: CPT | Mod: 26

## 2025-03-13 ENCOUNTER — APPOINTMENT (OUTPATIENT)
Age: 70
End: 2025-03-13
Payer: COMMERCIAL

## 2025-03-13 PROCEDURE — D0120: CPT

## 2025-03-13 PROCEDURE — D0274: CPT

## 2025-03-13 PROCEDURE — D4355: CPT

## 2025-04-01 ENCOUNTER — APPOINTMENT (OUTPATIENT)
Age: 70
End: 2025-04-01
Payer: COMMERCIAL

## 2025-04-01 PROCEDURE — D2392: CPT

## 2025-04-10 NOTE — ED PROVIDER NOTE - DATE/TIME 1
Admission medication reconciliation POD1 
Transition of Care video discharge education - medication calendar given to patient  Bessy Chan, PharmD. Clinical Pharmacy Specialist, Department of Orthopedics Surgery at [facility].  
12-Nov-2018 15:27

## 2025-04-30 ENCOUNTER — NON-APPOINTMENT (OUTPATIENT)
Age: 70
End: 2025-04-30

## 2025-04-30 ENCOUNTER — APPOINTMENT (OUTPATIENT)
Dept: ALLERGY | Facility: CLINIC | Age: 70
End: 2025-04-30
Payer: COMMERCIAL

## 2025-04-30 VITALS
OXYGEN SATURATION: 97 % | DIASTOLIC BLOOD PRESSURE: 60 MMHG | HEART RATE: 94 BPM | SYSTOLIC BLOOD PRESSURE: 142 MMHG | TEMPERATURE: 98.7 F

## 2025-04-30 DIAGNOSIS — I10 ESSENTIAL (PRIMARY) HYPERTENSION: ICD-10-CM

## 2025-04-30 DIAGNOSIS — T48.5X5A CHRONIC RHINITIS: ICD-10-CM

## 2025-04-30 DIAGNOSIS — J31.0 CHRONIC RHINITIS: ICD-10-CM

## 2025-04-30 PROCEDURE — 99204 OFFICE O/P NEW MOD 45 MIN: CPT | Mod: 25

## 2025-04-30 PROCEDURE — 95004 PERQ TESTS W/ALRGNC XTRCS: CPT

## 2025-04-30 RX ORDER — PREDNISONE 10 MG/1
10 TABLET ORAL
Qty: 18 | Refills: 0 | Status: ACTIVE | COMMUNITY
Start: 2025-04-30 | End: 1900-01-01

## 2025-05-01 LAB — GLUCOSE SERPL-MCNC: 105 MG/DL

## 2025-05-08 ENCOUNTER — APPOINTMENT (OUTPATIENT)
Dept: ALLERGY | Facility: CLINIC | Age: 70
End: 2025-05-08
Payer: COMMERCIAL

## 2025-05-08 ENCOUNTER — RX RENEWAL (OUTPATIENT)
Age: 70
End: 2025-05-08

## 2025-05-08 PROCEDURE — 31231 NASAL ENDOSCOPY DX: CPT

## 2025-05-08 PROCEDURE — 99213 OFFICE O/P EST LOW 20 MIN: CPT | Mod: 25

## 2025-05-08 RX ORDER — FLUTICASONE PROPIONATE 50 UG/1
50 SPRAY, METERED NASAL DAILY
Qty: 48 | Refills: 0 | Status: ACTIVE | COMMUNITY
Start: 2025-05-08 | End: 1900-01-01

## 2025-06-16 ENCOUNTER — APPOINTMENT (OUTPATIENT)
Dept: SURGERY | Facility: CLINIC | Age: 70
End: 2025-06-16
Payer: MEDICARE

## 2025-06-16 VITALS
DIASTOLIC BLOOD PRESSURE: 72 MMHG | HEART RATE: 83 BPM | SYSTOLIC BLOOD PRESSURE: 132 MMHG | WEIGHT: 143 LBS | TEMPERATURE: 98.2 F | BODY MASS INDEX: 21.18 KG/M2 | HEIGHT: 69 IN

## 2025-06-16 PROCEDURE — 99203 OFFICE O/P NEW LOW 30 MIN: CPT | Mod: 25

## 2025-06-16 PROCEDURE — 45300 PROCTOSIGMOIDOSCOPY DX: CPT

## 2025-06-24 ENCOUNTER — OUTPATIENT (OUTPATIENT)
Dept: OUTPATIENT SERVICES | Facility: HOSPITAL | Age: 70
LOS: 1 days | End: 2025-06-24
Payer: MEDICARE

## 2025-06-24 VITALS
RESPIRATION RATE: 17 BRPM | HEIGHT: 69 IN | OXYGEN SATURATION: 96 % | SYSTOLIC BLOOD PRESSURE: 133 MMHG | DIASTOLIC BLOOD PRESSURE: 76 MMHG | WEIGHT: 145.95 LBS | TEMPERATURE: 98 F | HEART RATE: 81 BPM

## 2025-06-24 DIAGNOSIS — K64.3 FOURTH DEGREE HEMORRHOIDS: ICD-10-CM

## 2025-06-24 DIAGNOSIS — N40.0 BENIGN PROSTATIC HYPERPLASIA WITHOUT LOWER URINARY TRACT SYMPTOMS: ICD-10-CM

## 2025-06-24 DIAGNOSIS — Z98.89 OTHER SPECIFIED POSTPROCEDURAL STATES: Chronic | ICD-10-CM

## 2025-06-24 DIAGNOSIS — Z01.818 ENCOUNTER FOR OTHER PREPROCEDURAL EXAMINATION: ICD-10-CM

## 2025-06-24 DIAGNOSIS — K64.9 UNSPECIFIED HEMORRHOIDS: ICD-10-CM

## 2025-06-24 DIAGNOSIS — I10 ESSENTIAL (PRIMARY) HYPERTENSION: ICD-10-CM

## 2025-06-24 DIAGNOSIS — Z90.5 ACQUIRED ABSENCE OF KIDNEY: Chronic | ICD-10-CM

## 2025-06-24 PROBLEM — R42 DIZZINESS AND GIDDINESS: Chronic | Status: INACTIVE | Noted: 2022-07-21 | Resolved: 2025-06-24

## 2025-06-24 LAB
ANION GAP SERPL CALC-SCNC: 7 MMOL/L — SIGNIFICANT CHANGE UP (ref 5–17)
BUN SERPL-MCNC: 23 MG/DL — SIGNIFICANT CHANGE UP (ref 7–23)
CALCIUM SERPL-MCNC: 9.2 MG/DL — SIGNIFICANT CHANGE UP (ref 8.5–10.1)
CHLORIDE SERPL-SCNC: 106 MMOL/L — SIGNIFICANT CHANGE UP (ref 96–108)
CO2 SERPL-SCNC: 27 MMOL/L — SIGNIFICANT CHANGE UP (ref 22–31)
CREAT SERPL-MCNC: 1.4 MG/DL — HIGH (ref 0.5–1.3)
EGFR: 54 ML/MIN/1.73M2 — LOW
EGFR: 54 ML/MIN/1.73M2 — LOW
GLUCOSE SERPL-MCNC: 154 MG/DL — HIGH (ref 70–99)
HCT VFR BLD CALC: 43 % — SIGNIFICANT CHANGE UP (ref 39–50)
HGB BLD-MCNC: 14.1 G/DL — SIGNIFICANT CHANGE UP (ref 13–17)
MCHC RBC-ENTMCNC: 28.9 PG — SIGNIFICANT CHANGE UP (ref 27–34)
MCHC RBC-ENTMCNC: 32.8 G/DL — SIGNIFICANT CHANGE UP (ref 32–36)
MCV RBC AUTO: 88.1 FL — SIGNIFICANT CHANGE UP (ref 80–100)
NRBC BLD AUTO-RTO: 0 /100 WBCS — SIGNIFICANT CHANGE UP (ref 0–0)
PLATELET # BLD AUTO: 178 K/UL — SIGNIFICANT CHANGE UP (ref 150–400)
POTASSIUM SERPL-MCNC: 3.7 MMOL/L — SIGNIFICANT CHANGE UP (ref 3.5–5.3)
POTASSIUM SERPL-SCNC: 3.7 MMOL/L — SIGNIFICANT CHANGE UP (ref 3.5–5.3)
RBC # BLD: 4.88 M/UL — SIGNIFICANT CHANGE UP (ref 4.2–5.8)
RBC # FLD: 14.1 % — SIGNIFICANT CHANGE UP (ref 10.3–14.5)
SODIUM SERPL-SCNC: 140 MMOL/L — SIGNIFICANT CHANGE UP (ref 135–145)
WBC # BLD: 5.16 K/UL — SIGNIFICANT CHANGE UP (ref 3.8–10.5)
WBC # FLD AUTO: 5.16 K/UL — SIGNIFICANT CHANGE UP (ref 3.8–10.5)

## 2025-06-24 PROCEDURE — 93010 ELECTROCARDIOGRAM REPORT: CPT

## 2025-06-24 NOTE — H&P PST ADULT - NSICDXPASTMEDICALHX_GEN_ALL_CORE_FT
PAST MEDICAL HISTORY:  Biliary stricture     BPH (benign prostatic hyperplasia)     Hypertension

## 2025-06-24 NOTE — H&P PST ADULT - PROBLEM SELECTOR PLAN 1
hemorrhoidectomy.  Pre op instructions:   Hold OTC supplements.    Medical eval needed  May take Tylenol for pain or headache if needed.    NPO after 11pm to the morning of surgery.   Antibacterial wash instructions given for the morning of surgery  Patient verbalized understanding.

## 2025-06-24 NOTE — H&P PST ADULT - HISTORY OF PRESENT ILLNESS
68 y/o male with h/o of hemorrhoids for a long time, bleeding on and off, not heavy. seen by Dr Steele , scheduled for hemorrhoidectomy on 7/3/25. Pre op testing today.   70 y/o male PMH of HTN, BPH, left kidney cancer , s/p nephrectomy, acid reflux, with h/o of hemorrhoids for a long time, bleeding on and off, not heavy. seen by Dr Steele , scheduled for hemorrhoidectomy on 7/3/25. Pre op testing today.

## 2025-07-03 ENCOUNTER — APPOINTMENT (OUTPATIENT)
Dept: SURGERY | Facility: HOSPITAL | Age: 70
End: 2025-07-03

## 2025-07-03 ENCOUNTER — OUTPATIENT (OUTPATIENT)
Dept: OUTPATIENT SERVICES | Facility: HOSPITAL | Age: 70
LOS: 1 days | End: 2025-07-03
Payer: MEDICARE

## 2025-07-03 ENCOUNTER — TRANSCRIPTION ENCOUNTER (OUTPATIENT)
Age: 70
End: 2025-07-03

## 2025-07-03 ENCOUNTER — RESULT REVIEW (OUTPATIENT)
Age: 70
End: 2025-07-03

## 2025-07-03 VITALS
RESPIRATION RATE: 15 BRPM | TEMPERATURE: 98 F | HEIGHT: 69 IN | WEIGHT: 141.98 LBS | SYSTOLIC BLOOD PRESSURE: 138 MMHG | DIASTOLIC BLOOD PRESSURE: 73 MMHG | HEART RATE: 81 BPM | OXYGEN SATURATION: 98 %

## 2025-07-03 VITALS
OXYGEN SATURATION: 98 % | HEART RATE: 75 BPM | DIASTOLIC BLOOD PRESSURE: 69 MMHG | SYSTOLIC BLOOD PRESSURE: 109 MMHG | TEMPERATURE: 98 F | RESPIRATION RATE: 15 BRPM

## 2025-07-03 DIAGNOSIS — Z90.5 ACQUIRED ABSENCE OF KIDNEY: Chronic | ICD-10-CM

## 2025-07-03 DIAGNOSIS — Z98.89 OTHER SPECIFIED POSTPROCEDURAL STATES: Chronic | ICD-10-CM

## 2025-07-03 PROCEDURE — 46250 REMOVE EXT HEM GROUPS 2+: CPT | Mod: GC

## 2025-07-03 DEVICE — SURGICEL 2 X 14": Type: IMPLANTABLE DEVICE | Status: FUNCTIONAL

## 2025-07-03 RX ORDER — SODIUM CHLORIDE 9 G/1000ML
1000 INJECTION, SOLUTION INTRAVENOUS
Refills: 0 | Status: DISCONTINUED | OUTPATIENT
Start: 2025-07-03 | End: 2025-07-04

## 2025-07-03 RX ORDER — FENTANYL CITRATE-0.9 % NACL/PF 100MCG/2ML
25 SYRINGE (ML) INTRAVENOUS
Refills: 0 | Status: DISCONTINUED | OUTPATIENT
Start: 2025-07-03 | End: 2025-07-04

## 2025-07-03 RX ORDER — OXYCODONE HYDROCHLORIDE 30 MG/1
1 TABLET ORAL
Qty: 16 | Refills: 0
Start: 2025-07-03 | End: 2025-07-06

## 2025-07-03 NOTE — ASU DISCHARGE PLAN (ADULT/PEDIATRIC) - FINANCIAL ASSISTANCE
[facility] provides services at a reduced cost to those who are determined to be eligible through [facility]’s financial assistance program. Information regarding [facility]’s financial assistance program can be found by going to [email or web address] or by calling [phone].

## 2025-07-03 NOTE — ASU PATIENT PROFILE, ADULT - NSICDXPASTMEDICALHX_GEN_ALL_CORE_FT
PAST MEDICAL HISTORY:  Biliary stricture     BPH (benign prostatic hyperplasia)     Cancer of kidney     Hypertension

## 2025-07-03 NOTE — ASU DISCHARGE PLAN (ADULT/PEDIATRIC) - NS MD DC FALL RISK RISK
For information on Fall & Injury Prevention, visit: [email or web address] OR  [email or web address] OR  https://www.cdc.gov/steadi/patient.html

## 2025-07-03 NOTE — ASU DISCHARGE PLAN (ADULT/PEDIATRIC) - ASU DC SPECIAL INSTRUCTIONSFT
sitz baths as needed for comfort    keep stools soft with colace and laxatives as needed    f/u 1 week with Dr. Steele    take 1000mg tylenol + 400mg motrin or advil every 6hrs as needed for pain and add 1 oxycodone if needed for severe pain sitz baths as needed for comfort    keep stools soft with colace and laxatives as needed    f/u 1 week with Dr. Steele    take 1000mg tylenol  every 6hrs as needed for pain and add 1 oxycodone if needed for severe pain

## 2025-07-03 NOTE — ASU PATIENT PROFILE, ADULT - PATIENT'S GENDER IDENTITY
[facility] MEDICINE  DISCHARGE SUMMARY     Primary Care Physician: Micheal Blake  Admission Date: 6/13/2022   Discharge Provider: Rozina Sorto MD Discharge Date: 6/21/2022   Diet:   Active Diet and Nourishment Order   Procedures     Snacks/Supplements Adult: Ensure Enlive; With Meals     Combination Diet Easy to Chew (level 7); Thin Liquids (level 0)     Diet       Code Status: No CPR- Do NOT Intubate   Activity: DCACTIVITY: Activity as tolerated        Condition at Discharge: Fair     REASON FOR PRESENTATION(See Admission Note for Details)   S/p fall, type II odontoid cervical fracture, subdural hematoma    PRINCIPAL & ACTIVE DISCHARGE DIAGNOSES     Active Problems:    Laceration of forehead, initial encounter    Skin tear of right hand without complication, initial encounter    Chronic pain of both knees    Closed odontoid fracture with type II morphology (H)    Bilateral chronic intracranial subdural hematoma (H)      PENDING LABS     Unresulted Labs Ordered in the Past 30 Days of this Admission     No orders found from 5/14/2022 to 6/14/2022.            PROCEDURES ( this hospitalization only)          RECOMMENDATIONS TO OUTPATIENT PROVIDER FOR F/U VISIT     Follow-up Appointments     Follow Up and recommended labs and tests      Follow up with Nursing home physician.  No follow up labs or test are   needed.                 DISPOSITION     Skilled Nursing Facility    SUMMARY OF HOSPITAL COURSE:    89-year-old male with past medical history of hyperlipidemia, CVA, paroxysmal A. fib, SDH 1/2022, COPD/emphysema, BPH who presented after a fall, was found to have bilateral SDH with acute SDH on the left, type II odontoid fracture, likely acute T3 compression fracture.  Left SDH slightly larger on repeat CT, but there is no significant brain compression or midline shift.  Family would not want any surgical interventions for the patient, he is DNR/DNI.  Hospital course  complicated by significant confusion and acute urinary retention   Bilateral mixed density SDH.    - Left SDH slightly larger on repeat CT 6/14 and 6/15 but no significant brain compression or midline shift.    SBP goal less than 150.  Hold all blood thinners.  Continue PT/OT - can arrange PT/OT at the [facility], repeat head CT stable, lethargy improved    Type II odontoid fracture.    - [place] J collar at all times, trey for showers recommended by neurosurgery.   - scheduled p.o. Tylenol, try to minimize narcotics due to increased confusion, lethargy    Dysphagia   -with weak symptoms and signs of aspiration on speech therapy eval 6/14.  Per SLP did much better on VFSS: Can have level 5 soft diet and thin liquids.  Monitor oral intake   Acute metabolic encephalopathy  -improving.  Suspect multifactorial in the setting of SDH, opioids, being in unfamiliar environment.   It has been 48 hours since patient has been off one-to-one observation.  Patient is now behaviorally appropriate.  Continue supportive cares.  Seroquel as needed for agitation   T3 compression fracture.    No brace was recommended by neurosurgery, no complaints of thoracic spine pain   Bilateral knee pain, advanced arthritis with bilateral effusion.    -S/p aspiration and corticosteroid injection.  Appreciate orthopedic surgery    Right conjunctival hemorrhage.    - Continue artificial tears 4 times daily,  erythromycin ointment if there is possible corneal abrasion due to persistent discomfort in the right eye.  Outpatient follow-up with ophthalmology    Acute urinary retention.    -Voiding trial today.  Bladder scan intermittently.     Paroxysmal A. Fib.   -  Not on any rate control medications.  No anticoagulation    Patient is clinically and hemodynamically stable enough to be discharged to TCU today.  Cleared by neurosurgery team.  Medication reconciliation has been done.  Discharge Medications with Med changes:     Current Discharge Medication List       START taking these medications    Details   carboxymethylcellulose PF (REFRESH PLUS) 0.5 % ophthalmic solution Place 2 drops into both eyes 4 times daily    Associated Diagnoses: Dry eyes      diclofenac (VOLTAREN) 1 % topical gel Apply 2 g topically 3 times daily    Associated Diagnoses: Primary osteoarthritis of both knees      QUEtiapine (SEROQUEL) 25 MG tablet Take 1 tablet (25 mg) by mouth every 6 hours as needed    Associated Diagnoses: Delirium         CONTINUE these medications which have NOT CHANGED    Details   acetaminophen (TYLENOL) 500 MG tablet Take 1,000 mg by mouth 3 times daily as needed Maximum of 4000 mg in 24 hours      albuterol (PROAIR HFA/PROVENTIL HFA/VENTOLIN HFA) 108 (90 Base) MCG/ACT inhaler Inhale 2 puffs into the lungs every 4 hours as needed for shortness of breath / dyspnea or wheezing      albuterol (PROVENTIL) (2.5 MG/3ML) 0.083% neb solution TAKE 1 VIAL BY NEBULIZATION EVERY 6 HOURS AS NEEDED FOR SHORTNESS OF BREATH/DYSPNEA OR WHEEZING  Qty: 360 mL, Refills: 11    Associated Diagnoses: Centrilobular emphysema (H)      ANORO ELLIPTA 62.5-25 MCG/INH oral inhaler INHALE ONE PUFF BY MOUTH ONCE DAILY  Qty: 60 each, Refills: 8    Associated Diagnoses: Centrilobular emphysema (H)      bisacodyl (DULCOLAX) 10 MG suppository Place 10 mg rectally daily as needed for constipation      Cholecalciferol (VITAMIN D) 1000 UNIT capsule Take 1 capsule by mouth daily.      Cyanocobalamin 1000 MCG TABS Take 1,000 mcg by mouth daily      oxyCODONE (ROXICODONE) 5 MG tablet Take 2.5 mg by mouth 2 times daily as needed for severe pain      polyethylene glycol (MIRALAX) 17 g packet Take 1 packet by mouth daily      simvastatin (ZOCOR) 10 MG tablet TAKE ONE TABLET BY MOUTH AT BEDTIME.  Qty: 90 tablet, Refills: 2    Associated Diagnoses: Hyperlipidemia LDL goal <130      order for DME Oxygen for home use. Liters per minute: 1 per nasal cannula. Frequency of use: With activity;. Length of need: 99.                    Rationale for medication changes:              Consults       WOUND OSTOMY CONTINENCE NURSE  IP CONSULT  PAIN MANAGEMENT ADULT IP CONSULT  NEUROSURGERY IP CONSULT  ORTHOPEDIC SURGERY IP CONSULT  ORTHOSIS BRACE IP CONSULT  SOCIAL WORK IP CONSULT  SPEECH LANGUAGE PATH ADULT IP CONSULT  PHYSICAL THERAPY ADULT IP CONSULT  ORTHOPEDIC SURGERY IP CONSULT  PHARMACY IP CONSULT    Immunizations given this encounter     Most Recent Immunizations   Administered Date(s) Administered     COVID-19,PF,Pfizer (12+ Yrs) 11/09/2021     Influenza (High Dose) 3 valent vaccine 10/09/2019     Influenza (IIV3) PF 09/23/2011     Influenza, Quad, High Dose, Pf, 65yr+ (Fluzone HD) 09/30/2021     Pneumo Conj 13-V (2010&after) 11/03/2015     Pneumococcal 23 valent 09/22/2010     Pneumococcal, Unspecified 02/01/2001     TD (ADULT, 7+) 09/22/2010     Td (Adult), Adsorbed 09/09/2000     Tdap (Adult) Unspecified Formulation 07/31/2006     Zoster vaccine, live 08/24/2007           Anticoagulation Information      Recent INR results: No results for input(s): INR in the last 168 hours.  Warfarin doses (if applicable) or name of other anticoagulant:       SIGNIFICANT IMAGING FINDINGS     Results for orders placed or performed during the hospital encounter of 06/13/22   Head CT w/o contrast   Result Value Ref Range    Radiologist flags (AA)      Intracranial hemorrhage and type II odontoid fracture    Impression    IMPRESSION:  HEAD CT:  1.  Left frontal scalp hematoma and laceration. Bilateral mixed attenuation subdural hematomas have decreased in size compared to the prior CT head. The right subdural demonstrates more posterior location and layering hyperdensity which could represent   some recent subacute hemorrhage though there is no mass effect.    2.  Otherwise stable age-related changes.    CERVICAL SPINE CT:  1.  Minimally displaced type II odontoid fracture which is age-indeterminate. Of note, there is no prevertebral edema though  this is likely acute to subacute in nature.    2.  Mild superior endplate height loss at T3 is age-indeterminate. Correlate for tenderness. Additionally, CT thoracic spine could be obtained for further evaluation.      [Critical Result: Intracranial hemorrhage and type II odontoid fracture]    Finding was identified on 6/13/2022 3:45 AM.     Dr. Brown was contacted by me on 6/13/2022 3:55 AM and verbalized understanding of the critical result.    Cervical spine CT w/o contrast   Result Value Ref Range    Radiologist flags (AA)      Intracranial hemorrhage and type II odontoid fracture    Impression    IMPRESSION:  HEAD CT:  1.  Left frontal scalp hematoma and laceration. Bilateral mixed attenuation subdural hematomas have decreased in size compared to the prior CT head. The right subdural demonstrates more posterior location and layering hyperdensity which could represent   some recent subacute hemorrhage though there is no mass effect.    2.  Otherwise stable age-related changes.    CERVICAL SPINE CT:  1.  Minimally displaced type II odontoid fracture which is age-indeterminate. Of note, there is no prevertebral edema though this is likely acute to subacute in nature.    2.  Mild superior endplate height loss at T3 is age-indeterminate. Correlate for tenderness. Additionally, CT thoracic spine could be obtained for further evaluation.      [Critical Result: Intracranial hemorrhage and type II odontoid fracture]    Finding was identified on 6/13/2022 3:45 AM.     Dr. Brown was contacted by me on 6/13/2022 3:55 AM and verbalized understanding of the critical result.    CT Chest/Abdomen/Pelvis w Contrast    Impression    IMPRESSION:  1.  Slightly degraded due to motion artifact. Mild emphysematous changes and rhonchi. Calcified pleural plaques bilaterally indicative of remote asbestos exposure.    2.  Mild cardiac enlargement. Dense coronary artery calcifications. No pericardial effusion. Atherosclerotic and  ectatic distal abdominal aorta. No aneurysm.    3.  Cholelithiasis without biliary dilatation or adjacent inflammation. Evidence of remote granulomatous disease.    4.  Colonic diverticulosis without acute inflammation. Fat-containing left inguinal hernia.    5.  Degenerative changes both shoulders, spine and joints of the pelvis. Mild left convex lumbar curve.                 XR Knee Bilateral 3 Views    Impression    IMPRESSION:   RIGHT KNEE: There is a moderate-sized joint effusion versus synovitis. Advanced degenerative changes are seen most marked in the medial compartment. Advanced vascular calcification. Significant findings of chondrocalcinosis. The exam is otherwise   negative with no obvious acute bony finding such as fracture or dislocation identified..    LEFT KNEE: Advanced findings of chondrocalcinosis and degenerative changes, most marked in the medial compartment. Advanced vascular calcification. No acute bony finding such as fracture or dislocation seen. No significant joint effusion.   XR Chest Port 1 View    Impression    IMPRESSION: No acute cardiopulmonary findings. Stable significant scattered bilateral calcified pleural plaques. Advanced degenerative changes left shoulder. Partially calcified thoracic aorta.   XR Hand Right G/E 3 Views    Impression    IMPRESSION: There are significant multifocal areas of DJD seen, most marked at the first CMC joint. Findings of chondrocalcinosis in the wrist. No obvious acute bony finding such as fracture or dislocation identified.   Head CT w/o contrast    Impression    IMPRESSION:  1.  Small component of new increased attenuation in the occipitoparietal component of the left convexity subdural hematoma. This may reflect interval acute hemorrhage without significant overall enlargement of the hematoma. Close follow-up recommended.  2.  Stable mixed attenuation right-sided subdural hematoma.  3.  No new intracranial mass effect.  4.  Age-related and chronic  ischemic changes as above.  5.  Redemonstrated left frontal scalp hematoma/laceration.    Results discussed with Radha Lima on 6/13/2022 12:18 PM.   MR Cervical Spine w/o Contrast    Impression    IMPRESSION:  1.  Type II odontoid fracture with slight dorsal angulation of the cephalad fracture fragment as seen previously. This is new compared to 01/20/2022. Foramen magnum remains widely patent.    2.  Mild compression deformity anterior superior endplate of T3, new compared to 01/20/2022. No retropulsion or associated canal stenosis.    3.  Although the STIR images are degraded by motion artifact, there appears to be edema within the cephalad fracture fragment at C2 and more questionably along the superior endplate of T3 suggesting that these are recent/active fractures.    4.  Otherwise moderate lower cervical spondylosis with mild spinal canal narrowing at C4-C5. Multilevel foraminal narrowing, as described, with moderate left C3-C4 and C5-C6 and mild to moderate bilateral C7-T1 foraminal stenosis.     XR Cervical Spine 2/3 Views    Impression    IMPRESSION: Evaluation is limited by osteopenia and overlying cloth possibly representing clothing or bedsheets. Minimal degenerative anterolisthesis of C3 upon C4 and C4 upon C5. Alignment otherwise normal. Vertebral body heights normal. No evidence for   fracture.     XR Thoracic Spine 2 Views    Impression    IMPRESSION: This study is markedly limited by osteopenia and overlying soft tissue markings. Compression fracture deformities cannot be excluded.    CT Head w/o Contrast    Impression    IMPRESSION:    1.  Slight interval increased size of a mixed density left cerebral convexity subdural hematoma.  2.  Stable mixed density right cerebral convexity subdural hematoma.  3.  Stable local mass effect without significant midline shift.  4.  Stable chronic infarct in the left lentiform nucleus extending into the adjacent internal capsule and left corona  radiata.  5.  Stable mild chronic small vessel ischemic change.   CT Head w/o Contrast    Impression    IMPRESSION:  1.  Slight interval increase in size of the left cerebral convexity subdural hematoma without significant change in amount of hyperattenuating blood products.  2.  Mildly decreased hyperattenuating blood products in the right cerebral convexity subdural hematoma without significant change in size.       CT Head w/o Contrast    Impression    IMPRESSION:  1.  No significant interval change. Stable bilateral mixed density subdural hemorrhages.   CT Head w/o Contrast    Impression    IMPRESSION:  1.  Stable bilateral mixed-attenuation subdural hematomas.  2.  No evidence of new hemorrhage, new mass effect, or acute vascular territorial infarction.       SIGNIFICANT LABORATORY FINDINGS     Most Recent 3 CBC's:Recent Labs   Lab Test 06/19/22  0530 06/18/22  0547 06/13/22  0200   WBC 4.7 4.7 9.1   HGB 10.4* 9.6* 10.1*   MCV 98 98 97    208 263     Most Recent 3 BMP's:Recent Labs   Lab Test 06/20/22  1344 06/19/22  0530 06/18/22  0547 06/16/22  1509 06/15/22  0554   NA  --  141 142  --  140   POTASSIUM  --  3.8 3.7  --  4.0   CHLORIDE  --  107 108*  --  107   CO2  --  30 28  --  27   BUN  --  13 18  --  14   CR  --  0.51* 0.53*  --  0.60*   ANIONGAP  --  4* 6  --  6   MADELIN  --  8.7 8.6  --  9.2   * 104 103   < > 190*    < > = values in this interval not displayed.     Most Recent 2 LFT's:Recent Labs   Lab Test 11/05/21  1205 08/04/20  0913 11/03/15  1202 10/07/14  1408   AST  --   --   --  25   ALT 23 19   < > 26    < > = values in this interval not displayed.     Most Recent 3 INR's:Recent Labs   Lab Test 06/13/22  0423   INR 1.41*     Most Recent INR's and Anticoagulation Dosing History:  Anticoagulation Dose History     Recent Dosing and Labs Latest Ref Rng & Units 6/13/2022    INR 0.85 - 1.15 1.41(H)        Most Recent 3 Creatinines:Recent Labs   Lab Test 06/19/22  0530 06/18/22  0547  06/15/22  0554   CR 0.51* 0.53* 0.60*     Most Recent 3 Hemoglobins:Recent Labs   Lab Test 06/19/22  0530 06/18/22  0547 06/13/22  0200   HGB 10.4* 9.6* 10.1*     Most Recent 3 Troponin's:No lab results found.        Discharge Orders        General info for SNF    Length of Stay Estimate: Long Term Care  Condition at Discharge: Stable  Level of care:skilled   Rehabilitation Potential: Good  Admission H&P remains valid and up-to-date: Yes  Recent Chemotherapy: N/A  Use Nursing Home Standing Orders: Yes     Follow Up and recommended labs and tests    Follow up with Nursing home physician.  No follow up labs or test are needed.     Reason for your hospital stay    S/p fall, type II odontoid cervical fracture, subdural hematoma     Activity - Up with nursing assistance     Fall precautions     Seizure precautions     Diet    Follow this diet upon discharge: Orders Placed This Encounter      Snacks/Supplements Adult: Ensure Enlive; With Meals      Combination Diet Easy to Chew (level 7); Thin Liquids (level 0)       Examination   Physical Exam   Temp:  [97.5  F (36.4  C)-98.3  F (36.8  C)] 97.5  F (36.4  C)  Pulse:  [58-80] 64  Resp:  [16-20] 16  BP: (118-156)/(65-75) 136/65  SpO2:  [96 %-98 %] 98 %  Wt Readings from Last 1 Encounters:   06/20/22 58.8 kg (129 lb 11.2 oz)     GEN: Alert and orientedX2 . Not in acute distress  HEENT: Superficial laceration on left side of forehead with clean dressing              Sclera- anicteric              Mucous membrane- moist and pink  CHEST: Clear to auscultation bilaterally  HEART: S1S2 heard  ABDOMEN: Soft. Non-tender, non-distended. No organomegaly. No guarding or rigidity. Bowel sounds- active  Extremities: No pedal oedema  CNS: No focal neurological deficit. No involuntary movements.  Neck collar in place          Please see EMR for more detailed significant labs, imaging, consultant notes etc.    Rozina PRATHER MD, personally saw the patient today and spent greater  than 30 minutes discharging this patient.    Rozina Sorto MD  [facility]    CC:Micheal Blake   Male

## 2025-07-05 ENCOUNTER — EMERGENCY (EMERGENCY)
Facility: HOSPITAL | Age: 70
LOS: 1 days | End: 2025-07-05
Attending: EMERGENCY MEDICINE
Payer: MEDICARE

## 2025-07-05 VITALS
WEIGHT: 145.06 LBS | HEART RATE: 89 BPM | DIASTOLIC BLOOD PRESSURE: 87 MMHG | HEIGHT: 69 IN | OXYGEN SATURATION: 97 % | SYSTOLIC BLOOD PRESSURE: 168 MMHG | TEMPERATURE: 98 F | RESPIRATION RATE: 17 BRPM

## 2025-07-05 DIAGNOSIS — Z90.5 ACQUIRED ABSENCE OF KIDNEY: Chronic | ICD-10-CM

## 2025-07-05 DIAGNOSIS — Z98.89 OTHER SPECIFIED POSTPROCEDURAL STATES: Chronic | ICD-10-CM

## 2025-07-05 PROCEDURE — 99285 EMERGENCY DEPT VISIT HI MDM: CPT | Mod: 25

## 2025-07-05 NOTE — ED ADULT NURSE NOTE - OBJECTIVE STATEMENT
The pt is a 69 Y M with a PMH of Hermoid removal. pt is AOx4 breathing evenly on room air and rafaela to speak in full sentences. PT came  in for co constipation for 2 days and not relief. pt took at home laxatives with no relief. pt denies blood in stool noted. pt heath head ache cehst pain sob blood in stool at this time.pt is able to walk on his own at this time. with wife at bedside

## 2025-07-06 VITALS
OXYGEN SATURATION: 98 % | DIASTOLIC BLOOD PRESSURE: 78 MMHG | HEART RATE: 79 BPM | TEMPERATURE: 98 F | SYSTOLIC BLOOD PRESSURE: 139 MMHG | RESPIRATION RATE: 18 BRPM

## 2025-07-06 PROBLEM — C64.9 MALIGNANT NEOPLASM OF UNSPECIFIED KIDNEY, EXCEPT RENAL PELVIS: Chronic | Status: ACTIVE | Noted: 2025-07-03

## 2025-07-06 LAB
ALBUMIN SERPL ELPH-MCNC: 3.8 G/DL — SIGNIFICANT CHANGE UP (ref 3.3–5)
ALP SERPL-CCNC: 56 U/L — SIGNIFICANT CHANGE UP (ref 40–120)
ALT FLD-CCNC: 15 U/L — SIGNIFICANT CHANGE UP (ref 10–45)
ANION GAP SERPL CALC-SCNC: 10 MMOL/L — SIGNIFICANT CHANGE UP (ref 5–17)
AST SERPL-CCNC: 11 U/L — SIGNIFICANT CHANGE UP (ref 10–40)
BASOPHILS # BLD AUTO: 0.05 K/UL — SIGNIFICANT CHANGE UP (ref 0–0.2)
BASOPHILS NFR BLD AUTO: 0.6 % — SIGNIFICANT CHANGE UP (ref 0–2)
BILIRUB SERPL-MCNC: 0.3 MG/DL — SIGNIFICANT CHANGE UP (ref 0.2–1.2)
BUN SERPL-MCNC: 20 MG/DL — SIGNIFICANT CHANGE UP (ref 7–23)
CALCIUM SERPL-MCNC: 9 MG/DL — SIGNIFICANT CHANGE UP (ref 8.4–10.5)
CHLORIDE SERPL-SCNC: 101 MMOL/L — SIGNIFICANT CHANGE UP (ref 96–108)
CO2 SERPL-SCNC: 26 MMOL/L — SIGNIFICANT CHANGE UP (ref 22–31)
CREAT SERPL-MCNC: 1.44 MG/DL — HIGH (ref 0.5–1.3)
EGFR: 52 ML/MIN/1.73M2 — LOW
EGFR: 52 ML/MIN/1.73M2 — LOW
EOSINOPHIL # BLD AUTO: 0.18 K/UL — SIGNIFICANT CHANGE UP (ref 0–0.5)
EOSINOPHIL NFR BLD AUTO: 2.3 % — SIGNIFICANT CHANGE UP (ref 0–6)
GLUCOSE SERPL-MCNC: 126 MG/DL — HIGH (ref 70–99)
HCT VFR BLD CALC: 40.7 % — SIGNIFICANT CHANGE UP (ref 39–50)
HGB BLD-MCNC: 12.9 G/DL — LOW (ref 13–17)
IMM GRANULOCYTES # BLD AUTO: 0.03 K/UL — SIGNIFICANT CHANGE UP (ref 0–0.07)
IMM GRANULOCYTES NFR BLD AUTO: 0.4 % — SIGNIFICANT CHANGE UP (ref 0–0.9)
LYMPHOCYTES # BLD AUTO: 1.34 K/UL — SIGNIFICANT CHANGE UP (ref 1–3.3)
LYMPHOCYTES NFR BLD AUTO: 17.3 % — SIGNIFICANT CHANGE UP (ref 13–44)
MCHC RBC-ENTMCNC: 28.1 PG — SIGNIFICANT CHANGE UP (ref 27–34)
MCHC RBC-ENTMCNC: 31.7 G/DL — LOW (ref 32–36)
MCV RBC AUTO: 88.7 FL — SIGNIFICANT CHANGE UP (ref 80–100)
MONOCYTES # BLD AUTO: 0.96 K/UL — HIGH (ref 0–0.9)
MONOCYTES NFR BLD AUTO: 12.4 % — SIGNIFICANT CHANGE UP (ref 2–14)
NEUTROPHILS # BLD AUTO: 5.18 K/UL — SIGNIFICANT CHANGE UP (ref 1.8–7.4)
NEUTROPHILS NFR BLD AUTO: 67 % — SIGNIFICANT CHANGE UP (ref 43–77)
NRBC # BLD AUTO: 0 K/UL — SIGNIFICANT CHANGE UP (ref 0–0)
NRBC # FLD: 0 K/UL — SIGNIFICANT CHANGE UP (ref 0–0)
NRBC BLD AUTO-RTO: 0 /100 WBCS — SIGNIFICANT CHANGE UP (ref 0–0)
PLATELET # BLD AUTO: 180 K/UL — SIGNIFICANT CHANGE UP (ref 150–400)
PMV BLD: 9.9 FL — SIGNIFICANT CHANGE UP (ref 7–13)
POTASSIUM SERPL-MCNC: 4 MMOL/L — SIGNIFICANT CHANGE UP (ref 3.5–5.3)
POTASSIUM SERPL-SCNC: 4 MMOL/L — SIGNIFICANT CHANGE UP (ref 3.5–5.3)
PROT SERPL-MCNC: 7.3 G/DL — SIGNIFICANT CHANGE UP (ref 6–8.3)
RBC # BLD: 4.59 M/UL — SIGNIFICANT CHANGE UP (ref 4.2–5.8)
RBC # FLD: 13.9 % — SIGNIFICANT CHANGE UP (ref 10.3–14.5)
SODIUM SERPL-SCNC: 137 MMOL/L — SIGNIFICANT CHANGE UP (ref 135–145)
WBC # BLD: 7.74 K/UL — SIGNIFICANT CHANGE UP (ref 3.8–10.5)
WBC # FLD AUTO: 7.74 K/UL — SIGNIFICANT CHANGE UP (ref 3.8–10.5)

## 2025-07-06 PROCEDURE — 96374 THER/PROPH/DIAG INJ IV PUSH: CPT

## 2025-07-06 PROCEDURE — 80053 COMPREHEN METABOLIC PANEL: CPT

## 2025-07-06 PROCEDURE — 99284 EMERGENCY DEPT VISIT MOD MDM: CPT | Mod: 25

## 2025-07-06 PROCEDURE — 85025 COMPLETE CBC W/AUTO DIFF WBC: CPT

## 2025-07-06 RX ORDER — ACETAMINOPHEN 500 MG/5ML
1000 LIQUID (ML) ORAL ONCE
Refills: 0 | Status: COMPLETED | OUTPATIENT
Start: 2025-07-06 | End: 2025-07-06

## 2025-07-06 RX ADMIN — Medication 400 MILLIGRAM(S): at 01:05

## 2025-07-06 NOTE — ED PROVIDER NOTE - CLINICAL SUMMARY MEDICAL DECISION MAKING FREE TEXT BOX
69-year-old male past medical history of recent internal and external hemorrhoidectomy with Dr. Steele presents to the emergency department for constipation symptoms since procedure on July 3, 2025.  On physical examination there are external hemorrhoids on rectal exam there are nonthrombosed no fissures but deferred internal examination in setting of recent procedure.  Will get surgery consult for recs in setting of recent procedure. 69-year-old male past medical history of recent internal and external hemorrhoidectomy with Dr. Steele presents to the emergency department for constipation symptoms since procedure on July 3, 2025.  On physical examination there are external hemorrhoids on rectal exam there are nonthrombosed no fissures but deferred internal examination in setting of recent procedure.  Will get surgery consult for recs in setting of recent procedure.    DR STEELE'S INFORMATION IS PROVIDED BELOW.

## 2025-07-06 NOTE — ED PROVIDER NOTE - CARE PROVIDER_API CALL
Mahesh Steele  Surgery (General Surgery)  [address] [place] [address]  Phone: [phone]  Fax: [phone]  Follow Up Time:

## 2025-07-06 NOTE — ED PROVIDER NOTE - PROGRESS NOTE DETAILS
Jennifer PGY-2:   Spoke to surgery, they will come evaluate patient Jennifer PGY-2:   Still awaiting surgery to see patient. Jennifer PGY-2:  While waiting for evaluation patient had a very large bowel movement, he said he also urinated.  Was unable to give us a sample but states the pressure and abdominal pain he was having is all relieved and he had a very large bowel movement.  Spoke to surgery they say they can expedite follow-up with him with Dr. Steele, they will text Dr. Steele that patient is seen here in the ER.  Patient states he feels back at his baseline, no pain can follow-up outpatient with Dr. Steele.  Strict return precautions given will DC at this time.  Will forego UA, no concern for retention since he states he just urinated a very large amount.

## 2025-07-06 NOTE — ED PROVIDER NOTE - ATTENDING CONTRIBUTION TO CARE
ED Attending - Misbah Morrison MD:    MDM: 69-year-old male with history of hypertension, BPH, left kidney cancer s/p nephrectomy, and recent hemorrhoidectomy performed on 7/3/2025 with Dr. Mahesh Steele, who presents with constipation.  Patient has not been able to have a bowel movement since his procedure, but is still passing gas.  Patient also has difficulty with urination, but has been able to void.    ROS: denies trauma, fevers, chills, chest pain, shortness of breath, flank pain, back pain, abdominal pain, nausea, vomiting, diarrhea, obstipation, dysuria, hematuria.     On examination, patient with stable vitals, well-appearing, in no acute distress. Cardiac examination RRR, lungs CTAB.  ABD: Abdomen soft, non-tender and non-distended, no rebound, no guarding, no rigidity. No CVA tenderness.  Neurovascularly intact in all 4 extremities. Chaperoned Rectal Exam performed by resident, see above.    Will obtain labs to evaluate for hematologic disorder, metabolic derangements, hepatic and renal function.  Pain control, surgery consultation and reassess.    Labs reviewed: CBC showed no leukocytosis, mild anemia. Electrolytes non-actionable.  Creatinine similar to prior ED Attending - Misbah Morrison MD:    MDM: 69-year-old male with history of hypertension, BPH, left kidney cancer s/p nephrectomy, and recent hemorrhoidectomy performed on 7/3/2025 with Dr. Mahesh Steele, who presents with constipation.  Patient has not been able to have a bowel movement since his procedure, but is still passing gas.  Patient also has difficulty with urination, but has been able to void.    ROS: denies trauma, fevers, chills, chest pain, shortness of breath, flank pain, back pain, abdominal pain, nausea, vomiting, diarrhea, obstipation, dysuria, hematuria.     On examination, patient with stable vitals, well-appearing, in no acute distress. Cardiac examination RRR, lungs CTAB.  ABD: Abdomen soft, non-tender and non-distended, no rebound, no guarding, no rigidity. No CVA tenderness.  Neurovascularly intact in all 4 extremities. Chaperoned Rectal Exam performed by resident, see above, no evidence of fecal impaction or active bleeding.     Will obtain labs to evaluate for hematologic disorder, metabolic derangements, hepatic and renal function.  Pain control, surgery consultation and reassess.    Labs reviewed: CBC showed no leukocytosis, mild anemia. Electrolytes non-actionable.  Creatinine similar to prior    Surgery consulted, but prior to them seeing the patient, he had a large BM in the ED and had resolution of symptoms. Patient also urinated fully, and states he no longer feels difficulty w/ urinating. Patient states he feels no symptoms now and requests to go home. Surgery updated and they will message Dr. Steele about the patient's presentation to the ED.     At 5:50 AM, patient reports that their symptoms have improved. Repeat abdominal examination shows no tenderness, no peritoneal signs including rebound or guarding. Patient able to tolerate PO and void freely.  Stable for discharge with close follow-up and strict return precautions. Discussed the indications and side-effects of applicable medications.  Informed patient of all diagnostic test results including labs. The patient has been informed of all concerning signs and symptoms to return to Emergency Department, the necessity to follow up with PMD and surgeon within 2-3 days was explained, or to return to the ED if unable to follow-up appropriately, and the patient reports understanding of above with capacity and insight.

## 2025-07-06 NOTE — ED PROVIDER NOTE - NSFOLLOWUPINSTRUCTIONS_ED_ALL_ED_FT
You were seen in the emergency room today for constipation.    While you are waiting for evaluation you had a very large bowel movement and afterwards states you felt much better.    Your lab work looks unremarkable, you can follow-up outpatient with Dr. Steele.    Call his office to make an appointment to be seen tomorrow.    Return back to the emergency department for any severe abdominal pain, fevers, nausea, vomiting, unable to eat, loss of consciousness or any other concerning symptoms.

## 2025-07-06 NOTE — ED PROVIDER NOTE - PATIENT PORTAL LINK FT
You can access the FollowMyHealth Patient Portal offered by [facility] by registering at the following website: [email or web address]. By joining [employer]’s FollowMyHealth portal, you will also be able to view your health information using other applications (apps) compatible with our system.

## 2025-07-06 NOTE — ED PROVIDER NOTE - OBJECTIVE STATEMENT
69-year-old male past medical history of recent internal and external hemorrhoidectomy with Dr. Steele presents to the emergency department for constipation symptoms since procedure on July 3, 2025.  Patient states since procedure he has been having very tough time having a bowel movement, still passing gas.  Denies any significant abdominal pain, nausea, vomiting, decreased p.o. intake.  Denies any blood noticed in stool.  Reports difficulty urinating/incomplete voiding.

## 2025-07-06 NOTE — ED PROVIDER NOTE - PHYSICAL EXAMINATION
Physical Exam:  Gen:  Well appearing, awake, alert, non-toxic appearing  Head: NCAT  HEENT: Normal conjunctiva, trachea midline, moist mucous membranes  Lung: CTAB, no respiratory distress, no wheezes/rhonchi/rales B/L, speaking in full sentences  CV: RRR, no murmurs, rubs or gallops  Abd: Soft, non-tender, non-distended, no guarding, rigidity, rebound tenderness, or CVA tenderness   MSK: No visible deformities, moves all four extremities  RECTAL: Chaperoned by Dr. Anish Lucas seen, non thrombosed, no fissures or other external findings, did not perform internal examination in setting of recent procedure  Neuro: No focal motor deficits  Skin: Warm, well perfused, no visible rashes, no leg swelling  Psych: appropriate affect and mood

## 2025-07-07 LAB — SURGICAL PATHOLOGY STUDY: SIGNIFICANT CHANGE UP

## 2025-07-10 DIAGNOSIS — K64.8 OTHER HEMORRHOIDS: ICD-10-CM

## 2025-07-10 DIAGNOSIS — Z90.5 ACQUIRED ABSENCE OF KIDNEY: ICD-10-CM

## 2025-07-10 DIAGNOSIS — Z85.528 PERSONAL HISTORY OF OTHER MALIGNANT NEOPLASM OF KIDNEY: ICD-10-CM

## 2025-07-10 DIAGNOSIS — I10 ESSENTIAL (PRIMARY) HYPERTENSION: ICD-10-CM

## 2025-07-10 DIAGNOSIS — N40.0 BENIGN PROSTATIC HYPERPLASIA WITHOUT LOWER URINARY TRACT SYMPTOMS: ICD-10-CM

## 2025-08-25 ENCOUNTER — EMERGENCY (EMERGENCY)
Facility: HOSPITAL | Age: 70
LOS: 1 days | End: 2025-08-25
Attending: EMERGENCY MEDICINE
Payer: MEDICARE

## 2025-08-25 VITALS
WEIGHT: 145.06 LBS | DIASTOLIC BLOOD PRESSURE: 76 MMHG | HEIGHT: 69 IN | OXYGEN SATURATION: 97 % | HEART RATE: 88 BPM | TEMPERATURE: 98 F | SYSTOLIC BLOOD PRESSURE: 158 MMHG | RESPIRATION RATE: 20 BRPM

## 2025-08-25 DIAGNOSIS — Z90.5 ACQUIRED ABSENCE OF KIDNEY: Chronic | ICD-10-CM

## 2025-08-25 DIAGNOSIS — Z98.89 OTHER SPECIFIED POSTPROCEDURAL STATES: Chronic | ICD-10-CM

## 2025-08-25 PROCEDURE — 71045 X-RAY EXAM CHEST 1 VIEW: CPT

## 2025-08-25 PROCEDURE — 71045 X-RAY EXAM CHEST 1 VIEW: CPT | Mod: 26

## 2025-08-25 PROCEDURE — 99284 EMERGENCY DEPT VISIT MOD MDM: CPT

## 2025-08-25 PROCEDURE — 99283 EMERGENCY DEPT VISIT LOW MDM: CPT | Mod: 25

## 2025-08-25 RX ORDER — DEXTROMETHORPHAN HBR, GUAIFENESIN 200 MG/10ML
200 LIQUID ORAL ONCE
Refills: 0 | Status: COMPLETED | OUTPATIENT
Start: 2025-08-25 | End: 2025-08-25

## 2025-08-25 RX ADMIN — DEXTROMETHORPHAN HBR, GUAIFENESIN 200 MILLIGRAM(S): 200 LIQUID ORAL at 12:19

## (undated) DEVICE — SOL INJ NS 0.9% 500ML 2 PORT

## (undated) DEVICE — INJ SYS RAP REFIL

## (undated) DEVICE — FRA-ESU BOVIE FORCE TRIAD T6D04558DX: Type: DURABLE MEDICAL EQUIPMENT

## (undated) DEVICE — BRUSH CYTO RAP EXCHG 3MM

## (undated) DEVICE — LITHOTRIPY BASKET TRAPEZOID 2.5CM

## (undated) DEVICE — SUCTION YANKAUER NO CONTROL VENT

## (undated) DEVICE — BRUSH COLONOSCOPY CYTOLOGY

## (undated) DEVICE — NDL INJ SCLERO INTERJECT 23G

## (undated) DEVICE — CATH IV SAFE BC 22G X 1" (BLUE)

## (undated) DEVICE — CATH IV SAFE BC 20G X 1.16" (PINK)

## (undated) DEVICE — FOLEY HOLDER STATLOCK 2 WAY ADULT

## (undated) DEVICE — BITE BLOCK ADULT 20 X 27MM (GREEN)

## (undated) DEVICE — BIOPSY FORCEP RADIAL JAW 4 STANDARD WITH NEEDLE

## (undated) DEVICE — TUBING SUCTION 20FT

## (undated) DEVICE — PACK IV START WITH CHG

## (undated) DEVICE — LOK DVC RX AND BIOPSY

## (undated) DEVICE — DRAPE TOWEL BLUE 17" X 24"

## (undated) DEVICE — MICROKNIFE XL

## (undated) DEVICE — PAPIL BILRTH II 6-5FRX0.035IN

## (undated) DEVICE — SUT VICRYL 4-0 27" RB-1 UNDYED

## (undated) DEVICE — WARMING BLANKET UPPER ADULT

## (undated) DEVICE — SNARE POLYP SENS SM 13MM 240CM

## (undated) DEVICE — DRAPE 3/4 SHEET W REINFORCEMENT 56X77"

## (undated) DEVICE — NDL INJ SCLERO INTERJECT 25G

## (undated) DEVICE — LIGASURE SMALL JAW

## (undated) DEVICE — PACK MINOR WITH LAP

## (undated) DEVICE — SENSOR O2 FINGER ADULT

## (undated) DEVICE — SPHINCTEROTOME CLEVERCUT WIRE 25MM  2.8MM X 170CM

## (undated) DEVICE — NDL ENDO ASPIRATION SLIMLINE HDL 25G

## (undated) DEVICE — POSITIONER FOAM HEAD CRADLE (PINK)

## (undated) DEVICE — INFINITY SAMPLING DEVICE ERCP 9FRX200CM

## (undated) DEVICE — GLV 7.5 PROTEXIS (WHITE)

## (undated) DEVICE — SNARE POLYP MINI ACCUSNR 1.5 X 3CM

## (undated) DEVICE — ELCTR GROUNDING PAD ADULT COVIDIEN

## (undated) DEVICE — TUBING TRUWAVE PRESSURE MALE/FEMALE 72"

## (undated) DEVICE — TUBING IV SET GRAVITY 3Y 100" MACRO

## (undated) DEVICE — VENODYNE/SCD SLEEVE CALF MEDIUM